# Patient Record
Sex: MALE | Race: WHITE | NOT HISPANIC OR LATINO | Employment: UNEMPLOYED | URBAN - METROPOLITAN AREA
[De-identification: names, ages, dates, MRNs, and addresses within clinical notes are randomized per-mention and may not be internally consistent; named-entity substitution may affect disease eponyms.]

---

## 2017-01-10 ENCOUNTER — GENERIC CONVERSION - ENCOUNTER (OUTPATIENT)
Dept: OTHER | Facility: OTHER | Age: 52
End: 2017-01-10

## 2017-07-07 ENCOUNTER — LAB CONVERSION - ENCOUNTER (OUTPATIENT)
Dept: OTHER | Facility: OTHER | Age: 52
End: 2017-07-07

## 2017-07-07 LAB
25(OH)D3 SERPL-MCNC: 36 NG/ML (ref 30–100)
A/G RATIO (HISTORICAL): 1.7 (CALC) (ref 1–2.5)
ALBUMIN SERPL BCP-MCNC: 4.6 G/DL (ref 3.6–5.1)
ALP SERPL-CCNC: 71 U/L (ref 40–115)
ALT SERPL W P-5'-P-CCNC: 14 U/L (ref 9–46)
AST SERPL W P-5'-P-CCNC: 23 U/L (ref 10–35)
BASOPHILS # BLD AUTO: 0.3 %
BASOPHILS # BLD AUTO: 16 CELLS/UL (ref 0–200)
BILIRUB SERPL-MCNC: 1.2 MG/DL (ref 0.2–1.2)
BUN SERPL-MCNC: 12 MG/DL (ref 7–25)
BUN/CREA RATIO (HISTORICAL): ABNORMAL (CALC) (ref 6–22)
CALCIUM (ADJUSTED FOR ALBUMIN) (HISTORICAL): 10 MG/DL (CALC) (ref 8.6–10.2)
CALCIUM SERPL-MCNC: 10.1 MG/DL (ref 8.6–10.3)
CHLORIDE SERPL-SCNC: 101 MMOL/L (ref 98–110)
CHOLEST SERPL-MCNC: 263 MG/DL (ref 125–200)
CHOLEST/HDLC SERPL: 2.9 (CALC)
CO2 SERPL-SCNC: 30 MMOL/L (ref 20–31)
CREAT SERPL-MCNC: 0.86 MG/DL (ref 0.7–1.33)
DEPRECATED RDW RBC AUTO: 13.2 % (ref 11–15)
EGFR AFRICAN AMERICAN (HISTORICAL): 114 ML/MIN/1.73M2
EGFR-AMERICAN CALC (HISTORICAL): 98 ML/MIN/1.73M2
EOSINOPHIL # BLD AUTO: 1.3 %
EOSINOPHIL # BLD AUTO: 68 CELLS/UL (ref 15–500)
GAMMA GLOBULIN (HISTORICAL): 2.7 G/DL (CALC) (ref 1.9–3.7)
GLUCOSE (HISTORICAL): 115 MG/DL (ref 65–99)
HCT VFR BLD AUTO: 43.5 % (ref 38.5–50)
HDLC SERPL-MCNC: 91 MG/DL
HGB BLD-MCNC: 14.8 G/DL (ref 13.2–17.1)
HIV AG/AB, 4TH GEN (HISTORICAL): NORMAL
LDL CHOLESTEROL (HISTORICAL): 144 MG/DL (CALC)
LYMPHOCYTES # BLD AUTO: 1539 CELLS/UL (ref 850–3900)
LYMPHOCYTES # BLD AUTO: 29.6 %
MCH RBC QN AUTO: 33.2 PG (ref 27–33)
MCHC RBC AUTO-ENTMCNC: 34 G/DL (ref 32–36)
MCV RBC AUTO: 97.5 FL (ref 80–100)
MONOCYTES # BLD AUTO: 432 CELLS/UL (ref 200–950)
MONOCYTES (HISTORICAL): 8.3 %
NEUTROPHILS # BLD AUTO: 3146 CELLS/UL (ref 1500–7800)
NEUTROPHILS # BLD AUTO: 60.5 %
NON-HDL-CHOL (CHOL-HDL) (HISTORICAL): 172 MG/DL (CALC)
PLATELET # BLD AUTO: 225 THOUSAND/UL (ref 140–400)
PMV BLD AUTO: 8.9 FL (ref 7.5–12.5)
POTASSIUM SERPL-SCNC: 4.8 MMOL/L (ref 3.5–5.3)
RBC # BLD AUTO: 4.46 MILLION/UL (ref 4.2–5.8)
SODIUM SERPL-SCNC: 141 MMOL/L (ref 135–146)
T4 FREE SERPL-MCNC: 1.9 NG/DL (ref 0.8–1.8)
TOTAL PROTEIN (HISTORICAL): 7.3 G/DL (ref 6.1–8.1)
TRIGL SERPL-MCNC: 138 MG/DL
TSH SERPL DL<=0.05 MIU/L-ACNC: 0.15 MIU/L (ref 0.4–4.5)
WBC # BLD AUTO: 5.2 THOUSAND/UL (ref 3.8–10.8)

## 2017-07-10 ENCOUNTER — ALLSCRIPTS OFFICE VISIT (OUTPATIENT)
Dept: OTHER | Facility: OTHER | Age: 52
End: 2017-07-10

## 2018-01-11 NOTE — MISCELLANEOUS
Message  Pt accompanied His partner today for his medical visit  After evaluating his Partner, patient wanted to voice complaints as well without appointment  He stated that he does travel frequently to [facility] plane  He was having increasing anxiety/panic attacks while on his flights  This patient's complaints were evaluated  At this time, he was given a prescription for alprazolam to take PRN before his flight  Address this complain much further in depth at his next check up  Plan  Cervical radiculopathy    · OxyCODONE HCl - 10 MG Oral Tablet; Take 1 tablet every 6 hours as needed for  pain  Fear of flying    · ALPRAZolam 0 5 MG Oral Tablet; Take 1 tablet 30 min prior to flight as needed    Signatures   Electronically signed by :  Shasta Wells DO; Sep 26 2016 10:17PM EST                       (Author)

## 2018-01-12 VITALS
HEIGHT: 70 IN | SYSTOLIC BLOOD PRESSURE: 140 MMHG | TEMPERATURE: 97.7 F | WEIGHT: 200.31 LBS | HEART RATE: 68 BPM | OXYGEN SATURATION: 98 % | BODY MASS INDEX: 28.68 KG/M2 | DIASTOLIC BLOOD PRESSURE: 84 MMHG

## 2018-03-13 DIAGNOSIS — M54.12 CERVICAL RADICULOPATHY: ICD-10-CM

## 2018-03-13 DIAGNOSIS — F40.243 FEAR OF FLYING: Primary | ICD-10-CM

## 2018-03-13 RX ORDER — OXYCODONE HYDROCHLORIDE 10 MG/1
1 TABLET ORAL EVERY 8 HOURS PRN
COMMUNITY
Start: 2015-09-28 | End: 2018-03-13 | Stop reason: SDUPTHER

## 2018-03-13 RX ORDER — ALPRAZOLAM 0.5 MG/1
0.5 TABLET ORAL
COMMUNITY
Start: 2016-09-26 | End: 2018-03-13 | Stop reason: SDUPTHER

## 2018-03-14 PROBLEM — R94.6 ABNORMAL THYROID FUNCTION TEST: Status: ACTIVE | Noted: 2017-07-10

## 2018-03-14 RX ORDER — ALPRAZOLAM 0.5 MG/1
0.5 TABLET ORAL DAILY PRN
Qty: 30 TABLET | Refills: 0 | Status: SHIPPED | OUTPATIENT
Start: 2018-03-14 | End: 2018-03-20 | Stop reason: SDUPTHER

## 2018-03-14 RX ORDER — OXYCODONE HYDROCHLORIDE 10 MG/1
10 TABLET ORAL EVERY 8 HOURS PRN
Qty: 60 TABLET | Refills: 0 | Status: SHIPPED | OUTPATIENT
Start: 2018-03-14 | End: 2018-03-20 | Stop reason: SDUPTHER

## 2018-03-20 DIAGNOSIS — E03.9 HYPOTHYROIDISM, UNSPECIFIED TYPE: Primary | ICD-10-CM

## 2018-03-20 DIAGNOSIS — F40.243 FEAR OF FLYING: ICD-10-CM

## 2018-03-20 DIAGNOSIS — M54.12 CERVICAL RADICULOPATHY: ICD-10-CM

## 2018-03-20 RX ORDER — LEVOTHYROXINE SODIUM 0.15 MG/1
1 TABLET ORAL DAILY
COMMUNITY
Start: 2011-07-19 | End: 2018-03-20 | Stop reason: SDUPTHER

## 2018-03-21 RX ORDER — ALPRAZOLAM 0.5 MG/1
0.5 TABLET ORAL DAILY PRN
Qty: 30 TABLET | Refills: 0 | Status: SHIPPED | OUTPATIENT
Start: 2018-03-21 | End: 2018-03-22 | Stop reason: SDUPTHER

## 2018-03-21 RX ORDER — OXYCODONE HYDROCHLORIDE 10 MG/1
10 TABLET ORAL EVERY 8 HOURS PRN
Qty: 60 TABLET | Refills: 0 | Status: SHIPPED | OUTPATIENT
Start: 2018-03-21 | End: 2018-03-22 | Stop reason: SDUPTHER

## 2018-03-21 RX ORDER — LEVOTHYROXINE SODIUM 0.15 MG/1
150 TABLET ORAL DAILY
Qty: 90 TABLET | Refills: 0 | Status: SHIPPED | OUTPATIENT
Start: 2018-03-21 | End: 2018-03-22 | Stop reason: SDUPTHER

## 2018-03-22 DIAGNOSIS — M54.12 CERVICAL RADICULOPATHY: ICD-10-CM

## 2018-03-22 DIAGNOSIS — F40.243 FEAR OF FLYING: ICD-10-CM

## 2018-03-22 DIAGNOSIS — E03.9 HYPOTHYROIDISM, UNSPECIFIED TYPE: ICD-10-CM

## 2018-03-22 RX ORDER — LEVOTHYROXINE SODIUM 0.15 MG/1
150 TABLET ORAL DAILY
Qty: 90 TABLET | Refills: 1 | Status: SHIPPED | OUTPATIENT
Start: 2018-03-22 | End: 2018-06-26 | Stop reason: SDUPTHER

## 2018-03-22 RX ORDER — OXYCODONE HYDROCHLORIDE 10 MG/1
10 TABLET ORAL EVERY 8 HOURS PRN
Qty: 60 TABLET | Refills: 0 | Status: SHIPPED | OUTPATIENT
Start: 2018-03-22 | End: 2018-05-07 | Stop reason: SDUPTHER

## 2018-03-22 RX ORDER — ALPRAZOLAM 0.5 MG/1
0.5 TABLET ORAL DAILY PRN
Qty: 30 TABLET | Refills: 0 | Status: SHIPPED | OUTPATIENT
Start: 2018-03-22 | End: 2018-05-07 | Stop reason: SDUPTHER

## 2018-03-29 DIAGNOSIS — Z13.0 SCREENING FOR IRON DEFICIENCY ANEMIA: ICD-10-CM

## 2018-03-29 DIAGNOSIS — Z72.51 HIGH RISK SEXUAL BEHAVIOR: ICD-10-CM

## 2018-03-29 DIAGNOSIS — E55.9 VITAMIN D DEFICIENCY: ICD-10-CM

## 2018-03-29 DIAGNOSIS — E78.2 MIXED HYPERLIPIDEMIA: ICD-10-CM

## 2018-03-29 DIAGNOSIS — Z13.1 SCREENING FOR DIABETES MELLITUS: ICD-10-CM

## 2018-03-29 DIAGNOSIS — E03.9 HYPOTHYROIDISM, UNSPECIFIED TYPE: Primary | ICD-10-CM

## 2018-04-01 ENCOUNTER — APPOINTMENT (EMERGENCY)
Dept: CT IMAGING | Facility: HOSPITAL | Age: 53
End: 2018-04-01
Payer: COMMERCIAL

## 2018-04-01 ENCOUNTER — HOSPITAL ENCOUNTER (EMERGENCY)
Facility: HOSPITAL | Age: 53
Discharge: HOME | End: 2018-04-01
Attending: EMERGENCY MEDICINE | Admitting: EMERGENCY MEDICINE
Payer: COMMERCIAL

## 2018-04-01 VITALS
RESPIRATION RATE: 18 BRPM | SYSTOLIC BLOOD PRESSURE: 154 MMHG | TEMPERATURE: 98.3 F | BODY MASS INDEX: 28.63 KG/M2 | HEART RATE: 66 BPM | WEIGHT: 200 LBS | HEIGHT: 70 IN | DIASTOLIC BLOOD PRESSURE: 81 MMHG | OXYGEN SATURATION: 100 %

## 2018-04-01 DIAGNOSIS — K29.70 GASTRITIS: Primary | ICD-10-CM

## 2018-04-01 DIAGNOSIS — R11.10 VOMITING: ICD-10-CM

## 2018-04-01 DIAGNOSIS — R10.13 EPIGASTRIC PAIN: ICD-10-CM

## 2018-04-01 LAB
ALBUMIN SERPL BCP-MCNC: 4.8 G/DL (ref 3.5–5)
ALP SERPL-CCNC: 75 U/L (ref 46–116)
ALT SERPL W P-5'-P-CCNC: 31 U/L (ref 12–78)
ANION GAP SERPL CALCULATED.3IONS-SCNC: 17 MMOL/L (ref 4–13)
AST SERPL W P-5'-P-CCNC: 28 U/L (ref 5–45)
BASOPHILS # BLD MANUAL: 0 THOUSAND/UL (ref 0–0.1)
BASOPHILS NFR MAR MANUAL: 0 % (ref 0–1)
BILIRUB SERPL-MCNC: 1.4 MG/DL (ref 0.2–1)
BUN SERPL-MCNC: 13 MG/DL (ref 5–25)
CALCIUM SERPL-MCNC: 10.6 MG/DL (ref 8.3–10.1)
CHLORIDE SERPL-SCNC: 100 MMOL/L (ref 100–108)
CO2 SERPL-SCNC: 23 MMOL/L (ref 21–32)
CREAT SERPL-MCNC: 0.89 MG/DL (ref 0.6–1.3)
EOSINOPHIL # BLD MANUAL: 0 THOUSAND/UL (ref 0–0.4)
EOSINOPHIL NFR BLD MANUAL: 0 % (ref 0–6)
ERYTHROCYTE [DISTWIDTH] IN BLOOD BY AUTOMATED COUNT: 12.2 % (ref 11.6–15.1)
GFR SERPL CREATININE-BSD FRML MDRD: 97 ML/MIN/1.73SQ M
GLUCOSE SERPL-MCNC: 174 MG/DL (ref 65–140)
HCT VFR BLD AUTO: 42.8 % (ref 36.5–49.3)
HGB BLD-MCNC: 15.6 G/DL (ref 12–17)
LIPASE SERPL-CCNC: 90 U/L (ref 73–393)
LYMPHOCYTES # BLD AUTO: 0.43 THOUSAND/UL (ref 0.6–4.47)
LYMPHOCYTES # BLD AUTO: 7 % (ref 14–44)
MCH RBC QN AUTO: 34 PG (ref 26.8–34.3)
MCHC RBC AUTO-ENTMCNC: 36.4 G/DL (ref 31.4–37.4)
MCV RBC AUTO: 93 FL (ref 82–98)
MONOCYTES # BLD AUTO: 0.19 THOUSAND/UL (ref 0–1.22)
MONOCYTES NFR BLD: 3 % (ref 4–12)
NEUTROPHILS # BLD MANUAL: 5.56 THOUSAND/UL (ref 1.85–7.62)
NEUTS BAND NFR BLD MANUAL: 18 % (ref 0–8)
NEUTS SEG NFR BLD AUTO: 72 % (ref 43–75)
NRBC BLD AUTO-RTO: 0 /100 WBCS
PLATELET # BLD AUTO: 213 THOUSANDS/UL (ref 149–390)
PLATELET BLD QL SMEAR: ADEQUATE
PMV BLD AUTO: 11.3 FL (ref 8.9–12.7)
POTASSIUM SERPL-SCNC: 3.4 MMOL/L (ref 3.5–5.3)
PROT SERPL-MCNC: 8.6 G/DL (ref 6.4–8.2)
RBC # BLD AUTO: 4.59 MILLION/UL (ref 3.88–5.62)
RBC MORPH BLD: NORMAL
SODIUM SERPL-SCNC: 140 MMOL/L (ref 136–145)
TOTAL CELLS COUNTED SPEC: 100
WBC # BLD AUTO: 6.18 THOUSAND/UL (ref 4.31–10.16)

## 2018-04-01 PROCEDURE — 36415 COLL VENOUS BLD VENIPUNCTURE: CPT | Performed by: EMERGENCY MEDICINE

## 2018-04-01 PROCEDURE — 93005 ELECTROCARDIOGRAM TRACING: CPT

## 2018-04-01 PROCEDURE — C9113 INJ PANTOPRAZOLE SODIUM, VIA: HCPCS | Performed by: EMERGENCY MEDICINE

## 2018-04-01 PROCEDURE — 85027 COMPLETE CBC AUTOMATED: CPT | Performed by: EMERGENCY MEDICINE

## 2018-04-01 PROCEDURE — 80053 COMPREHEN METABOLIC PANEL: CPT | Performed by: EMERGENCY MEDICINE

## 2018-04-01 PROCEDURE — 74177 CT ABD & PELVIS W/CONTRAST: CPT

## 2018-04-01 PROCEDURE — 96374 THER/PROPH/DIAG INJ IV PUSH: CPT

## 2018-04-01 PROCEDURE — 96375 TX/PRO/DX INJ NEW DRUG ADDON: CPT

## 2018-04-01 PROCEDURE — 96361 HYDRATE IV INFUSION ADD-ON: CPT

## 2018-04-01 PROCEDURE — 85007 BL SMEAR W/DIFF WBC COUNT: CPT | Performed by: EMERGENCY MEDICINE

## 2018-04-01 PROCEDURE — 83690 ASSAY OF LIPASE: CPT | Performed by: EMERGENCY MEDICINE

## 2018-04-01 PROCEDURE — 99284 EMERGENCY DEPT VISIT MOD MDM: CPT

## 2018-04-01 RX ORDER — DICYCLOMINE HCL 20 MG
20 TABLET ORAL 2 TIMES DAILY
Qty: 10 TABLET | Refills: 0 | Status: SHIPPED | OUTPATIENT
Start: 2018-04-01 | End: 2018-06-03

## 2018-04-01 RX ORDER — PANTOPRAZOLE SODIUM 40 MG/1
40 INJECTION, POWDER, FOR SOLUTION INTRAVENOUS ONCE
Status: COMPLETED | OUTPATIENT
Start: 2018-04-01 | End: 2018-04-01

## 2018-04-01 RX ORDER — METOCLOPRAMIDE HYDROCHLORIDE 5 MG/ML
10 INJECTION INTRAMUSCULAR; INTRAVENOUS ONCE
Status: COMPLETED | OUTPATIENT
Start: 2018-04-01 | End: 2018-04-01

## 2018-04-01 RX ORDER — ONDANSETRON 2 MG/ML
4 INJECTION INTRAMUSCULAR; INTRAVENOUS ONCE
Status: COMPLETED | OUTPATIENT
Start: 2018-04-01 | End: 2018-04-01

## 2018-04-01 RX ORDER — PANTOPRAZOLE SODIUM 40 MG/1
40 TABLET, DELAYED RELEASE ORAL DAILY
Qty: 15 TABLET | Refills: 0 | Status: SHIPPED | OUTPATIENT
Start: 2018-04-01 | End: 2018-06-03

## 2018-04-01 RX ORDER — ONDANSETRON 4 MG/1
4 TABLET, ORALLY DISINTEGRATING ORAL EVERY 8 HOURS PRN
Qty: 15 TABLET | Refills: 0 | Status: SHIPPED | OUTPATIENT
Start: 2018-04-01 | End: 2018-06-03

## 2018-04-01 RX ORDER — MORPHINE SULFATE 4 MG/ML
4 INJECTION, SOLUTION INTRAMUSCULAR; INTRAVENOUS ONCE
Status: COMPLETED | OUTPATIENT
Start: 2018-04-01 | End: 2018-04-01

## 2018-04-01 RX ORDER — MAGNESIUM HYDROXIDE/ALUMINUM HYDROXICE/SIMETHICONE 120; 1200; 1200 MG/30ML; MG/30ML; MG/30ML
30 SUSPENSION ORAL ONCE
Status: COMPLETED | OUTPATIENT
Start: 2018-04-01 | End: 2018-04-01

## 2018-04-01 RX ORDER — FENTANYL CITRATE 50 UG/ML
50 INJECTION, SOLUTION INTRAMUSCULAR; INTRAVENOUS ONCE
Status: DISCONTINUED | OUTPATIENT
Start: 2018-04-01 | End: 2018-04-02 | Stop reason: HOSPADM

## 2018-04-01 RX ADMIN — ALUMINUM HYDROXIDE, MAGNESIUM HYDROXIDE, AND SIMETHICONE 30 ML: 200; 200; 20 SUSPENSION ORAL at 19:11

## 2018-04-01 RX ADMIN — SODIUM CHLORIDE 1000 ML: 0.9 INJECTION, SOLUTION INTRAVENOUS at 18:45

## 2018-04-01 RX ADMIN — LIDOCAINE HYDROCHLORIDE 15 ML: 20 SOLUTION ORAL; TOPICAL at 19:12

## 2018-04-01 RX ADMIN — MORPHINE SULFATE 4 MG: 4 INJECTION INTRAVENOUS at 18:41

## 2018-04-01 RX ADMIN — IOHEXOL 100 ML: 350 INJECTION, SOLUTION INTRAVENOUS at 20:58

## 2018-04-01 RX ADMIN — ONDANSETRON 4 MG: 2 INJECTION INTRAMUSCULAR; INTRAVENOUS at 18:41

## 2018-04-01 RX ADMIN — PANTOPRAZOLE SODIUM 40 MG: 40 INJECTION, POWDER, FOR SOLUTION INTRAVENOUS at 18:44

## 2018-04-01 RX ADMIN — METOCLOPRAMIDE 10 MG: 5 INJECTION, SOLUTION INTRAMUSCULAR; INTRAVENOUS at 20:29

## 2018-04-01 NOTE — ED PROVIDER NOTES
History  Chief Complaint   Patient presents with    Vomiting     vomiting since 10am, "like 25 times" abdominal pain  denies diarrhea and fever  History provided by:  Patient   used: No    Vomiting   Severity:  Moderate  Duration:  12 hours  Timing:  Intermittent  Number of daily episodes:  Unable to specify  Quality:  Stomach contents  Able to tolerate:  Liquids  Progression:  Unchanged  Chronicity:  New  Recent urination:  Normal  Relieved by:  Nothing  Worsened by:  Nothing  Ineffective treatments:  None tried  Associated symptoms: abdominal pain    Associated symptoms: no chills, no cough, no diarrhea, no fever, no headaches and no sore throat    Abdominal pain:     Location:  Epigastric    Quality: cramping      Severity:  Moderate    Onset quality:  Gradual    Duration:  12 hours    Timing:  Intermittent    Progression:  Waxing and waning    Chronicity:  New  Risk factors: no prior abdominal surgery        Prior to Admission Medications   Prescriptions Last Dose Informant Patient Reported? Taking? ALPRAZolam (XANAX) 0 5 mg tablet   No No   Sig: Take 1 tablet (0 5 mg total) by mouth daily as needed for anxiety   levothyroxine 150 mcg tablet   No No   Sig: Take 1 tablet (150 mcg total) by mouth daily   oxyCODONE (ROXICODONE) 10 MG TABS   No No   Sig: Take 1 tablet (10 mg total) by mouth every 8 (eight) hours as needed for severe pain Earliest Fill Date: 3/22/18 Max Daily Amount: 30 mg      Facility-Administered Medications: None       Past Medical History:   Diagnosis Date    Abnormal glucose     last assessed-12/5/2013    Acute infective pharyngitis     last assessed-5/19/2013    Elevated blood pressure reading without diagnosis of hypertension     last assessed-12/5/2013    Enlarged prostate     last assessed-12/2/2013    Exposure to STD     last assessed-3/30/2016    Injury of blood vessel at forearm level     last assessed-3/30/2013       History reviewed   No pertinent surgical history  Family History   Problem Relation Age of Onset    No Known Problems Mother     No Known Problems Father      I have reviewed and agree with the history as documented  Social History   Substance Use Topics    Smoking status: Never Smoker    Smokeless tobacco: Never Used    Alcohol use No        Review of Systems   Constitutional: Negative for activity change, chills and fever  HENT: Negative for facial swelling, sore throat and trouble swallowing  Eyes: Negative for pain and visual disturbance  Respiratory: Negative for cough, chest tightness and shortness of breath  Cardiovascular: Negative for chest pain and leg swelling  Gastrointestinal: Positive for abdominal pain and vomiting  Negative for blood in stool, diarrhea and nausea  Genitourinary: Negative for dysuria and flank pain  Musculoskeletal: Negative for back pain, neck pain and neck stiffness  Skin: Negative for pallor and rash  Allergic/Immunologic: Negative for environmental allergies and immunocompromised state  Neurological: Negative for dizziness and headaches  Hematological: Negative for adenopathy  Does not bruise/bleed easily  Psychiatric/Behavioral: Negative for agitation and behavioral problems  All other systems reviewed and are negative        Physical Exam  ED Triage Vitals   Temperature Pulse Respirations Blood Pressure SpO2   04/01/18 1804 04/01/18 1804 04/01/18 1804 04/01/18 1805 04/01/18 1804   98 3 °F (36 8 °C) 66 18 (!) 202/95 99 %      Temp Source Heart Rate Source Patient Position - Orthostatic VS BP Location FiO2 (%)   04/01/18 1804 04/01/18 1804 04/01/18 1805 04/01/18 1805 --   Oral Monitor Sitting Right arm       Pain Score       04/01/18 1804       6           Orthostatic Vital Signs  Vitals:    04/01/18 1804 04/01/18 1805 04/01/18 2000 04/01/18 2145   BP:  (!) 202/95 (!) 195/100 154/81   Pulse: 66  62 66   Patient Position - Orthostatic VS:  Sitting Sitting Sitting Physical Exam   Constitutional: He is oriented to person, place, and time  He appears well-developed and well-nourished  No distress  HENT:   Head: Normocephalic and atraumatic  Eyes: EOM are normal    Neck: Normal range of motion  Neck supple  Cardiovascular: Normal rate, regular rhythm, normal heart sounds and intact distal pulses  Pulmonary/Chest: Effort normal and breath sounds normal    Abdominal: Soft  Bowel sounds are normal  There is tenderness (epigastric)  There is no rebound and no guarding  Musculoskeletal: Normal range of motion  Neurological: He is alert and oriented to person, place, and time  Skin: Skin is warm and dry  Psychiatric: He has a normal mood and affect  Nursing note and vitals reviewed        ED Medications  Medications   fentanyl citrate (PF) 100 MCG/2ML 50 mcg (0 mcg Intravenous Hold 4/1/18 2045)   sodium chloride 0 9 % bolus 1,000 mL (0 mL Intravenous Stopped 4/1/18 2001)   ondansetron (ZOFRAN) injection 4 mg (4 mg Intravenous Given 4/1/18 1841)   pantoprazole (PROTONIX) injection 40 mg (40 mg Intravenous Given 4/1/18 1844)   morphine (PF) 4 mg/mL injection 4 mg (4 mg Intravenous Given 4/1/18 1841)   aluminum-magnesium hydroxide-simethicone (MYLANTA) 200-200-20 mg/5 mL oral suspension 30 mL (30 mL Oral Given 4/1/18 1911)   lidocaine viscous (XYLOCAINE) 2 % mucosal solution 15 mL (15 mL Swish & Swallow Given 4/1/18 1912)   metoclopramide (REGLAN) injection 10 mg (10 mg Intravenous Given 4/1/18 2029)   iohexol (OMNIPAQUE) 350 MG/ML injection (MULTI-DOSE) 100 mL (100 mL Intravenous Given 4/1/18 2058)       Diagnostic Studies  Results Reviewed     Procedure Component Value Units Date/Time    CBC and differential [[id number]]  (Normal) Collected:  04/01/18 1845    Lab Status:  Final result Specimen:  Blood from Arm, Right Updated:  04/01/18 1922     WBC 6 18 Thousand/uL      RBC 4 59 Million/uL      Hemoglobin 15 6 g/dL      Hematocrit 42 8 %      MCV 93 fL      MCH 34 0 pg      MCHC 36 4 g/dL      RDW 12 2 %      MPV 11 3 fL      Platelets 975 Thousands/uL      nRBC 0 /100 WBCs     Narrative: This is an appended report  These results have been appended to a previously verified report  Comprehensive metabolic panel [[id number]]  (Abnormal) Collected:  04/01/18 1845    Lab Status:  Final result Specimen:  Blood from Arm, Right Updated:  04/01/18 1913     Sodium 140 mmol/L      Potassium 3 4 (L) mmol/L      Chloride 100 mmol/L      CO2 23 mmol/L      Anion Gap 17 (H) mmol/L      BUN 13 mg/dL      Creatinine 0 89 mg/dL      Glucose 174 (H) mg/dL      Calcium 10 6 (H) mg/dL      AST 28 U/L      ALT 31 U/L      Alkaline Phosphatase 75 U/L      Total Protein 8 6 (H) g/dL      Albumin 4 8 g/dL      Total Bilirubin 1 40 (H) mg/dL      eGFR 97 ml/min/1 73sq m     Narrative:         National Kidney Disease Education Program recommendations are as follows:  GFR calculation is accurate only with a steady state creatinine  Chronic Kidney disease less than 60 ml/min/1 73 sq  meters  Kidney failure less than 15 ml/min/1 73 sq  meters  Lipase [[id number]]  (Normal) Collected:  04/01/18 1845    Lab Status:  Final result Specimen:  Blood from Arm, Right Updated:  04/01/18 1913     Lipase 90 u/L                  CT abdomen pelvis with contrast   Final Result by Geni Lyon MD (04/01 2226)   No acute inflammatory process in the abdomen or pelvis              Workstation performed: [id number]                    Procedures  ECG 12 Lead Documentation  Date/Time: 4/1/2018 8:26 PM  Performed by: Yvonne Packer  Authorized by: Yvonne Packer     Indications / Diagnosis:  Epigastric pain  ECG reviewed by me, the ED Provider: yes    Patient location:  ED  Interpretation:     Interpretation: normal    Rate:     ECG rate:  58    ECG rate assessment: bradycardic    Rhythm:     Rhythm: sinus bradycardia    Ectopy:     Ectopy: none    QRS:     QRS axis:  Normal  Conduction:     Conduction: normal ST segments:     ST segments:  Normal  T waves:     T waves: normal             Phone Contacts  ED Phone Contact    ED Course  ED Course as of Apr 01 2302   Sun Apr 01, 2018 2022 Labs reviewed, no acute abnormality  Patient re-evaluated at bedside, feels better but still nauseous, we will give Reglan, will do p o  Trial after  2150 Vitals improved, feels better, pain is less  CT results pending  Blood Pressure: 154/81   2230 CT A/P no acute abnormality  We will discharge as Gastritis, prescribe Protonix, Zofran, Bentyl; follow up with PCP  NOTE: CT was done as patient continued to be nauseas with hypertension and persisting epigastric pain (no chest pain), after zofran, ivf, protonix; required reglan  MDM  Number of Diagnoses or Management Options  Epigastric pain: new and requires workup  Gastritis: new and requires workup  Vomiting: new and requires workup  Diagnosis management comments: Patient is a 70-year-old male, comes in with complaints of epigastric pain, nausea, vomiting, started around 9-10 a m  in the morning, did not have dinner last night, had little breakfast but felt nauseous, denies fever, diarrhea, chest pain or dyspnea; denies sick contacts or recent travel  On exam vital signs noted for hypertension, bilateral equal pulses, abdomen soft, tenderness noted in epigastrium; lungs clear, cardiovascular normal heart sounds  D/D:  Gastritis, viral GI illness, cholecystitis, biliary colic  Will check labs including CBC, CMP, lipase, give IV fluids, Zofran, Protonix on pain meds         Amount and/or Complexity of Data Reviewed  Clinical lab tests: reviewed and ordered  Tests in the radiology section of CPT®: ordered and reviewed  Tests in the medicine section of CPT®: ordered and reviewed  Independent visualization of images, tracings, or specimens: yes      CritCare Time    Disposition  Final diagnoses:   Gastritis   Epigastric pain   Vomiting     Time reflects when diagnosis was documented in both MDM as applicable and the Disposition within this note     Time User Action Codes Description Comment    4/1/2018  9:54 PM Lowella Leader Add [K29 70] Gastritis     4/1/2018  9:54 PM Lowella Leader Add [R10 13] Epigastric pain     4/1/2018  9:55 PM Lowella Leader Add [R11 10] Vomiting       ED Disposition     ED Disposition Condition Comment    Discharge  [address] discharge to home/self care  Condition at discharge: Stable        Follow-up Information     Follow up With Specialties Details Why [address],  Family Medicine, Obstetrics, Obstetrics and Gynecology   [address] [facility] [address] [address] [facility] [address]  [phone]          Discharge Medication List as of 4/1/2018 10:46 PM      START taking these medications    Details   dicyclomine (BENTYL) 20 mg tablet Take 1 tablet (20 mg total) by mouth 2 (two) times a day for 5 days, Starting Sun 4/1/2018, Until Fri 4/6/2018, Print      ondansetron (ZOFRAN-ODT) 4 mg disintegrating tablet Take 1 tablet (4 mg total) by mouth every 8 (eight) hours as needed for vomiting for up to 5 days, Starting Sun 4/1/2018, Until Fri 4/6/2018, Print      pantoprazole (PROTONIX) 40 mg tablet Take 1 tablet (40 mg total) by mouth daily, Starting Sun 4/1/2018, Print         CONTINUE these medications which have NOT CHANGED    Details   ALPRAZolam (XANAX) 0 5 mg tablet Take 1 tablet (0 5 mg total) by mouth daily as needed for anxiety, Starting Thu 3/22/2018, Print      levothyroxine 150 mcg tablet Take 1 tablet (150 mcg total) by mouth daily, Starting u 3/22/2018, Print      oxyCODONE (ROXICODONE) 10 MG TABS Take 1 tablet (10 mg total) by mouth every 8 (eight) hours as needed for severe pain Earliest Fill Date: 3/22/18 Max Daily Amount: 30 mg, Starting u 3/22/2018, Print           No discharge procedures on file      ED Provider  Electronically Signed by           Peyton Geiger MD  04/01/18 0803

## 2018-04-02 LAB
ATRIAL RATE: 58 BPM
P AXIS: 95 DEGREES
PR INTERVAL: 122 MS
QRS AXIS: 67 DEGREES
QRSD INTERVAL: 76 MS
QT INTERVAL: 438 MS
QTC INTERVAL: 429 MS
T WAVE AXIS: 40 DEGREES
VENTRICULAR RATE: 58 BPM

## 2018-04-02 PROCEDURE — 93010 ELECTROCARDIOGRAM REPORT: CPT | Performed by: INTERNAL MEDICINE

## 2018-04-02 NOTE — DISCHARGE INSTRUCTIONS
Acute Nausea and Vomiting, Ambulatory Care   GENERAL INFORMATION:   Acute nausea and vomiting  starts suddenly, gets worse quickly, and lasts a short time  Nausea and vomiting may be caused by pregnancy, alcohol, infection, or medicines  Common related symptoms include the following:   · Fever    · Abdominal swelling    · Pain, tenderness, or a lump in the abdomen    · Splashing sounds heard in your stomach when you move  Seek immediate care for the following symptoms:   · Blood in your vomit or bowel movements    · Sudden, severe pain in your chest and upper abdomen after hard vomiting    · Dizziness, dry mouth, and thirst    · Urinating very little or not at all    · Muscle weakness, leg cramps, and trouble breathing    · A heart beat that is faster than normal    · Vomiting for more than 48 hours  Treatment for acute nausea and vomiting  may include medicines to calm your stomach and stop the vomiting  You may need IV fluids if you are dehydrated  Manage your nausea and vomiting:   · Drink liquids as directed to prevent dehydration  Ask how much liquid to drink each day and which liquids are best for you  You may need to drink an oral rehydration solution (ORS)  ORS contains water, salts, and sugar that are needed to replace the lost body fluids  Ask what kind of ORS to use, how much to drink, and where to get it  · Eat smaller meals, more often  Eat small amounts of food every 2 to 3 hours, even if you are not hungry  Food in your stomach may help decrease your nausea  · Avoid stress  Find ways to relax and manage your stress  Headaches due to stress may cause nausea and vomiting  Get more rest and sleep  Follow up with your healthcare provider as directed:  Write down your questions so you remember to ask them during your visits  CARE AGREEMENT:   You have the right to help plan your care  Learn about your health condition and how it may be treated   Discuss treatment options with your caregivers to decide what care you want to receive  You always have the right to refuse treatment  The above information is an  only  It is not intended as medical advice for individual conditions or treatments  Talk to your doctor, nurse or pharmacist before following any medical regimen to see if it is safe and effective for you  © 2014 [address] is for End User's use only and may not be sold, redistributed or otherwise used for commercial purposes  All illustrations and images included in CareNotes® are the copyrighted property of [employer]  or [address]  Gastritis   WHAT YOU NEED TO KNOW:   What is gastritis? Gastritis is inflammation or irritation of the lining of your stomach  What increases my risk for gastritis? · Infection with bacteria, a virus, or a parasite    · NSAIDs, aspirin, or steroid medicine    · Use of tobacco products or alcohol    · Trauma such as an injury to your stomach or intestine    · Autoimmune disorders such as diabetes, thyroid disease, or Crohn disease    · Stress    · Age older than 60 years    · Illegal drugs, such as cocaine  What are the signs and symptoms of gastritis? · Stomach pain, burning, or tenderness when you press on it    · Stomach fullness or tightness    · Nausea or vomiting    · Loss of appetite, or feeling full quickly when you eat    · Bad breath    · Fatigue or feeling more tired than usual    · Heartburn  How is gastritis diagnosed? Your healthcare provider will ask about your signs and symptoms and examine you  You may need any of the following:  · Blood tests  may be used to show an infection, dehydration, or anemia (low red blood cell levels)  · A bowel movement sample  may be tested for blood or the germ that may be causing your gastritis  · A breath test  may show if H pylori is causing your gastritis  You will be given a liquid to drink  Then you will breathe into a bag   Your healthcare provider will measure the amount of carbon dioxide in your breath  Extra amounts of carbon dioxide may mean you have an H pylori infection  · An endoscopy  may be used to look for irritation or bleeding in your stomach  Your healthcare provider will use an endoscope (tube with a light and camera on the end) during the procedure  He or she may take a sample from your stomach to be tested  How is gastritis treated? Your symptoms may go away without treatment  Treatment will depend on what is causing your gastritis  Your healthcare provider may recommend changes to the medicines you take  Medicines may be given to help treat a bacterial infection or decrease stomach acid  How can I manage or prevent gastritis? · Do not smoke  Nicotine and other chemicals in cigarettes and cigars can make your symptoms worse and cause lung damage  Ask your healthcare provider for information if you currently smoke and need help to quit  E-cigarettes or smokeless tobacco still contain nicotine  Talk to your healthcare provider before you use these products  · Do not drink alcohol  Alcohol can prevent healing and make your gastritis worse  Talk to your healthcare provider if you need help to stop drinking  · Do not take NSAIDs or aspirin unless directed  These and similar medicines can cause irritation of your stomach lining  If your healthcare provider says it is okay to take NSAIDs, take them with food  · Do not eat foods that cause irritation  Foods such as oranges and salsa can cause burning or pain  Eat a variety of healthy foods  Examples include fruits (not citrus), vegetables, low-fat dairy products, beans, whole-grain breads, and lean meats and fish  Try to eat small meals, and drink water with your meals  Do not eat for at least 3 hours before you go to bed  · Find ways to relax and decrease stress  Stress can increase stomach acid and make gastritis worse   Activities such as yoga, meditation, or listening to music can help you relax  Spend time with friends, or do things you enjoy  Call 911 for any of the following:   · You develop chest pain or shortness of breath  When should I seek immediate care? · You vomit blood  · You have black or bloody bowel movements  · You have severe stomach or back pain  When should I contact my healthcare provider? · You have a fever  · You have new or worsening symptoms, even after treatment  · You have questions or concerns about your condition or care  CARE AGREEMENT:   You have the right to help plan your care  Learn about your health condition and how it may be treated  Discuss treatment options with your caregivers to decide what care you want to receive  You always have the right to refuse treatment  The above information is an  only  It is not intended as medical advice for individual conditions or treatments  Talk to your doctor, nurse or pharmacist before following any medical regimen to see if it is safe and effective for you  © 2017 [address] Information is for End User's use only and may not be sold, redistributed or otherwise used for commercial purposes  All illustrations and images included in CareNotes® are the copyrighted property of [employer]  or [employer] Candelaria

## 2018-04-02 NOTE — ED NOTES
Pt reported nausea has been "a little relieved" and is able to tolerate Mertha Adrianne, RN  04/01/18 2052

## 2018-04-16 ENCOUNTER — OFFICE VISIT (OUTPATIENT)
Dept: FAMILY MEDICINE CLINIC | Facility: CLINIC | Age: 53
End: 2018-04-16
Payer: COMMERCIAL

## 2018-04-16 VITALS
HEIGHT: 70 IN | SYSTOLIC BLOOD PRESSURE: 144 MMHG | TEMPERATURE: 97.9 F | WEIGHT: 201.7 LBS | OXYGEN SATURATION: 97 % | RESPIRATION RATE: 19 BRPM | DIASTOLIC BLOOD PRESSURE: 94 MMHG | HEART RATE: 102 BPM | BODY MASS INDEX: 28.88 KG/M2

## 2018-04-16 DIAGNOSIS — E78.2 MIXED HYPERLIPIDEMIA: ICD-10-CM

## 2018-04-16 DIAGNOSIS — E03.9 HYPOTHYROIDISM, UNSPECIFIED TYPE: ICD-10-CM

## 2018-04-16 DIAGNOSIS — M54.12 CERVICAL RADICULOPATHY: ICD-10-CM

## 2018-04-16 DIAGNOSIS — E55.9 VITAMIN D DEFICIENCY: ICD-10-CM

## 2018-04-16 DIAGNOSIS — Z12.11 SCREENING FOR COLON CANCER: Primary | ICD-10-CM

## 2018-04-16 PROCEDURE — 99214 OFFICE O/P EST MOD 30 MIN: CPT | Performed by: FAMILY MEDICINE

## 2018-04-16 NOTE — PROGRESS NOTES
Assessment/Plan:   1  Cervical radiculopathy  Symptoms appear stable today  Continue with current treatment of oxycodone  He denies at any sedating side effects  He has been through multiple treatments in the past all of which did not provide him with relief  2  Hypothyroidism, unspecified type  Unclear of control  Patient recently was in able to complete his blood work as he had acute gastroenteritis  At this time, recheck blood work including TSH, FLP as well as his HIV  Continue with current dose of levothyroxine  3  Mixed hyperlipidemia  Recheck fasting lipid panel  Continue with a strict low-fat/low-cholesterol diet  4  Vitamin D deficiency  Stable  Continue with daily supplementation  5  Screening for colon cancer  Patient was advised on the importance of having this screening test   He was educated on different methods for screening  Referral was given today for Gastroenterology  Follow-up in 6 months  - Ambulatory referral to Gastroenterology; Future     Diagnoses and all orders for this visit:    Screening for colon cancer  -     Ambulatory referral to Gastroenterology; Future    Hypothyroidism, unspecified type    Mixed hyperlipidemia    Vitamin D deficiency    Cervical radiculopathy          Subjective:    Chief Complaint   Patient presents with    Follow-up        Patient ID: Gabe Mckeon is a 45 y o  male  Patient is a 26-year-old male presents today for follow-up on his chronic conditions  He has cervical radiculopathy, hypothyroidism, hyperlipidemia, vitamin-D deficiency as well as anxiety disorder  He has been taking his medications regularly and tolerating them very well  He denies adverse reactions with his medications  He recently was in the ED secondary to acute gastroenteritis  He did have blood work completed including CBC and CMP which were stable  His symptoms quickly resolved shortly after his discharge          Review of Systems   Constitutional: Negative for activity change, chills, fatigue and fever  HENT: Negative for congestion, ear pain, sinus pressure and sore throat  Eyes: Negative for redness, itching and visual disturbance  Respiratory: Negative for cough and shortness of breath  Cardiovascular: Negative for chest pain and palpitations  Gastrointestinal: Negative for abdominal pain, diarrhea and nausea  Endocrine: Negative for cold intolerance and heat intolerance  Genitourinary: Negative for dysuria, flank pain and frequency  Musculoskeletal: Negative for arthralgias, back pain, gait problem and myalgias  Skin: Negative for color change  Allergic/Immunologic: Negative for environmental allergies  Neurological: Negative for dizziness, numbness and headaches  Psychiatric/Behavioral: Negative for behavioral problems and sleep disturbance  The following portions of the patient's history were reviewed and updated as appropriate : past family history, past medical history, past social history and past surgical history  Objective:    Vitals:    04/16/18 1124 04/16/18 1146   BP: 160/94 144/94   BP Location: Left arm    Patient Position: Sitting    Cuff Size: Adult    Pulse: 102    Resp: 19    Temp: 97 9 °F (36 6 °C)    TempSrc: Tympanic    SpO2: 97%    Weight: 91 5 kg (201 lb 11 2 oz)    Height: 5' 9 5" (1 765 m)         Physical Exam   Constitutional: He is oriented to person, place, and time  He appears well-developed and well-nourished  HENT:   Head: Normocephalic and atraumatic  Nose: Nose normal    Mouth/Throat: No oropharyngeal exudate  Eyes: Pupils are equal, round, and reactive to light  Right eye exhibits no discharge  Left eye exhibits no discharge  Neck: Normal range of motion  Neck supple  No tracheal deviation present  Cardiovascular: Normal rate, regular rhythm and intact distal pulses  Exam reveals no gallop and no friction rub  No murmur heard    Pulses:       Dorsalis pedis pulses are 2+ on the right side, and 2+ on the left side  Posterior tibial pulses are 2+ on the right side, and 2+ on the left side  Pulmonary/Chest: Effort normal and breath sounds normal  No respiratory distress  He has no wheezes  He has no rales  Abdominal: Soft  Bowel sounds are normal  He exhibits no distension  There is no tenderness  There is no rebound and no guarding  Musculoskeletal: Normal range of motion  He exhibits no edema  Lymphadenopathy:        Head (right side): No submental and no submandibular adenopathy present  Head (left side): No submental and no submandibular adenopathy present  He has no cervical adenopathy  Right cervical: No superficial cervical, no deep cervical and no posterior cervical adenopathy present  Left cervical: No superficial cervical, no deep cervical and no posterior cervical adenopathy present  Neurological: He is alert and oriented to person, place, and time  No cranial nerve deficit or sensory deficit  Skin: Skin is warm, dry and intact  Psychiatric: His speech is normal and behavior is normal  Judgment normal  His mood appears not anxious  Cognition and memory are normal  He does not exhibit a depressed mood  Vitals reviewed

## 2018-05-07 DIAGNOSIS — M54.12 CERVICAL RADICULOPATHY: ICD-10-CM

## 2018-05-07 DIAGNOSIS — F40.243 FEAR OF FLYING: ICD-10-CM

## 2018-05-07 RX ORDER — ALPRAZOLAM 0.5 MG/1
0.5 TABLET ORAL DAILY PRN
Qty: 30 TABLET | Refills: 0 | Status: SHIPPED | OUTPATIENT
Start: 2018-05-07 | End: 2018-05-08 | Stop reason: SDUPTHER

## 2018-05-07 RX ORDER — OXYCODONE HYDROCHLORIDE 10 MG/1
10 TABLET ORAL EVERY 8 HOURS PRN
Qty: 60 TABLET | Refills: 0 | Status: SHIPPED | OUTPATIENT
Start: 2018-05-07 | End: 2018-06-26 | Stop reason: SDUPTHER

## 2018-05-08 DIAGNOSIS — F40.243 FEAR OF FLYING: ICD-10-CM

## 2018-05-08 RX ORDER — ALPRAZOLAM 0.5 MG/1
0.5 TABLET ORAL DAILY PRN
Qty: 30 TABLET | Refills: 0 | Status: SHIPPED | OUTPATIENT
Start: 2018-05-08 | End: 2018-06-26 | Stop reason: SDUPTHER

## 2018-05-09 ENCOUNTER — TELEPHONE (OUTPATIENT)
Dept: FAMILY MEDICINE CLINIC | Facility: CLINIC | Age: 53
End: 2018-05-09

## 2018-05-09 NOTE — TELEPHONE ENCOUNTER
Pt requests that we call in his rx to the [facility] in [place] on [address]  Pt's medication alprazolam 0 5 MG was called into [facility] verbal was given to the pharmacist  Printed script form 5/7/18 was shredded

## 2018-06-03 ENCOUNTER — HOSPITAL ENCOUNTER (EMERGENCY)
Facility: HOSPITAL | Age: 53
Discharge: HOME | End: 2018-06-03
Attending: EMERGENCY MEDICINE
Payer: COMMERCIAL

## 2018-06-03 VITALS
WEIGHT: 200 LBS | SYSTOLIC BLOOD PRESSURE: 147 MMHG | HEART RATE: 90 BPM | DIASTOLIC BLOOD PRESSURE: 70 MMHG | OXYGEN SATURATION: 95 % | BODY MASS INDEX: 28.63 KG/M2 | TEMPERATURE: 97.6 F | RESPIRATION RATE: 18 BRPM | HEIGHT: 70 IN

## 2018-06-03 DIAGNOSIS — R79.89 ABNORMAL LIVER FUNCTION TESTS: ICD-10-CM

## 2018-06-03 DIAGNOSIS — R11.10 VOMITING: Primary | ICD-10-CM

## 2018-06-03 LAB
ALBUMIN SERPL BCP-MCNC: 4.7 G/DL (ref 3.5–5)
ALP SERPL-CCNC: 86 U/L (ref 46–116)
ALT SERPL W P-5'-P-CCNC: 26 U/L (ref 12–78)
ANION GAP SERPL CALCULATED.3IONS-SCNC: 17 MMOL/L (ref 4–13)
AST SERPL W P-5'-P-CCNC: 50 U/L (ref 5–45)
BASOPHILS # BLD AUTO: 0.01 THOUSANDS/ΜL (ref 0–0.1)
BASOPHILS NFR BLD AUTO: 0 % (ref 0–1)
BILIRUB SERPL-MCNC: 1.6 MG/DL (ref 0.2–1)
BUN SERPL-MCNC: 10 MG/DL (ref 5–25)
CALCIUM SERPL-MCNC: 10.3 MG/DL (ref 8.3–10.1)
CHLORIDE SERPL-SCNC: 100 MMOL/L (ref 100–108)
CO2 SERPL-SCNC: 22 MMOL/L (ref 21–32)
CREAT SERPL-MCNC: 0.86 MG/DL (ref 0.6–1.3)
EOSINOPHIL # BLD AUTO: 0 THOUSAND/ΜL (ref 0–0.61)
EOSINOPHIL NFR BLD AUTO: 0 % (ref 0–6)
ERYTHROCYTE [DISTWIDTH] IN BLOOD BY AUTOMATED COUNT: 13.2 % (ref 11.6–15.1)
GFR SERPL CREATININE-BSD FRML MDRD: 98 ML/MIN/1.73SQ M
GLUCOSE SERPL-MCNC: 157 MG/DL (ref 65–140)
HCT VFR BLD AUTO: 43.8 % (ref 36.5–49.3)
HGB BLD-MCNC: 15.6 G/DL (ref 12–17)
LIPASE SERPL-CCNC: 101 U/L (ref 73–393)
LYMPHOCYTES # BLD AUTO: 0.52 THOUSANDS/ΜL (ref 0.6–4.47)
LYMPHOCYTES NFR BLD AUTO: 7 % (ref 14–44)
MCH RBC QN AUTO: 34.3 PG (ref 26.8–34.3)
MCHC RBC AUTO-ENTMCNC: 35.6 G/DL (ref 31.4–37.4)
MCV RBC AUTO: 96 FL (ref 82–98)
MONOCYTES # BLD AUTO: 0.28 THOUSAND/ΜL (ref 0.17–1.22)
MONOCYTES NFR BLD AUTO: 4 % (ref 4–12)
NEUTROPHILS # BLD AUTO: 6.69 THOUSANDS/ΜL (ref 1.85–7.62)
NEUTS SEG NFR BLD AUTO: 89 % (ref 43–75)
NRBC BLD AUTO-RTO: 0 /100 WBCS
PLATELET # BLD AUTO: 198 THOUSANDS/UL (ref 149–390)
PMV BLD AUTO: 10.7 FL (ref 8.9–12.7)
POTASSIUM SERPL-SCNC: 5.5 MMOL/L (ref 3.5–5.3)
PROT SERPL-MCNC: 9.4 G/DL (ref 6.4–8.2)
RBC # BLD AUTO: 4.55 MILLION/UL (ref 3.88–5.62)
SODIUM SERPL-SCNC: 139 MMOL/L (ref 136–145)
WBC # BLD AUTO: 7.5 THOUSAND/UL (ref 4.31–10.16)

## 2018-06-03 PROCEDURE — 99283 EMERGENCY DEPT VISIT LOW MDM: CPT

## 2018-06-03 PROCEDURE — 36415 COLL VENOUS BLD VENIPUNCTURE: CPT | Performed by: EMERGENCY MEDICINE

## 2018-06-03 PROCEDURE — 83690 ASSAY OF LIPASE: CPT | Performed by: EMERGENCY MEDICINE

## 2018-06-03 PROCEDURE — 96374 THER/PROPH/DIAG INJ IV PUSH: CPT

## 2018-06-03 PROCEDURE — 85025 COMPLETE CBC W/AUTO DIFF WBC: CPT | Performed by: EMERGENCY MEDICINE

## 2018-06-03 PROCEDURE — 96361 HYDRATE IV INFUSION ADD-ON: CPT

## 2018-06-03 PROCEDURE — 80053 COMPREHEN METABOLIC PANEL: CPT | Performed by: EMERGENCY MEDICINE

## 2018-06-03 RX ORDER — DICYCLOMINE HCL 20 MG
20 TABLET ORAL ONCE
Status: COMPLETED | OUTPATIENT
Start: 2018-06-03 | End: 2018-06-03

## 2018-06-03 RX ORDER — METOCLOPRAMIDE 10 MG/1
10 TABLET ORAL EVERY 6 HOURS PRN
Qty: 12 TABLET | Refills: 0 | Status: SHIPPED | OUTPATIENT
Start: 2018-06-03 | End: 2018-11-27

## 2018-06-03 RX ORDER — DICYCLOMINE HCL 20 MG
20 TABLET ORAL EVERY 6 HOURS PRN
Qty: 12 TABLET | Refills: 0 | Status: SHIPPED | OUTPATIENT
Start: 2018-06-03 | End: 2018-11-27

## 2018-06-03 RX ORDER — METOCLOPRAMIDE HYDROCHLORIDE 5 MG/ML
10 INJECTION INTRAMUSCULAR; INTRAVENOUS ONCE
Status: COMPLETED | OUTPATIENT
Start: 2018-06-03 | End: 2018-06-03

## 2018-06-03 RX ADMIN — DICYCLOMINE HYDROCHLORIDE 20 MG: 20 TABLET ORAL at 16:21

## 2018-06-03 RX ADMIN — SODIUM CHLORIDE 1000 ML: 0.9 INJECTION, SOLUTION INTRAVENOUS at 15:41

## 2018-06-03 RX ADMIN — METOCLOPRAMIDE 10 MG: 5 INJECTION, SOLUTION INTRAMUSCULAR; INTRAVENOUS at 15:41

## 2018-06-03 NOTE — DISCHARGE INSTRUCTIONS
See your doctor for your abnormal liver test  You may need it repeated and you may need an ultrasound  Acute Nausea and Vomiting   WHAT YOU NEED TO KNOW:   Acute nausea and vomiting start suddenly, worsen quickly, and last a short time  DISCHARGE INSTRUCTIONS:   Return to the emergency department if:   · You see blood in your vomit or your bowel movements  · You have sudden, severe pain in your chest and upper abdomen after hard vomiting or retching  · You have swelling in your neck and chest      · You are dizzy, cold, and thirsty and your eyes and mouth are dry  · You are urinating very little or not at all  · You have muscle weakness, leg cramps, and trouble breathing  · Your heart is beating much faster than normal      · You continue to vomit for more than 48 hours  Contact your healthcare provider if:   · You have frequent dry heaves (vomiting but nothing comes out)  · Your nausea and vomiting does not get better or go away after you use medicine  · You have questions or concerns about your condition or treatment  Medicines: You may need any of the following:  · Medicines  may be given to calm your stomach and stop your vomiting  You may also need medicines to help you feel more relaxed or to stop nausea and vomiting caused by motion sickness  · Gastrointestinal stimulants  are used to help empty your stomach and bowels  This may help decrease nausea and vomiting  · Take your medicine as directed  Contact your healthcare provider if you think your medicine is not helping or if you have side effects  Tell him or her if you are allergic to any medicine  Keep a list of the medicines, vitamins, and herbs you take  Include the amounts, and when and why you take them  Bring the list or the pill bottles to follow-up visits  Carry your medicine list with you in case of an emergency  Prevent or manage acute nausea and vomiting:   · Do not drink alcohol    Alcohol may upset or irritate your stomach  Too much alcohol can also cause acute nausea and vomiting  · Control stress  Headaches due to stress may cause nausea and vomiting  Find ways to relax and manage your stress  Get more rest and sleep  · Drink more liquids as directed  Vomiting can lead to dehydration  It is important to drink more liquids to help replace lost body fluids  Ask your healthcare provider how much liquid to drink each day and which liquids are best for you  Your provider may recommend that you drink an oral rehydration solution (ORS)  ORS contains water, salts, and sugar that are needed to replace the lost body fluids  Ask what kind of ORS to use, how much to drink, and where to get it  · Eat smaller meals, more often  Eat small amounts of food every 2 to 3 hours, even if you are not hungry  Food in your stomach may decrease your nausea  · Talk to your healthcare provider before you take over-the-counter (OTC) medicines  These medicines can cause serious problems if you use certain other medicines, or you have a medical condition  You may have problems if you use too much or use them for longer than the label says  Follow directions on the label carefully  Follow up with your healthcare provider as directed:  Write down your questions so you remember to ask them during your follow-up visits  © 2017 [employer]  Information is for End User's use only and may not be sold, redistributed or otherwise used for commercial purposes  All illustrations and images included in CareNotes® are the copyrighted property of [employer]  or Gabriel Candel[employer]  The above information is an  only  It is not intended as medical advice for individual conditions or treatments  Talk to your doctor, nurse or pharmacist before following any medical regimen to see if it is safe and effective for you  Liver Profile   WHAT YOU NEED TO KNOW:   What is a liver profile?   A liver profile is a group of blood tests that show how well your liver is working  The liver makes enzymes and bile that help digest food and gives your body energy  It also removes harmful material from your body, such as alcohol and other chemicals  What blood tests are part of a liver profile? · Liver enzyme tests  measure alanine aminotransferase (ALT), alkaline phosphatase (ALP), and aspartate aminotransferase (AST) enzymes  These tests may also include gamma-glutamyl transpeptidase (GGT)  · Liver protein tests  measure albumin and other proteins in your blood  This includes antibodies that help to fight infections  · Bilirubin tests  measure the amount of bilirubin in your blood  Bilirubin is a yellow fluid made in your body when red blood cells break down  How do I get ready for the test?  Healthcare providers may tell you not to eat or drink anything, except water, after midnight  Several medicines can affect the results of your liver function tests  Ask your healthcare provider if you should wait to take your medicines until after your blood is taken  Wear a short-sleeved or loose shirt on the day of the test  This will make it easier to draw your blood  What do abnormal test results mean? Abnormal levels of liver enzymes, proteins, or bilirubin may be a sign of liver damage or disease  You may need another liver profile or other tests to find the cause of your abnormal test results  CARE AGREEMENT:   You have the right to help plan your care  To help with this plan, you must learn about your lab tests  You can then discuss the results with your caregivers  Work with them to decide what care may be used to treat you  You always have the right to refuse treatment  The above information is an  only  It is not intended as medical advice for individual conditions or treatments   Talk to your doctor, nurse or pharmacist before following any medical regimen to see if it is safe and effective for you   © 2017 [facility] [facility] Information is for End User's use only and may not be sold, redistributed or otherwise used for commercial purposes  All illustrations and images included in CareNotes® are the copyrighted property of [employer]  or Gabriel Candelaria

## 2018-06-03 NOTE — ED PROVIDER NOTES
History  Chief Complaint   Patient presents with    Vomiting     Nausea, vomiting and dryheaving "nonstop" since 0700  Generalized abd pain  Denies ill contacts, fevers  History provided by:  Patient   used: No    Vomiting   Severity:  Severe  Duration: several hours  Timing:  Constant  Quality:  Stomach contents  Progression:  Unchanged  Chronicity:  New  Recent urination:  Decreased  Relieved by:  Nothing  Exacerbated by: Eating or drinking  Ineffective treatments: Zofran  Associated symptoms: abdominal pain, chills and diarrhea    Associated symptoms: no cough, no fever, no headaches and no sore throat    Risk factors: alcohol use    Risk factors: no sick contacts, no suspect food intake and no travel to endemic areas        Prior to Admission Medications   Prescriptions Last Dose Informant Patient Reported? Taking?    ALPRAZolam (XANAX) 0 5 mg tablet   No Yes   Sig: Take 1 tablet (0 5 mg total) by mouth daily as needed for anxiety   levothyroxine 150 mcg tablet  Self No Yes   Sig: Take 1 tablet (150 mcg total) by mouth daily   oxyCODONE (ROXICODONE) 10 MG TABS   No Yes   Sig: Take 1 tablet (10 mg total) by mouth every 8 (eight) hours as needed for severe pain Max Daily Amount: 30 mg      Facility-Administered Medications: None       Past Medical History:   Diagnosis Date    Abnormal glucose     last assessed-12/5/2013    Acute infective pharyngitis     last assessed-5/19/2013    Disease of thyroid gland     Elevated blood pressure reading without diagnosis of hypertension     last assessed-12/5/2013    Enlarged prostate     last assessed-12/2/2013    Exposure to STD     last assessed-3/30/2016    Hypertension     Injury of blood vessel at forearm level     last assessed-3/30/2013       Past Surgical History:   Procedure Laterality Date    NO PAST SURGERIES         Family History   Problem Relation Age of Onset    No Known Problems Mother     Kidney failure Father      I have reviewed and agree with the history as documented  Social History   Substance Use Topics    Smoking status: Never Smoker    Smokeless tobacco: Never Used    Alcohol use Yes      Comment: socially        Review of Systems   Constitutional: Positive for appetite change and chills  Negative for fever  HENT: Negative for congestion and sore throat  Respiratory: Negative for cough, chest tightness and shortness of breath  Cardiovascular: Negative for chest pain  Gastrointestinal: Positive for abdominal pain, diarrhea, nausea and vomiting  Genitourinary: Negative for dysuria and flank pain  Musculoskeletal: Negative for back pain and neck pain  Skin: Negative for rash  Neurological: Negative for weakness, numbness and headaches  All other systems reviewed and are negative  Physical Exam  Physical Exam   Constitutional: He appears well-developed and well-nourished  He is cooperative  Non-toxic appearance  He does not have a sickly appearance  He does not appear ill  He appears distressed  HENT:   Head: Normocephalic and atraumatic  Right Ear: Hearing normal  No drainage or swelling  Left Ear: Hearing normal  No drainage or swelling  Mouth/Throat: Mucous membranes are normal    Eyes: Conjunctivae and lids are normal  Right eye exhibits no discharge  Left eye exhibits no discharge  Neck: Trachea normal and normal range of motion  No JVD present  Cardiovascular: Normal rate, regular rhythm, normal heart sounds, intact distal pulses and normal pulses  Exam reveals no gallop and no friction rub  No murmur heard  Pulmonary/Chest: Effort normal and breath sounds normal  No stridor  No respiratory distress  He has no wheezes  He has no rales  Abdominal: Soft  Normal appearance and bowel sounds are normal  He exhibits no distension, no ascites and no mass  There is no hepatosplenomegaly  There is tenderness in the epigastric area   There is no rebound, no guarding and no CVA tenderness  Musculoskeletal: Normal range of motion  He exhibits no edema  Lymphadenopathy:        Right: No inguinal adenopathy present  Left: No inguinal adenopathy present  Neurological: He is alert  He has normal strength  He exhibits normal muscle tone  Gait normal  GCS eye subscore is 4  GCS verbal subscore is 5  GCS motor subscore is 6  Skin: Skin is warm, dry and intact  No rash noted  He is not diaphoretic  No pallor  Psychiatric: He has a normal mood and affect  His speech is normal  Cognition and memory are normal    Nursing note and vitals reviewed        Vital Signs  ED Triage Vitals   Temperature Pulse Respirations Blood Pressure SpO2   06/03/18 1350 06/03/18 1350 06/03/18 1350 06/03/18 1350 06/03/18 1350   97 6 °F (36 4 °C) 65 18 (!) 190/91 99 %      Temp Source Heart Rate Source Patient Position - Orthostatic VS BP Location FiO2 (%)   06/03/18 1350 06/03/18 1350 06/03/18 1740 06/03/18 1350 --   Temporal Monitor Lying Right arm       Pain Score       06/03/18 1350       5           Vitals:    06/03/18 1350 06/03/18 1740   BP: (!) 190/91 147/70   Pulse: 65 90   Patient Position - Orthostatic VS:  Lying       Visual Acuity      ED Medications  Medications   sodium chloride 0 9 % bolus 1,000 mL (0 mL Intravenous Stopped 6/3/18 1738)   metoclopramide (REGLAN) injection 10 mg (10 mg Intravenous Given 6/3/18 1541)   dicyclomine (BENTYL) tablet 20 mg (20 mg Oral Given 6/3/18 1621)       Diagnostic Studies  Results Reviewed     Procedure Component Value Units Date/Time    Lipase [[id number]]  (Normal) Collected:  06/03/18 1541    Lab Status:  Final result Specimen:  Blood from Arm, Right Updated:  06/03/18 1610     Lipase 101 u/L     Comprehensive metabolic panel [[id number]]  (Abnormal) Collected:  06/03/18 1541    Lab Status:  Final result Specimen:  Blood from Arm, Right Updated:  06/03/18 1610     Sodium 139 mmol/L      Potassium 5 5 (H) mmol/L      Chloride 100 mmol/L      CO2 22 mmol/L Anion Gap 17 (H) mmol/L      BUN 10 mg/dL      Creatinine 0 86 mg/dL      Glucose 157 (H) mg/dL      Calcium 10 3 (H) mg/dL      AST 50 (H) U/L      ALT 26 U/L      Alkaline Phosphatase 86 U/L      Total Protein 9 4 (H) g/dL      Albumin 4 7 g/dL      Total Bilirubin 1 60 (H) mg/dL      eGFR 98 ml/min/1 73sq m     Narrative:         National Kidney Disease Education Program recommendations are as follows:  GFR calculation is accurate only with a steady state creatinine  Chronic Kidney disease less than 60 ml/min/1 73 sq  meters  Kidney failure less than 15 ml/min/1 73 sq  meters  CBC and differential [[id number]]  (Abnormal) Collected:  06/03/18 1541    Lab Status:  Final result Specimen:  Blood from Arm, Right Updated:  06/03/18 1550     WBC 7 50 Thousand/uL      RBC 4 55 Million/uL      Hemoglobin 15 6 g/dL      Hematocrit 43 8 %      MCV 96 fL      MCH 34 3 pg      MCHC 35 6 g/dL      RDW 13 2 %      MPV 10 7 fL      Platelets 562 Thousands/uL      nRBC 0 /100 WBCs      Neutrophils Relative 89 (H) %      Lymphocytes Relative 7 (L) %      Monocytes Relative 4 %      Eosinophils Relative 0 %      Basophils Relative 0 %      Neutrophils Absolute 6 69 Thousands/µL      Lymphocytes Absolute 0 52 (L) Thousands/µL      Monocytes Absolute 0 28 Thousand/µL      Eosinophils Absolute 0 00 Thousand/µL      Basophils Absolute 0 01 Thousands/µL                  No orders to display              Procedures  Procedures       Phone Contacts  ED Phone Contact    ED Course                               MDM  Number of Diagnoses or Management Options  Abnormal liver function tests:   Vomiting:   Diagnosis management comments: The patient is feeling much better after treatment with IV fluids and medications  No evidence of pancreatitis         Amount and/or Complexity of Data Reviewed  Clinical lab tests: reviewed and ordered    Patient Progress  Patient progress: stable    CritCare Time    Disposition  Final diagnoses:   Vomiting Abnormal liver function tests     Time reflects when diagnosis was documented in both MDM as applicable and the Disposition within this note     Time User Action Codes Description Comment    6/3/2018  4:43 PM Dana Amador Add [R11 10] Vomiting     6/3/2018  4:43 PM Dana Amador Add [R94 5] Abnormal liver function tests       ED Disposition     ED Disposition Condition Comment    Discharge  [address] discharge to home/self care  Condition at discharge: Good        Follow-up Information     Follow up With Specialties Details Why Contact Info    Kady Jha,  Family Medicine Schedule an appointment as soon as possible for a visit in 1 week If symptoms worsen [address] [facility]  [address] [facility]  [phone]            Discharge Medication List as of 6/3/2018  4:47 PM      START taking these medications    Details   dicyclomine (BENTYL) 20 mg tablet Take 1 tablet (20 mg total) by mouth every 6 (six) hours as needed (abd pain) for up to 12 doses, Starting Sun 6/3/2018, Print      metoclopramide (REGLAN) 10 mg tablet Take 1 tablet (10 mg total) by mouth every 6 (six) hours as needed (vomiting) for up to 12 doses, Starting Sun 6/3/2018, Print         CONTINUE these medications which have NOT CHANGED    Details   ALPRAZolam (XANAX) 0 5 mg tablet Take 1 tablet (0 5 mg total) by mouth daily as needed for anxiety, Starting Tue 5/8/2018, Print      levothyroxine 150 mcg tablet Take 1 tablet (150 mcg total) by mouth daily, Starting Thu 3/22/2018, Print      oxyCODONE (ROXICODONE) 10 MG TABS Take 1 tablet (10 mg total) by mouth every 8 (eight) hours as needed for severe pain Max Daily Amount: 30 mg, Starting Mon 5/7/2018, Normal           No discharge procedures on file      ED Provider  Electronically Signed by           Robi Núñez MD  06/04/18 7675

## 2018-06-03 NOTE — ED NOTES
Reports nausea improved after medication complains of "slight" nausea      Iman Mccoy, RN  06/03/18 2625

## 2018-06-26 DIAGNOSIS — F40.243 FEAR OF FLYING: ICD-10-CM

## 2018-06-26 DIAGNOSIS — E03.9 HYPOTHYROIDISM, UNSPECIFIED TYPE: ICD-10-CM

## 2018-06-26 DIAGNOSIS — M54.12 CERVICAL RADICULOPATHY: ICD-10-CM

## 2018-06-26 RX ORDER — OXYCODONE HYDROCHLORIDE 10 MG/1
10 TABLET ORAL EVERY 8 HOURS PRN
Qty: 60 TABLET | Refills: 0 | Status: SHIPPED | OUTPATIENT
Start: 2018-06-26 | End: 2018-08-09 | Stop reason: SDUPTHER

## 2018-06-26 RX ORDER — LEVOTHYROXINE SODIUM 0.15 MG/1
150 TABLET ORAL DAILY
Qty: 90 TABLET | Refills: 1 | Status: SHIPPED | OUTPATIENT
Start: 2018-06-26 | End: 2018-06-28 | Stop reason: SDUPTHER

## 2018-06-26 RX ORDER — ALPRAZOLAM 0.5 MG/1
0.5 TABLET ORAL DAILY PRN
Qty: 30 TABLET | Refills: 0 | Status: SHIPPED | OUTPATIENT
Start: 2018-06-26 | End: 2018-08-09 | Stop reason: SDUPTHER

## 2018-06-27 LAB
ALBUMIN SERPL-MCNC: 4.7 G/DL (ref 3.6–5.1)
ALBUMIN/GLOB SERPL: 1.7 (CALC) (ref 1–2.5)
ALP SERPL-CCNC: 79 U/L (ref 40–115)
ALT SERPL-CCNC: 14 U/L (ref 9–46)
AST SERPL-CCNC: 20 U/L (ref 10–35)
BILIRUB SERPL-MCNC: 1.2 MG/DL (ref 0.2–1.2)
BUN SERPL-MCNC: 10 MG/DL (ref 7–25)
BUN/CREAT SERPL: ABNORMAL (CALC) (ref 6–22)
CALCIUM SERPL-MCNC: 10 MG/DL (ref 8.6–10.3)
CHLORIDE SERPL-SCNC: 103 MMOL/L (ref 98–110)
CHOLEST SERPL-MCNC: 296 MG/DL
CHOLEST/HDLC SERPL: 3.8 (CALC)
CO2 SERPL-SCNC: 28 MMOL/L (ref 20–31)
CREAT SERPL-MCNC: 0.74 MG/DL (ref 0.7–1.33)
ERYTHROCYTE [DISTWIDTH] IN BLOOD BY AUTOMATED COUNT: 12.3 % (ref 11–15)
GLOBULIN SER CALC-MCNC: 2.8 G/DL (CALC) (ref 1.9–3.7)
GLUCOSE SERPL-MCNC: 110 MG/DL (ref 65–99)
HCT VFR BLD AUTO: 44.9 % (ref 38.5–50)
HDLC SERPL-MCNC: 77 MG/DL
HGB BLD-MCNC: 15.4 G/DL (ref 13.2–17.1)
HIV 1+2 AB+HIV1 P24 AG SERPL QL IA: NORMAL
LDLC SERPL CALC-MCNC: 183 MG/DL (CALC)
MCH RBC QN AUTO: 33.5 PG (ref 27–33)
MCHC RBC AUTO-ENTMCNC: 34.3 G/DL (ref 32–36)
MCV RBC AUTO: 97.6 FL (ref 80–100)
NONHDLC SERPL-MCNC: 219 MG/DL (CALC)
PLATELET # BLD AUTO: 239 THOUSAND/UL (ref 140–400)
PMV BLD REES-ECKER: 10.3 FL (ref 7.5–12.5)
POTASSIUM SERPL-SCNC: 5.4 MMOL/L (ref 3.5–5.3)
PROT SERPL-MCNC: 7.5 G/DL (ref 6.1–8.1)
RBC # BLD AUTO: 4.6 MILLION/UL (ref 4.2–5.8)
SL AMB EGFR AFRICAN AMERICAN: 120 ML/MIN/1.73M2
SL AMB EGFR NON AFRICAN AMERICAN: 104 ML/MIN/1.73M2
SODIUM SERPL-SCNC: 141 MMOL/L (ref 135–146)
T4 FREE SERPL-MCNC: 1.8 NG/DL (ref 0.8–1.8)
TRIGL SERPL-MCNC: 196 MG/DL
TSH SERPL-ACNC: 0.29 MIU/L (ref 0.4–4.5)
WBC # BLD AUTO: 5.1 THOUSAND/UL (ref 3.8–10.8)

## 2018-06-28 DIAGNOSIS — E03.9 HYPOTHYROIDISM, UNSPECIFIED TYPE: ICD-10-CM

## 2018-06-28 RX ORDER — LEVOTHYROXINE SODIUM 137 UG/1
137 TABLET ORAL DAILY
Qty: 30 TABLET | Refills: 5 | Status: SHIPPED | OUTPATIENT
Start: 2018-06-28 | End: 2018-07-03 | Stop reason: SDUPTHER

## 2018-07-02 DIAGNOSIS — E03.9 HYPOTHYROIDISM, UNSPECIFIED TYPE: ICD-10-CM

## 2018-07-02 RX ORDER — LEVOTHYROXINE SODIUM 0.15 MG/1
150 TABLET ORAL DAILY
Qty: 90 TABLET | Refills: 1 | Status: SHIPPED | OUTPATIENT
Start: 2018-07-02 | End: 2018-07-03

## 2018-07-02 NOTE — TELEPHONE ENCOUNTER
Pt called and his levothyroxine went to [facility] and it should have gone to [facility] [place]  Please send rx to [facility]

## 2018-07-03 DIAGNOSIS — E03.9 HYPOTHYROIDISM, UNSPECIFIED TYPE: ICD-10-CM

## 2018-07-03 RX ORDER — LEVOTHYROXINE SODIUM 137 UG/1
137 TABLET ORAL DAILY
Qty: 30 TABLET | Refills: 5 | Status: SHIPPED | OUTPATIENT
Start: 2018-07-03 | End: 2018-07-09 | Stop reason: SDUPTHER

## 2018-07-09 DIAGNOSIS — E03.9 HYPOTHYROIDISM, UNSPECIFIED TYPE: ICD-10-CM

## 2018-07-09 RX ORDER — LEVOTHYROXINE SODIUM 137 UG/1
137 TABLET ORAL DAILY
Qty: 90 TABLET | Refills: 1 | Status: SHIPPED | OUTPATIENT
Start: 2018-07-09 | End: 2019-01-16 | Stop reason: SDUPTHER

## 2018-08-09 DIAGNOSIS — F40.243 FEAR OF FLYING: ICD-10-CM

## 2018-08-09 DIAGNOSIS — M54.12 CERVICAL RADICULOPATHY: ICD-10-CM

## 2018-08-09 RX ORDER — OXYCODONE HYDROCHLORIDE 10 MG/1
10 TABLET ORAL EVERY 8 HOURS PRN
Qty: 60 TABLET | Refills: 0 | Status: SHIPPED | OUTPATIENT
Start: 2018-08-09 | End: 2018-09-19 | Stop reason: SDUPTHER

## 2018-08-09 RX ORDER — ALPRAZOLAM 0.5 MG/1
0.5 TABLET ORAL DAILY PRN
Qty: 30 TABLET | Refills: 0 | Status: SHIPPED | OUTPATIENT
Start: 2018-08-09 | End: 2018-09-19 | Stop reason: SDUPTHER

## 2018-09-19 DIAGNOSIS — F40.243 FEAR OF FLYING: ICD-10-CM

## 2018-09-19 DIAGNOSIS — M54.12 CERVICAL RADICULOPATHY: ICD-10-CM

## 2018-09-20 RX ORDER — OXYCODONE HYDROCHLORIDE 10 MG/1
10 TABLET ORAL EVERY 8 HOURS PRN
Qty: 60 TABLET | Refills: 0 | Status: SHIPPED | OUTPATIENT
Start: 2018-09-20 | End: 2018-11-07 | Stop reason: SDUPTHER

## 2018-09-20 RX ORDER — ALPRAZOLAM 0.5 MG/1
0.5 TABLET ORAL DAILY PRN
Qty: 30 TABLET | Refills: 0 | Status: SHIPPED | OUTPATIENT
Start: 2018-09-20 | End: 2018-11-07 | Stop reason: SDUPTHER

## 2018-10-29 DIAGNOSIS — E78.2 MIXED HYPERLIPIDEMIA: Primary | ICD-10-CM

## 2018-10-29 DIAGNOSIS — E03.9 HYPOTHYROIDISM, UNSPECIFIED TYPE: ICD-10-CM

## 2018-10-29 DIAGNOSIS — Z11.4 SCREENING FOR HIV (HUMAN IMMUNODEFICIENCY VIRUS): ICD-10-CM

## 2018-10-29 DIAGNOSIS — Z72.53 HIGH RISK BISEXUAL BEHAVIOR: ICD-10-CM

## 2018-11-07 DIAGNOSIS — M54.12 CERVICAL RADICULOPATHY: ICD-10-CM

## 2018-11-07 DIAGNOSIS — F40.243 FEAR OF FLYING: ICD-10-CM

## 2018-11-07 RX ORDER — ALPRAZOLAM 0.5 MG/1
0.5 TABLET ORAL DAILY PRN
Qty: 30 TABLET | Refills: 0 | Status: SHIPPED | OUTPATIENT
Start: 2018-11-07 | End: 2018-12-06 | Stop reason: SDUPTHER

## 2018-11-07 RX ORDER — OXYCODONE HYDROCHLORIDE 10 MG/1
10 TABLET ORAL EVERY 8 HOURS PRN
Qty: 60 TABLET | Refills: 0 | Status: SHIPPED | OUTPATIENT
Start: 2018-11-07 | End: 2018-12-06 | Stop reason: SDUPTHER

## 2018-11-27 ENCOUNTER — OFFICE VISIT (OUTPATIENT)
Dept: FAMILY MEDICINE CLINIC | Facility: CLINIC | Age: 53
End: 2018-11-27
Payer: COMMERCIAL

## 2018-11-27 VITALS
DIASTOLIC BLOOD PRESSURE: 88 MMHG | TEMPERATURE: 97.9 F | WEIGHT: 208.5 LBS | HEIGHT: 69 IN | OXYGEN SATURATION: 98 % | RESPIRATION RATE: 16 BRPM | BODY MASS INDEX: 30.88 KG/M2 | HEART RATE: 83 BPM | SYSTOLIC BLOOD PRESSURE: 132 MMHG

## 2018-11-27 DIAGNOSIS — E78.2 MIXED HYPERLIPIDEMIA: ICD-10-CM

## 2018-11-27 DIAGNOSIS — Z72.52 HIGH RISK HOMOSEXUAL BEHAVIOR: ICD-10-CM

## 2018-11-27 DIAGNOSIS — E03.9 HYPOTHYROIDISM, UNSPECIFIED TYPE: Primary | ICD-10-CM

## 2018-11-27 DIAGNOSIS — M54.12 CERVICAL RADICULOPATHY: ICD-10-CM

## 2018-11-27 LAB
ALBUMIN SERPL-MCNC: 4.9 G/DL (ref 3.6–5.1)
ALBUMIN/GLOB SERPL: 1.8 (CALC) (ref 1–2.5)
ALP SERPL-CCNC: 85 U/L (ref 40–115)
ALT SERPL-CCNC: 23 U/L (ref 9–46)
AST SERPL-CCNC: 32 U/L (ref 10–35)
BILIRUB SERPL-MCNC: 1.4 MG/DL (ref 0.2–1.2)
BUN SERPL-MCNC: 12 MG/DL (ref 7–25)
BUN/CREAT SERPL: ABNORMAL (CALC) (ref 6–22)
CALCIUM SERPL-MCNC: 10.1 MG/DL (ref 8.6–10.3)
CHLORIDE SERPL-SCNC: 101 MMOL/L (ref 98–110)
CHOLEST SERPL-MCNC: 289 MG/DL
CHOLEST/HDLC SERPL: 3.9 (CALC)
CO2 SERPL-SCNC: 30 MMOL/L (ref 20–32)
CREAT SERPL-MCNC: 0.84 MG/DL (ref 0.7–1.33)
GLOBULIN SER CALC-MCNC: 2.7 G/DL (CALC) (ref 1.9–3.7)
GLUCOSE SERPL-MCNC: 115 MG/DL (ref 65–99)
HDLC SERPL-MCNC: 74 MG/DL
HIV 1+2 AB+HIV1 P24 AG SERPL QL IA: NORMAL
LDLC SERPL CALC-MCNC: 177 MG/DL (CALC)
NONHDLC SERPL-MCNC: 215 MG/DL (CALC)
POTASSIUM SERPL-SCNC: 4.6 MMOL/L (ref 3.5–5.3)
PROT SERPL-MCNC: 7.6 G/DL (ref 6.1–8.1)
SL AMB EGFR AFRICAN AMERICAN: 114 ML/MIN/1.73M2
SL AMB EGFR NON AFRICAN AMERICAN: 99 ML/MIN/1.73M2
SODIUM SERPL-SCNC: 141 MMOL/L (ref 135–146)
TRIGL SERPL-MCNC: 227 MG/DL
TSH SERPL-ACNC: 2.48 MIU/L (ref 0.4–4.5)

## 2018-11-27 PROCEDURE — 99214 OFFICE O/P EST MOD 30 MIN: CPT | Performed by: FAMILY MEDICINE

## 2018-11-27 NOTE — PROGRESS NOTES
Assessment/Plan:   1  Mixed hyperlipidemia  Reviewed fasting lipid panel patient today  His cholesterol levels appear mildly increasing  At this time, he was advised on the great importance of maintaining a very strict low-fat/low-cholesterol diet  He states that he is currently overweight  He will continue to work on his diet and exercise  Will recheck his blood work in 6 months  If his cholesterol levels are still elevated, will consider further treatment  2  Cervical radiculopathy  Patient's cervical radiculopathy has been stable  He still develops mild pain periodically  He has been taking his oxycodone for symptom relief  Will continue with his current treatment today  He denies any sedated of/adverse reactions  3  Hypothyroidism, unspecified type  Patient's symptoms today appear stable  Dot will continue routine monitoring  4  High risk homosexual behavior  Reviewed patient's blood work  His recent HIV testing was negative  Follow-up in 6 months or travis Garland There are no diagnoses linked to this encounter  Subjective:    Chief Complaint   Patient presents with    Follow-up     Patient presents for a 6 month follow up and blood work review  Patient ID: Mahi Obrien is a 52 y o  male  Patient is a 57-year-old presents today for follow-up on his chronic conditions  He has dyslipidemia, chronic cervical radiculopathy hypothyroidism as well as high risk sexual behavior  He has been taking his medications regularly  He denies adverse reactions with medications  He recently had blood work completed in light reviewed this today  Review of Systems   Constitutional: Negative for activity change, chills, fatigue and fever  HENT: Negative for congestion, ear pain, sinus pressure and sore throat  Eyes: Negative for redness, itching and visual disturbance  Respiratory: Negative for cough and shortness of breath      Cardiovascular: Negative for chest pain and palpitations  Gastrointestinal: Negative for abdominal pain, diarrhea and nausea  Endocrine: Negative for cold intolerance and heat intolerance  Genitourinary: Negative for dysuria, flank pain and frequency  Musculoskeletal: Negative for arthralgias, back pain, gait problem and myalgias  Skin: Negative for color change  Allergic/Immunologic: Negative for environmental allergies  Neurological: Negative for dizziness, numbness and headaches  Psychiatric/Behavioral: Negative for behavioral problems and sleep disturbance  The following portions of the patient's history were reviewed and updated as appropriate : past family history, past medical history, past social history and past surgical history  Current Outpatient Prescriptions:     ALPRAZolam (XANAX) 0 5 mg tablet, Take 1 tablet (0 5 mg total) by mouth daily as needed for anxiety, Disp: 30 tablet, Rfl: 0    levothyroxine 137 mcg tablet, Take 1 tablet (137 mcg total) by mouth daily, Disp: 90 tablet, Rfl: 1    oxyCODONE (ROXICODONE) 10 MG TABS, Take 1 tablet (10 mg total) by mouth every 8 (eight) hours as needed for severe pain Max Daily Amount: 30 mg, Disp: 60 tablet, Rfl: 0    Objective:    Vitals:    11/27/18 1140   BP: 132/88   BP Location: Left arm   Patient Position: Sitting   Cuff Size: Standard   Pulse: 83   Resp: 16   Temp: 97 9 °F (36 6 °C)   TempSrc: Tympanic   SpO2: 98%   Weight: 94 6 kg (208 lb 8 oz)   Height: 5' 9 29" (1 76 m)        Physical Exam   Constitutional: He is oriented to person, place, and time  Vital signs are normal  He appears well-developed and well-nourished  HENT:   Head: Normocephalic and atraumatic  Right Ear: Hearing normal    Left Ear: Hearing normal    Nose: Nose normal  No septal deviation  Mouth/Throat: Oropharynx is clear and moist and mucous membranes are normal    Eyes: Pupils are equal, round, and reactive to light   EOM and lids are normal    Neck: Trachea normal and normal range of motion  No thyroid mass and no thyromegaly present  Cardiovascular: Normal rate  Pulmonary/Chest: Effort normal  No respiratory distress  He has no decreased breath sounds  Abdominal: Normal appearance  There is no guarding  Musculoskeletal: Normal range of motion  Neurological: He is alert and oriented to person, place, and time  He has normal strength  No cranial nerve deficit or sensory deficit  Skin: Skin is warm and dry  Psychiatric: He has a normal mood and affect   His speech is normal and behavior is normal  Judgment and thought content normal  Cognition and memory are normal

## 2018-12-06 DIAGNOSIS — M54.12 CERVICAL RADICULOPATHY: ICD-10-CM

## 2018-12-06 DIAGNOSIS — F40.243 FEAR OF FLYING: ICD-10-CM

## 2018-12-06 RX ORDER — OXYCODONE HYDROCHLORIDE 10 MG/1
10 TABLET ORAL EVERY 8 HOURS PRN
Qty: 60 TABLET | Refills: 0 | Status: SHIPPED | OUTPATIENT
Start: 2018-12-06 | End: 2019-01-16 | Stop reason: SDUPTHER

## 2018-12-06 RX ORDER — ALPRAZOLAM 0.5 MG/1
0.5 TABLET ORAL DAILY PRN
Qty: 30 TABLET | Refills: 0 | Status: SHIPPED | OUTPATIENT
Start: 2018-12-06 | End: 2019-01-16 | Stop reason: SDUPTHER

## 2019-01-16 DIAGNOSIS — E03.9 HYPOTHYROIDISM, UNSPECIFIED TYPE: ICD-10-CM

## 2019-01-16 DIAGNOSIS — M54.12 CERVICAL RADICULOPATHY: ICD-10-CM

## 2019-01-16 DIAGNOSIS — F40.243 FEAR OF FLYING: ICD-10-CM

## 2019-01-17 RX ORDER — OXYCODONE HYDROCHLORIDE 10 MG/1
10 TABLET ORAL EVERY 8 HOURS PRN
Qty: 60 TABLET | Refills: 0 | Status: SHIPPED | OUTPATIENT
Start: 2019-01-17 | End: 2019-03-01 | Stop reason: SDUPTHER

## 2019-01-17 RX ORDER — ALPRAZOLAM 0.5 MG/1
0.5 TABLET ORAL DAILY PRN
Qty: 30 TABLET | Refills: 0 | Status: SHIPPED | OUTPATIENT
Start: 2019-01-17 | End: 2019-03-01 | Stop reason: SDUPTHER

## 2019-01-17 RX ORDER — LEVOTHYROXINE SODIUM 137 UG/1
137 TABLET ORAL DAILY
Qty: 90 TABLET | Refills: 1 | Status: SHIPPED | OUTPATIENT
Start: 2019-01-17 | End: 2019-07-18 | Stop reason: SDUPTHER

## 2019-02-20 ENCOUNTER — OFFICE VISIT (OUTPATIENT)
Dept: FAMILY MEDICINE CLINIC | Facility: CLINIC | Age: 54
End: 2019-02-20
Payer: COMMERCIAL

## 2019-02-20 VITALS
BODY MASS INDEX: 30.96 KG/M2 | WEIGHT: 209 LBS | HEIGHT: 69 IN | HEART RATE: 96 BPM | TEMPERATURE: 100.3 F | OXYGEN SATURATION: 98 % | SYSTOLIC BLOOD PRESSURE: 148 MMHG | RESPIRATION RATE: 16 BRPM | DIASTOLIC BLOOD PRESSURE: 82 MMHG

## 2019-02-20 DIAGNOSIS — J40 BRONCHITIS: ICD-10-CM

## 2019-02-20 DIAGNOSIS — J01.00 ACUTE MAXILLARY SINUSITIS, RECURRENCE NOT SPECIFIED: Primary | ICD-10-CM

## 2019-02-20 DIAGNOSIS — I10 HYPERTENSION, UNSPECIFIED TYPE: ICD-10-CM

## 2019-02-20 PROCEDURE — 99213 OFFICE O/P EST LOW 20 MIN: CPT | Performed by: NURSE PRACTITIONER

## 2019-02-20 RX ORDER — AMOXICILLIN 875 MG/1
875 TABLET, COATED ORAL 2 TIMES DAILY
Qty: 20 TABLET | Refills: 0 | Status: SHIPPED | OUTPATIENT
Start: 2019-02-20 | End: 2019-03-02

## 2019-02-20 NOTE — PROGRESS NOTES
[facility]    ASSESSMENT AND PLAN     1  Acute maxillary sinusitis, recurrence not specified  52-year-old female presents today with signs and symptoms suggestive of an acute sinusitis/bronchitis  Physical assessment is as documented below  Rx given for 10 day course of amoxicillin  Symptom management reviewed: Increased rest, increase fluids, may take OTC antitussive/expectorant, Tylenol and/or ibuprofen, warm salt water gargles  Advised patient be re-evaluated should her symptoms change, persist and/or worsen  - amoxicillin (AMOXIL) 875 mg tablet; Take 1 tablet (875 mg total) by mouth 2 (two) times a day for 10 days  Dispense: 20 tablet; Refill: 0    2  Bronchitis  As above  - amoxicillin (AMOXIL) 875 mg tablet; Take 1 tablet (875 mg total) by mouth 2 (two) times a day for 10 days  Dispense: 20 tablet; Refill: 0       3  Hypertension, unspecified type  Patient's BP noted to be elevated today  May be elevated secondary to illness/fever  Trends reviewed and systolic does seem to range in the 140s  Patient is not currently on any medication at this time  Will need to be closely monitored  Discussed with patient  SUBJECTIVE       Patient ID: Anusha Jarrell is a 52 y o  male  Chief Complaint   Patient presents with    Cold Like Symptoms     cough, congested x 2 days       HISTORY OF PRESENT ILLNESS    Patient presents today with sinus congestion, sore throat and a productive cough  He states his symptoms started a few days ago  The cough is productive for thick secretions and is worse at HS  Denies fever  Denies any GI distress        The following portions of the patient's history were reviewed and updated as appropriate: allergies, current medications, past medical history and problem list     REVIEW OF SYSTEMS  Review of Systems   Constitutional: Negative  HENT: Positive for congestion, postnasal drip, sinus pressure, sinus pain and sore throat  Respiratory: Negative  Cardiovascular: Negative  Neurological: Negative  Psychiatric/Behavioral: Negative  OBJECTIVE      VITAL SIGNS  /82 (BP Location: Right arm)   Pulse 96   Temp 100 3 °F (37 9 °C) (Tympanic)   Resp 16   Ht 5' 9 29" (1 76 m)   Wt 94 8 kg (209 lb)   SpO2 98%   BMI 30 61 kg/m²     CURRENT MEDICATIONS    Current Outpatient Medications:     ALPRAZolam (XANAX) 0 5 mg tablet, Take 1 tablet (0 5 mg total) by mouth daily as needed for anxiety, Disp: 30 tablet, Rfl: 0    levothyroxine 137 mcg tablet, Take 1 tablet (137 mcg total) by mouth daily, Disp: 90 tablet, Rfl: 1    oxyCODONE (ROXICODONE) 10 MG TABS, Take 1 tablet (10 mg total) by mouth every 8 (eight) hours as needed for severe pain Max Daily Amount: 30 mg, Disp: 60 tablet, Rfl: 0    amoxicillin (AMOXIL) 875 mg tablet, Take 1 tablet (875 mg total) by mouth 2 (two) times a day for 10 days, Disp: 20 tablet, Rfl: 0      PHYSICAL EXAMINATION   Physical Exam   Constitutional: He is oriented to person, place, and time  He appears well-developed and well-nourished  HENT:   Head: Normocephalic  Right Ear: Hearing, tympanic membrane, external ear and ear canal normal  No middle ear effusion  Left Ear: Hearing, tympanic membrane, external ear and ear canal normal   No middle ear effusion  Nose: Mucosal edema present  Right sinus exhibits maxillary sinus tenderness and frontal sinus tenderness  Left sinus exhibits maxillary sinus tenderness and frontal sinus tenderness  Mouth/Throat: Uvula is midline, oropharynx is clear and moist and mucous membranes are normal    Eyes: Conjunctivae are normal  Right eye exhibits no discharge  Left eye exhibits no discharge  Cardiovascular: Normal rate and regular rhythm  Pulmonary/Chest: Effort normal and breath sounds normal  No respiratory distress  He has no decreased breath sounds  He has no wheezes  Musculoskeletal: Normal range of motion  Lymphadenopathy:        Head (right side):  No submental, no submandibular, no tonsillar, no preauricular, no posterior auricular and no occipital adenopathy present  Head (left side): No submental, no submandibular, no tonsillar, no preauricular, no posterior auricular and no occipital adenopathy present  He has no cervical adenopathy  Neurological: He is alert and oriented to person, place, and time  Skin: Skin is warm, dry and intact  Psychiatric: He has a normal mood and affect  His speech is normal and behavior is normal  Judgment and thought content normal  Cognition and memory are normal    Nursing note and vitals reviewed

## 2019-03-01 DIAGNOSIS — F40.243 FEAR OF FLYING: ICD-10-CM

## 2019-03-01 DIAGNOSIS — M54.12 CERVICAL RADICULOPATHY: ICD-10-CM

## 2019-03-01 RX ORDER — OXYCODONE HYDROCHLORIDE 10 MG/1
10 TABLET ORAL EVERY 8 HOURS PRN
Qty: 60 TABLET | Refills: 0 | Status: SHIPPED | OUTPATIENT
Start: 2019-03-01 | End: 2019-04-10 | Stop reason: SDUPTHER

## 2019-03-01 RX ORDER — ALPRAZOLAM 0.5 MG/1
0.5 TABLET ORAL DAILY PRN
Qty: 30 TABLET | Refills: 0 | Status: SHIPPED | OUTPATIENT
Start: 2019-03-01 | End: 2019-04-10 | Stop reason: SDUPTHER

## 2019-04-10 DIAGNOSIS — F40.243 FEAR OF FLYING: ICD-10-CM

## 2019-04-10 DIAGNOSIS — M54.12 CERVICAL RADICULOPATHY: ICD-10-CM

## 2019-04-10 RX ORDER — OXYCODONE HYDROCHLORIDE 10 MG/1
10 TABLET ORAL EVERY 8 HOURS PRN
Qty: 60 TABLET | Refills: 0 | Status: SHIPPED | OUTPATIENT
Start: 2019-04-10 | End: 2019-05-06 | Stop reason: SDUPTHER

## 2019-04-10 RX ORDER — ALPRAZOLAM 0.5 MG/1
0.5 TABLET ORAL DAILY PRN
Qty: 30 TABLET | Refills: 0 | Status: SHIPPED | OUTPATIENT
Start: 2019-04-10 | End: 2019-05-06 | Stop reason: SDUPTHER

## 2019-05-06 DIAGNOSIS — F40.243 FEAR OF FLYING: ICD-10-CM

## 2019-05-06 DIAGNOSIS — M54.12 CERVICAL RADICULOPATHY: ICD-10-CM

## 2019-05-07 RX ORDER — ALPRAZOLAM 0.5 MG/1
0.5 TABLET ORAL DAILY PRN
Qty: 30 TABLET | Refills: 0 | Status: SHIPPED | OUTPATIENT
Start: 2019-05-07 | End: 2019-06-25 | Stop reason: SDUPTHER

## 2019-05-07 RX ORDER — OXYCODONE HYDROCHLORIDE 10 MG/1
10 TABLET ORAL EVERY 8 HOURS PRN
Qty: 60 TABLET | Refills: 0 | Status: SHIPPED | OUTPATIENT
Start: 2019-05-07 | End: 2019-06-25 | Stop reason: SDUPTHER

## 2019-06-18 ENCOUNTER — TELEPHONE (OUTPATIENT)
Dept: FAMILY MEDICINE CLINIC | Facility: CLINIC | Age: 54
End: 2019-06-18

## 2019-06-18 ENCOUNTER — OFFICE VISIT (OUTPATIENT)
Dept: FAMILY MEDICINE CLINIC | Facility: CLINIC | Age: 54
End: 2019-06-18
Payer: COMMERCIAL

## 2019-06-18 ENCOUNTER — HOSPITAL ENCOUNTER (OUTPATIENT)
Dept: NON INVASIVE DIAGNOSTICS | Facility: HOSPITAL | Age: 54
Discharge: HOME | End: 2019-06-18
Payer: COMMERCIAL

## 2019-06-18 VITALS
HEIGHT: 69 IN | HEART RATE: 81 BPM | TEMPERATURE: 97.7 F | OXYGEN SATURATION: 97 % | WEIGHT: 213.4 LBS | BODY MASS INDEX: 31.61 KG/M2 | DIASTOLIC BLOOD PRESSURE: 96 MMHG | RESPIRATION RATE: 17 BRPM | SYSTOLIC BLOOD PRESSURE: 152 MMHG

## 2019-06-18 DIAGNOSIS — M79.89 PAIN AND SWELLING OF LEFT LOWER LEG: ICD-10-CM

## 2019-06-18 DIAGNOSIS — M79.89 PAIN AND SWELLING OF LEFT LOWER LEG: Primary | ICD-10-CM

## 2019-06-18 DIAGNOSIS — M79.662 PAIN AND SWELLING OF LEFT LOWER LEG: Primary | ICD-10-CM

## 2019-06-18 DIAGNOSIS — M79.662 PAIN AND SWELLING OF LEFT LOWER LEG: ICD-10-CM

## 2019-06-18 PROCEDURE — 93971 EXTREMITY STUDY: CPT

## 2019-06-18 PROCEDURE — 99214 OFFICE O/P EST MOD 30 MIN: CPT | Performed by: FAMILY MEDICINE

## 2019-06-19 DIAGNOSIS — M79.89 PAIN AND SWELLING OF LEFT LOWER LEG: Primary | ICD-10-CM

## 2019-06-19 DIAGNOSIS — M79.662 PAIN AND SWELLING OF LEFT LOWER LEG: Primary | ICD-10-CM

## 2019-06-19 PROCEDURE — 93971 EXTREMITY STUDY: CPT | Performed by: SURGERY

## 2019-06-23 ENCOUNTER — HOSPITAL ENCOUNTER (EMERGENCY)
Facility: HOSPITAL | Age: 54
Discharge: HOME | End: 2019-06-23
Attending: EMERGENCY MEDICINE | Admitting: EMERGENCY MEDICINE
Payer: COMMERCIAL

## 2019-06-23 VITALS
DIASTOLIC BLOOD PRESSURE: 91 MMHG | HEART RATE: 70 BPM | TEMPERATURE: 98.5 F | OXYGEN SATURATION: 97 % | RESPIRATION RATE: 18 BRPM | SYSTOLIC BLOOD PRESSURE: 203 MMHG

## 2019-06-23 DIAGNOSIS — M54.12 CERVICAL RADICULOPATHY: ICD-10-CM

## 2019-06-23 DIAGNOSIS — R10.9 ABDOMINAL PAIN: ICD-10-CM

## 2019-06-23 DIAGNOSIS — R11.2 NAUSEA AND VOMITING: Primary | ICD-10-CM

## 2019-06-23 DIAGNOSIS — F40.243 FEAR OF FLYING: ICD-10-CM

## 2019-06-23 LAB
ALBUMIN SERPL BCP-MCNC: 4.9 G/DL (ref 3.5–5)
ALP SERPL-CCNC: 99 U/L (ref 46–116)
ALT SERPL W P-5'-P-CCNC: 31 U/L (ref 12–78)
ANION GAP SERPL CALCULATED.3IONS-SCNC: 15 MMOL/L (ref 4–13)
AST SERPL W P-5'-P-CCNC: 24 U/L (ref 5–45)
BASOPHILS # BLD AUTO: 0.01 THOUSANDS/ΜL (ref 0–0.1)
BASOPHILS NFR BLD AUTO: 0 % (ref 0–1)
BILIRUB SERPL-MCNC: 1.38 MG/DL (ref 0.2–1)
BUN SERPL-MCNC: 12 MG/DL (ref 5–25)
CALCIUM SERPL-MCNC: 10.3 MG/DL (ref 8.3–10.1)
CHLORIDE SERPL-SCNC: 98 MMOL/L (ref 100–108)
CO2 SERPL-SCNC: 24 MMOL/L (ref 21–32)
CREAT SERPL-MCNC: 0.96 MG/DL (ref 0.6–1.3)
EOSINOPHIL # BLD AUTO: 0 THOUSAND/ΜL (ref 0–0.61)
EOSINOPHIL NFR BLD AUTO: 0 % (ref 0–6)
ERYTHROCYTE [DISTWIDTH] IN BLOOD BY AUTOMATED COUNT: 11.9 % (ref 11.6–15.1)
GFR SERPL CREATININE-BSD FRML MDRD: 88 ML/MIN/1.73SQ M
GLUCOSE SERPL-MCNC: 139 MG/DL (ref 65–140)
HCT VFR BLD AUTO: 44.8 % (ref 36.5–49.3)
HGB BLD-MCNC: 15.8 G/DL (ref 12–17)
IMM GRANULOCYTES # BLD AUTO: 0.03 THOUSAND/UL (ref 0–0.2)
IMM GRANULOCYTES NFR BLD AUTO: 0 % (ref 0–2)
LIPASE SERPL-CCNC: 122 U/L (ref 73–393)
LYMPHOCYTES # BLD AUTO: 1.31 THOUSANDS/ΜL (ref 0.6–4.47)
LYMPHOCYTES NFR BLD AUTO: 15 % (ref 14–44)
MCH RBC QN AUTO: 33.3 PG (ref 26.8–34.3)
MCHC RBC AUTO-ENTMCNC: 35.3 G/DL (ref 31.4–37.4)
MCV RBC AUTO: 94 FL (ref 82–98)
MONOCYTES # BLD AUTO: 0.48 THOUSAND/ΜL (ref 0.17–1.22)
MONOCYTES NFR BLD AUTO: 5 % (ref 4–12)
NEUTROPHILS # BLD AUTO: 7 THOUSANDS/ΜL (ref 1.85–7.62)
NEUTS SEG NFR BLD AUTO: 80 % (ref 43–75)
NRBC BLD AUTO-RTO: 0 /100 WBCS
PLATELET # BLD AUTO: 231 THOUSANDS/UL (ref 149–390)
PMV BLD AUTO: 10.3 FL (ref 8.9–12.7)
POTASSIUM SERPL-SCNC: 3.1 MMOL/L (ref 3.5–5.3)
PROT SERPL-MCNC: 9.1 G/DL (ref 6.4–8.2)
RBC # BLD AUTO: 4.75 MILLION/UL (ref 3.88–5.62)
SODIUM SERPL-SCNC: 137 MMOL/L (ref 136–145)
WBC # BLD AUTO: 8.83 THOUSAND/UL (ref 4.31–10.16)

## 2019-06-23 PROCEDURE — 96375 TX/PRO/DX INJ NEW DRUG ADDON: CPT

## 2019-06-23 PROCEDURE — 85025 COMPLETE CBC W/AUTO DIFF WBC: CPT | Performed by: EMERGENCY MEDICINE

## 2019-06-23 PROCEDURE — 96376 TX/PRO/DX INJ SAME DRUG ADON: CPT

## 2019-06-23 PROCEDURE — 99284 EMERGENCY DEPT VISIT MOD MDM: CPT

## 2019-06-23 PROCEDURE — 99285 EMERGENCY DEPT VISIT HI MDM: CPT | Performed by: EMERGENCY MEDICINE

## 2019-06-23 PROCEDURE — 96361 HYDRATE IV INFUSION ADD-ON: CPT

## 2019-06-23 PROCEDURE — 96374 THER/PROPH/DIAG INJ IV PUSH: CPT

## 2019-06-23 PROCEDURE — 36415 COLL VENOUS BLD VENIPUNCTURE: CPT | Performed by: EMERGENCY MEDICINE

## 2019-06-23 PROCEDURE — 80053 COMPREHEN METABOLIC PANEL: CPT | Performed by: EMERGENCY MEDICINE

## 2019-06-23 PROCEDURE — C9113 INJ PANTOPRAZOLE SODIUM, VIA: HCPCS | Performed by: EMERGENCY MEDICINE

## 2019-06-23 PROCEDURE — 83690 ASSAY OF LIPASE: CPT | Performed by: EMERGENCY MEDICINE

## 2019-06-23 RX ORDER — MORPHINE SULFATE 4 MG/ML
4 INJECTION, SOLUTION INTRAMUSCULAR; INTRAVENOUS ONCE
Status: COMPLETED | OUTPATIENT
Start: 2019-06-23 | End: 2019-06-23

## 2019-06-23 RX ORDER — METOCLOPRAMIDE HYDROCHLORIDE 5 MG/ML
10 INJECTION INTRAMUSCULAR; INTRAVENOUS ONCE
Status: COMPLETED | OUTPATIENT
Start: 2019-06-23 | End: 2019-06-23

## 2019-06-23 RX ORDER — PANTOPRAZOLE SODIUM 40 MG/1
40 INJECTION, POWDER, FOR SOLUTION INTRAVENOUS ONCE
Status: COMPLETED | OUTPATIENT
Start: 2019-06-23 | End: 2019-06-23

## 2019-06-23 RX ORDER — ONDANSETRON 2 MG/ML
4 INJECTION INTRAMUSCULAR; INTRAVENOUS ONCE
Status: COMPLETED | OUTPATIENT
Start: 2019-06-23 | End: 2019-06-23

## 2019-06-23 RX ORDER — SUCRALFATE 1 G/1
1 TABLET ORAL 4 TIMES DAILY
Qty: 56 TABLET | Refills: 0 | Status: SHIPPED | OUTPATIENT
Start: 2019-06-23 | End: 2019-08-07

## 2019-06-23 RX ORDER — FAMOTIDINE 20 MG/1
20 TABLET, FILM COATED ORAL 2 TIMES DAILY
Qty: 30 TABLET | Refills: 0 | Status: SHIPPED | OUTPATIENT
Start: 2019-06-23 | End: 2022-03-09

## 2019-06-23 RX ORDER — ONDANSETRON 4 MG/1
4 TABLET, ORALLY DISINTEGRATING ORAL EVERY 8 HOURS PRN
Qty: 12 TABLET | Refills: 0 | Status: SHIPPED | OUTPATIENT
Start: 2019-06-23 | End: 2022-03-09

## 2019-06-23 RX ORDER — METOCLOPRAMIDE 10 MG/1
10 TABLET ORAL EVERY 6 HOURS
Qty: 30 TABLET | Refills: 0 | Status: SHIPPED | OUTPATIENT
Start: 2019-06-23 | End: 2022-03-29

## 2019-06-23 RX ADMIN — PANTOPRAZOLE SODIUM 40 MG: 40 INJECTION, POWDER, FOR SOLUTION INTRAVENOUS at 13:49

## 2019-06-23 RX ADMIN — ONDANSETRON 4 MG: 2 INJECTION INTRAMUSCULAR; INTRAVENOUS at 14:55

## 2019-06-23 RX ADMIN — MORPHINE SULFATE 4 MG: 4 INJECTION INTRAVENOUS at 15:37

## 2019-06-23 RX ADMIN — ONDANSETRON 4 MG: 2 INJECTION INTRAMUSCULAR; INTRAVENOUS at 13:46

## 2019-06-23 RX ADMIN — SODIUM CHLORIDE 1000 ML: 0.9 INJECTION, SOLUTION INTRAVENOUS at 13:46

## 2019-06-23 RX ADMIN — METOCLOPRAMIDE 10 MG: 5 INJECTION, SOLUTION INTRAMUSCULAR; INTRAVENOUS at 15:39

## 2019-06-23 RX ADMIN — MORPHINE SULFATE 4 MG: 4 INJECTION INTRAVENOUS at 15:02

## 2019-06-25 DIAGNOSIS — F40.243 FEAR OF FLYING: ICD-10-CM

## 2019-06-25 DIAGNOSIS — M54.12 CERVICAL RADICULOPATHY: ICD-10-CM

## 2019-06-25 RX ORDER — OXYCODONE HYDROCHLORIDE 10 MG/1
10 TABLET ORAL EVERY 8 HOURS PRN
Qty: 60 TABLET | Refills: 0 | Status: CANCELLED | OUTPATIENT
Start: 2019-06-25

## 2019-06-25 RX ORDER — ALPRAZOLAM 0.5 MG/1
0.5 TABLET ORAL DAILY PRN
Qty: 30 TABLET | Refills: 0 | Status: CANCELLED | OUTPATIENT
Start: 2019-06-25

## 2019-06-25 RX ORDER — OXYCODONE HYDROCHLORIDE 10 MG/1
10 TABLET ORAL EVERY 8 HOURS PRN
Qty: 60 TABLET | Refills: 0 | Status: SHIPPED | OUTPATIENT
Start: 2019-06-25 | End: 2019-08-07

## 2019-06-25 RX ORDER — ALPRAZOLAM 0.5 MG/1
0.5 TABLET ORAL DAILY PRN
Qty: 30 TABLET | Refills: 0 | Status: SHIPPED | OUTPATIENT
Start: 2019-06-25 | End: 2019-08-09 | Stop reason: SDUPTHER

## 2019-07-10 ENCOUNTER — TELEPHONE (OUTPATIENT)
Dept: GASTROENTEROLOGY | Facility: AMBULARY SURGERY CENTER | Age: 54
End: 2019-07-10

## 2019-07-10 NOTE — TELEPHONE ENCOUNTER
NEW patient seen in ED for abd pain and nausea---please assist for sooner appt      Call back #   [phone]

## 2019-07-18 DIAGNOSIS — E03.9 HYPOTHYROIDISM, UNSPECIFIED TYPE: ICD-10-CM

## 2019-07-18 DIAGNOSIS — E78.2 MIXED HYPERLIPIDEMIA: Primary | ICD-10-CM

## 2019-07-18 RX ORDER — LEVOTHYROXINE SODIUM 137 UG/1
137 TABLET ORAL DAILY
Qty: 90 TABLET | Refills: 1 | Status: SHIPPED | OUTPATIENT
Start: 2019-07-18 | End: 2020-01-13 | Stop reason: SDUPTHER

## 2019-08-07 ENCOUNTER — HOSPITAL ENCOUNTER (INPATIENT)
Facility: HOSPITAL | Age: 54
LOS: 2 days | Discharge: HOME | DRG: 305 | End: 2019-08-10
Attending: EMERGENCY MEDICINE | Admitting: INTERNAL MEDICINE
Payer: COMMERCIAL

## 2019-08-07 ENCOUNTER — APPOINTMENT (EMERGENCY)
Dept: RADIOLOGY | Facility: HOSPITAL | Age: 54
DRG: 305 | End: 2019-08-07
Payer: COMMERCIAL

## 2019-08-07 DIAGNOSIS — R11.10 VOMITING: Primary | ICD-10-CM

## 2019-08-07 DIAGNOSIS — R10.13 EPIGASTRIC PAIN: ICD-10-CM

## 2019-08-07 DIAGNOSIS — R11.2 INTRACTABLE NAUSEA AND VOMITING: ICD-10-CM

## 2019-08-07 DIAGNOSIS — K29.70 GASTRITIS: ICD-10-CM

## 2019-08-07 DIAGNOSIS — I16.1 HYPERTENSIVE EMERGENCY: ICD-10-CM

## 2019-08-07 DIAGNOSIS — I16.0 HYPERTENSIVE URGENCY: ICD-10-CM

## 2019-08-07 PROBLEM — G89.29 CHRONIC NECK PAIN: Status: ACTIVE | Noted: 2019-08-07

## 2019-08-07 PROBLEM — D72.829 LEUKOCYTOSIS: Status: ACTIVE | Noted: 2019-08-07

## 2019-08-07 PROBLEM — E87.6 HYPOKALEMIA: Status: ACTIVE | Noted: 2019-08-07

## 2019-08-07 PROBLEM — F41.9 ANXIETY: Status: ACTIVE | Noted: 2019-08-07

## 2019-08-07 PROBLEM — M54.2 CHRONIC NECK PAIN: Status: ACTIVE | Noted: 2019-08-07

## 2019-08-07 LAB
ALBUMIN SERPL BCP-MCNC: 4.3 G/DL (ref 3.5–5)
ALP SERPL-CCNC: 90 U/L (ref 46–116)
ALT SERPL W P-5'-P-CCNC: 22 U/L (ref 12–78)
ANION GAP SERPL CALCULATED.3IONS-SCNC: 17 MMOL/L (ref 4–13)
AST SERPL W P-5'-P-CCNC: 22 U/L (ref 5–45)
BASOPHILS # BLD AUTO: 0.02 THOUSANDS/ΜL (ref 0–0.1)
BASOPHILS NFR BLD AUTO: 0 % (ref 0–1)
BILIRUB SERPL-MCNC: 1.22 MG/DL (ref 0.2–1)
BUN SERPL-MCNC: 12 MG/DL (ref 5–25)
CALCIUM SERPL-MCNC: 9.9 MG/DL (ref 8.3–10.1)
CHLORIDE SERPL-SCNC: 101 MMOL/L (ref 100–108)
CO2 SERPL-SCNC: 22 MMOL/L (ref 21–32)
CREAT SERPL-MCNC: 0.94 MG/DL (ref 0.6–1.3)
EOSINOPHIL # BLD AUTO: 0 THOUSAND/ΜL (ref 0–0.61)
EOSINOPHIL NFR BLD AUTO: 0 % (ref 0–6)
ERYTHROCYTE [DISTWIDTH] IN BLOOD BY AUTOMATED COUNT: 12.4 % (ref 11.6–15.1)
GFR SERPL CREATININE-BSD FRML MDRD: 90 ML/MIN/1.73SQ M
GLUCOSE SERPL-MCNC: 164 MG/DL (ref 65–140)
HCT VFR BLD AUTO: 45.1 % (ref 36.5–49.3)
HGB BLD-MCNC: 15.9 G/DL (ref 12–17)
IMM GRANULOCYTES # BLD AUTO: 0.06 THOUSAND/UL (ref 0–0.2)
IMM GRANULOCYTES NFR BLD AUTO: 0 % (ref 0–2)
LIPASE SERPL-CCNC: 89 U/L (ref 73–393)
LYMPHOCYTES # BLD AUTO: 0.77 THOUSANDS/ΜL (ref 0.6–4.47)
LYMPHOCYTES NFR BLD AUTO: 6 % (ref 14–44)
MCH RBC QN AUTO: 33.4 PG (ref 26.8–34.3)
MCHC RBC AUTO-ENTMCNC: 35.3 G/DL (ref 31.4–37.4)
MCV RBC AUTO: 95 FL (ref 82–98)
MONOCYTES # BLD AUTO: 0.56 THOUSAND/ΜL (ref 0.17–1.22)
MONOCYTES NFR BLD AUTO: 4 % (ref 4–12)
NEUTROPHILS # BLD AUTO: 12.7 THOUSANDS/ΜL (ref 1.85–7.62)
NEUTS SEG NFR BLD AUTO: 90 % (ref 43–75)
NRBC BLD AUTO-RTO: 0 /100 WBCS
PLATELET # BLD AUTO: 250 THOUSANDS/UL (ref 149–390)
PMV BLD AUTO: 10.5 FL (ref 8.9–12.7)
POTASSIUM SERPL-SCNC: 3.3 MMOL/L (ref 3.5–5.3)
PROT SERPL-MCNC: 8.3 G/DL (ref 6.4–8.2)
RBC # BLD AUTO: 4.76 MILLION/UL (ref 3.88–5.62)
SODIUM SERPL-SCNC: 140 MMOL/L (ref 136–145)
WBC # BLD AUTO: 14.11 THOUSAND/UL (ref 4.31–10.16)

## 2019-08-07 PROCEDURE — 83690 ASSAY OF LIPASE: CPT | Performed by: EMERGENCY MEDICINE

## 2019-08-07 PROCEDURE — 99285 EMERGENCY DEPT VISIT HI MDM: CPT | Performed by: EMERGENCY MEDICINE

## 2019-08-07 PROCEDURE — 80053 COMPREHEN METABOLIC PANEL: CPT | Performed by: EMERGENCY MEDICINE

## 2019-08-07 PROCEDURE — 96374 THER/PROPH/DIAG INJ IV PUSH: CPT

## 2019-08-07 PROCEDURE — 85025 COMPLETE CBC W/AUTO DIFF WBC: CPT | Performed by: EMERGENCY MEDICINE

## 2019-08-07 PROCEDURE — 36415 COLL VENOUS BLD VENIPUNCTURE: CPT | Performed by: EMERGENCY MEDICINE

## 2019-08-07 PROCEDURE — 96376 TX/PRO/DX INJ SAME DRUG ADON: CPT

## 2019-08-07 PROCEDURE — 99220 PR INITIAL OBSERVATION CARE/DAY 70 MINUTES: CPT | Performed by: PHYSICIAN ASSISTANT

## 2019-08-07 PROCEDURE — 96372 THER/PROPH/DIAG INJ SC/IM: CPT

## 2019-08-07 PROCEDURE — 96361 HYDRATE IV INFUSION ADD-ON: CPT

## 2019-08-07 PROCEDURE — C9113 INJ PANTOPRAZOLE SODIUM, VIA: HCPCS | Performed by: PHYSICIAN ASSISTANT

## 2019-08-07 PROCEDURE — 71045 X-RAY EXAM CHEST 1 VIEW: CPT

## 2019-08-07 PROCEDURE — 96375 TX/PRO/DX INJ NEW DRUG ADDON: CPT

## 2019-08-07 PROCEDURE — C9113 INJ PANTOPRAZOLE SODIUM, VIA: HCPCS | Performed by: EMERGENCY MEDICINE

## 2019-08-07 PROCEDURE — 99285 EMERGENCY DEPT VISIT HI MDM: CPT

## 2019-08-07 RX ORDER — MORPHINE SULFATE 4 MG/ML
4 INJECTION, SOLUTION INTRAMUSCULAR; INTRAVENOUS ONCE
Status: COMPLETED | OUTPATIENT
Start: 2019-08-07 | End: 2019-08-07

## 2019-08-07 RX ORDER — METOCLOPRAMIDE HYDROCHLORIDE 5 MG/ML
10 INJECTION INTRAMUSCULAR; INTRAVENOUS ONCE
Status: COMPLETED | OUTPATIENT
Start: 2019-08-07 | End: 2019-08-07

## 2019-08-07 RX ORDER — ALPRAZOLAM 0.5 MG/1
0.5 TABLET ORAL DAILY PRN
Status: DISCONTINUED | OUTPATIENT
Start: 2019-08-07 | End: 2019-08-10 | Stop reason: HOSPADM

## 2019-08-07 RX ORDER — PANTOPRAZOLE SODIUM 40 MG/1
40 INJECTION, POWDER, FOR SOLUTION INTRAVENOUS EVERY 12 HOURS SCHEDULED
Status: DISCONTINUED | OUTPATIENT
Start: 2019-08-07 | End: 2019-08-10

## 2019-08-07 RX ORDER — HALOPERIDOL 5 MG/ML
5 INJECTION INTRAMUSCULAR ONCE
Status: COMPLETED | OUTPATIENT
Start: 2019-08-07 | End: 2019-08-07

## 2019-08-07 RX ORDER — MAGNESIUM HYDROXIDE/ALUMINUM HYDROXICE/SIMETHICONE 120; 1200; 1200 MG/30ML; MG/30ML; MG/30ML
30 SUSPENSION ORAL EVERY 6 HOURS PRN
Status: DISCONTINUED | OUTPATIENT
Start: 2019-08-07 | End: 2019-08-10 | Stop reason: HOSPADM

## 2019-08-07 RX ORDER — MAGNESIUM HYDROXIDE/ALUMINUM HYDROXICE/SIMETHICONE 120; 1200; 1200 MG/30ML; MG/30ML; MG/30ML
30 SUSPENSION ORAL ONCE
Status: COMPLETED | OUTPATIENT
Start: 2019-08-07 | End: 2019-08-07

## 2019-08-07 RX ORDER — HYDRALAZINE HYDROCHLORIDE 20 MG/ML
5 INJECTION INTRAMUSCULAR; INTRAVENOUS EVERY 6 HOURS PRN
Status: DISCONTINUED | OUTPATIENT
Start: 2019-08-07 | End: 2019-08-08

## 2019-08-07 RX ORDER — LIDOCAINE HYDROCHLORIDE 20 MG/ML
15 SOLUTION OROPHARYNGEAL ONCE
Status: COMPLETED | OUTPATIENT
Start: 2019-08-07 | End: 2019-08-07

## 2019-08-07 RX ORDER — ONDANSETRON 2 MG/ML
4 INJECTION INTRAMUSCULAR; INTRAVENOUS ONCE
Status: COMPLETED | OUTPATIENT
Start: 2019-08-07 | End: 2019-08-07

## 2019-08-07 RX ORDER — SUCRALFATE ORAL 1 G/10ML
1000 SUSPENSION ORAL EVERY 6 HOURS SCHEDULED
Status: DISCONTINUED | OUTPATIENT
Start: 2019-08-08 | End: 2019-08-10 | Stop reason: HOSPADM

## 2019-08-07 RX ORDER — ACETAMINOPHEN 325 MG/1
650 TABLET ORAL EVERY 6 HOURS PRN
Status: DISCONTINUED | OUTPATIENT
Start: 2019-08-07 | End: 2019-08-10 | Stop reason: HOSPADM

## 2019-08-07 RX ORDER — ONDANSETRON 2 MG/ML
4 INJECTION INTRAMUSCULAR; INTRAVENOUS EVERY 6 HOURS PRN
Status: DISCONTINUED | OUTPATIENT
Start: 2019-08-07 | End: 2019-08-08

## 2019-08-07 RX ORDER — POTASSIUM CHLORIDE 14.9 MG/ML
20 INJECTION INTRAVENOUS ONCE
Status: COMPLETED | OUTPATIENT
Start: 2019-08-07 | End: 2019-08-07

## 2019-08-07 RX ORDER — SUCRALFATE 1 G/1
1 TABLET ORAL ONCE
Status: COMPLETED | OUTPATIENT
Start: 2019-08-07 | End: 2019-08-07

## 2019-08-07 RX ORDER — PANTOPRAZOLE SODIUM 40 MG/1
40 INJECTION, POWDER, FOR SOLUTION INTRAVENOUS ONCE
Status: COMPLETED | OUTPATIENT
Start: 2019-08-07 | End: 2019-08-07

## 2019-08-07 RX ORDER — SODIUM CHLORIDE 9 MG/ML
125 INJECTION, SOLUTION INTRAVENOUS CONTINUOUS
Status: DISCONTINUED | OUTPATIENT
Start: 2019-08-07 | End: 2019-08-08

## 2019-08-07 RX ADMIN — METOCLOPRAMIDE 10 MG: 5 INJECTION, SOLUTION INTRAMUSCULAR; INTRAVENOUS at 12:29

## 2019-08-07 RX ADMIN — ONDANSETRON 4 MG: 2 INJECTION INTRAMUSCULAR; INTRAVENOUS at 14:06

## 2019-08-07 RX ADMIN — ONDANSETRON 4 MG: 2 INJECTION INTRAMUSCULAR; INTRAVENOUS at 11:18

## 2019-08-07 RX ADMIN — SODIUM CHLORIDE 125 ML/HR: 0.9 INJECTION, SOLUTION INTRAVENOUS at 16:30

## 2019-08-07 RX ADMIN — SODIUM CHLORIDE 1000 ML: 0.9 INJECTION, SOLUTION INTRAVENOUS at 11:23

## 2019-08-07 RX ADMIN — POTASSIUM CHLORIDE 20 MEQ: 200 INJECTION, SOLUTION INTRAVENOUS at 16:30

## 2019-08-07 RX ADMIN — MORPHINE SULFATE 4 MG: 4 INJECTION INTRAVENOUS at 12:42

## 2019-08-07 RX ADMIN — SUCRALFATE 1 G: 1 TABLET ORAL at 14:06

## 2019-08-07 RX ADMIN — LIDOCAINE HYDROCHLORIDE 15 ML: 20 SOLUTION ORAL; TOPICAL at 10:58

## 2019-08-07 RX ADMIN — HALOPERIDOL LACTATE 5 MG: 5 INJECTION INTRAMUSCULAR at 14:49

## 2019-08-07 RX ADMIN — PANTOPRAZOLE SODIUM 40 MG: 40 INJECTION, POWDER, FOR SOLUTION INTRAVENOUS at 20:44

## 2019-08-07 RX ADMIN — ALUMINUM HYDROXIDE, MAGNESIUM HYDROXIDE, AND SIMETHICONE 30 ML: 200; 200; 20 SUSPENSION ORAL at 10:58

## 2019-08-07 RX ADMIN — SODIUM CHLORIDE 1000 ML: 0.9 INJECTION, SOLUTION INTRAVENOUS at 12:40

## 2019-08-07 RX ADMIN — PANTOPRAZOLE SODIUM 40 MG: 40 INJECTION, POWDER, FOR SOLUTION INTRAVENOUS at 11:19

## 2019-08-07 NOTE — ASSESSMENT & PLAN NOTE
· Prior hx of HTN previously on medication years ago but had >70 lb weight loss on [employer] and was able to get off antihypertensives, states he has put weight back on and BP normally runs 150/90's   · POA with /93   · In setting of intractable N/V likely contributing with suspected underlying poorly controlled HTN   · Due to intractable vomiting will not start oral antihypertensive at this time as suspect would not tolerate   · Will provide with IV hydralazine prn for SBP >160   · Monitor VS closely

## 2019-08-07 NOTE — ASSESSMENT & PLAN NOTE
· Hx of intractable N/V in the past seen in ED on 6/23/19 but discharged at that time with GI follow up outpatient however earliest appointment was August 13th so has not seen GI yet   · Presented again with acute episode of intractable N/V that started last evening around 6 pm   · No diarrhea   · Epigastric pain started after vomiting/dry heaves   · LFTS WNL other than elevated total bilirubin 1 22, lipase 89   · Had BM this morning that was soft but not watery, no blood present   · Denies NSAID use, quit smoking at age 22   · No previous scopes or known PUD, did not get colonoscopy at age 46, no family hx of colon cancer known   · N/V subsided and feeling very well know after mylanta, lidocaine viscous, reglan, zofran, protonix, carafate and haldol given in ED   · Will consult GI for further eval and recommendations   · Will continue IV protonix 40mg bid, carafate, mylanta prn   · Clear liquid diet as tolerated

## 2019-08-07 NOTE — ED PROVIDER NOTES
History  Chief Complaint   Patient presents with    Vomiting     pt c/o n/v for the past x12 hrs; pt states he has generalized abd  pain from the vomiting; pt denies cp/sob/ or fevers       History provided by:  Patient   used: No    Vomiting   Severity:  Moderate  Duration:  12 hours  Timing:  Intermittent  Quality:  Stomach contents  Feeding tolerance: nothing  Progression:  Unchanged  Chronicity:  Recurrent  Recent urination:  Normal  Context: not post-tussive and not self-induced    Relieved by:  Nothing  Worsened by:  Nothing  Ineffective treatments:  None tried  Associated symptoms: abdominal pain    Associated symptoms: no chills, no cough, no diarrhea, no fever, no headaches and no sore throat    Abdominal pain:     Location:  Epigastric    Quality: cramping      Severity:  Moderate    Onset quality:  Gradual    Duration:  12 hours    Timing:  Constant    Progression:  Waxing and waning    Chronicity:  New  Risk factors comment:  HX if similar symptoms, presumed Gastritis      Prior to Admission Medications   Prescriptions Last Dose Informant Patient Reported? Taking?    ALPRAZolam (XANAX) 0 5 mg tablet   No No   Sig: Take 1 tablet (0 5 mg total) by mouth daily as needed for anxiety   famotidine (PEPCID) 20 mg tablet   No No   Sig: Take 1 tablet (20 mg total) by mouth 2 (two) times a day   levothyroxine 137 mcg tablet   No No   Sig: Take 1 tablet (137 mcg total) by mouth daily   metoclopramide (REGLAN) 10 mg tablet   No No   Sig: Take 1 tablet (10 mg total) by mouth every 6 (six) hours   ondansetron (ZOFRAN-ODT) 4 mg disintegrating tablet   No No   Sig: Take 1 tablet (4 mg total) by mouth every 8 (eight) hours as needed for nausea or vomiting      Facility-Administered Medications: None       Past Medical History:   Diagnosis Date    Abnormal glucose     last assessed-12/5/2013    Acute infective pharyngitis     last assessed-5/19/2013    Disease of thyroid gland     Elevated blood pressure reading without diagnosis of hypertension     last assessed-12/5/2013    Enlarged prostate     last assessed-12/2/2013    Exposure to STD     last assessed-3/30/2016    Hypertension     Injury of blood vessel at forearm level     last assessed-3/30/2013       Past Surgical History:   Procedure Laterality Date    NO PAST SURGERIES         Family History   Problem Relation Age of Onset    No Known Problems Mother     Kidney failure Father      I have reviewed and agree with the history as documented  Social History     Tobacco Use    Smoking status: Never Smoker    Smokeless tobacco: Never Used   Substance Use Topics    Alcohol use: Yes     Comment: socially    Drug use: No        Review of Systems   Constitutional: Negative for chills and fever  HENT: Negative for facial swelling, sore throat and trouble swallowing  Eyes: Negative for pain and visual disturbance  Respiratory: Negative for cough and shortness of breath  Cardiovascular: Negative for chest pain and leg swelling  Gastrointestinal: Positive for abdominal pain and vomiting  Negative for blood in stool, diarrhea and nausea  Genitourinary: Negative for dysuria and flank pain  Musculoskeletal: Negative for back pain, neck pain and neck stiffness  Skin: Negative for pallor and rash  Allergic/Immunologic: Negative for environmental allergies and immunocompromised state  Neurological: Negative for dizziness and headaches  Hematological: Negative for adenopathy  Does not bruise/bleed easily  Psychiatric/Behavioral: Negative for agitation and behavioral problems  All other systems reviewed and are negative  Physical Exam  Physical Exam   Constitutional: He is oriented to person, place, and time  He appears well-developed and well-nourished  No distress  HENT:   Head: Normocephalic and atraumatic  Eyes: EOM are normal    Neck: Normal range of motion  Neck supple     Cardiovascular: Normal rate, regular rhythm, normal heart sounds and intact distal pulses  Pulmonary/Chest: Effort normal and breath sounds normal    Abdominal: Soft  Bowel sounds are normal  There is tenderness (epigastric)  There is no rebound and no guarding  Musculoskeletal: Normal range of motion  Neurological: He is alert and oriented to person, place, and time  Skin: Skin is warm and dry  Psychiatric: He has a normal mood and affect  Nursing note and vitals reviewed        Vital Signs  ED Triage Vitals [08/07/19 1029]   Temperature Pulse Respirations Blood Pressure SpO2   (!) 97 3 °F (36 3 °C) 72 18 (!) 199/91 99 %      Temp Source Heart Rate Source Patient Position - Orthostatic VS BP Location FiO2 (%)   Oral Monitor Sitting Right arm --      Pain Score       8           Vitals:    08/07/19 1243 08/07/19 1447 08/07/19 1533 08/07/19 1540   BP: (!) 190/91 (!) 200/93 135/95 138/81   Pulse: 64 59 88 80   Patient Position - Orthostatic VS: Lying Lying Lying Lying         Visual Acuity      ED Medications  Medications   sodium chloride 0 9 % bolus 1,000 mL (0 mL Intravenous Stopped 8/7/19 1239)   ondansetron (ZOFRAN) injection 4 mg (4 mg Intravenous Given 8/7/19 1118)   pantoprazole (PROTONIX) injection 40 mg (40 mg Intravenous Given 8/7/19 1119)   aluminum-magnesium hydroxide-simethicone (MYLANTA) 200-200-20 mg/5 mL oral suspension 30 mL (30 mL Oral Given 8/7/19 1058)   Lidocaine Viscous HCl (XYLOCAINE) 2 % mucosal solution 15 mL (15 mL Swish & Swallow Given 8/7/19 1058)   metoclopramide (REGLAN) injection 10 mg (10 mg Intravenous Given 8/7/19 1229)   sodium chloride 0 9 % bolus 1,000 mL (0 mL Intravenous Stopped 8/7/19 1411)   morphine (PF) 4 mg/mL injection 4 mg (4 mg Intravenous Given 8/7/19 1242)   ondansetron (ZOFRAN) injection 4 mg (4 mg Intravenous Given 8/7/19 1406)   sucralfate (CARAFATE) tablet 1 g (1 g Oral Given 8/7/19 1406)   haloperidol lactate (HALDOL) injection 5 mg (5 mg Intramuscular Given 8/7/19 7563)       Diagnostic Studies  Results Reviewed     Procedure Component Value Units Date/Time    CBC and differential [[id number]]  (Abnormal) Collected:  08/07/19 1128    Lab Status:  Final result Specimen:  Blood from Arm, Right Updated:  08/07/19 1158     WBC 14 11 Thousand/uL      RBC 4 76 Million/uL      Hemoglobin 15 9 g/dL      Hematocrit 45 1 %      MCV 95 fL      MCH 33 4 pg      MCHC 35 3 g/dL      RDW 12 4 %      MPV 10 5 fL      Platelets 393 Thousands/uL      nRBC 0 /100 WBCs      Neutrophils Relative 90 %      Immat GRANS % 0 %      Lymphocytes Relative 6 %      Monocytes Relative 4 %      Eosinophils Relative 0 %      Basophils Relative 0 %      Neutrophils Absolute 12 70 Thousands/µL      Immature Grans Absolute 0 06 Thousand/uL      Lymphocytes Absolute 0 77 Thousands/µL      Monocytes Absolute 0 56 Thousand/µL      Eosinophils Absolute 0 00 Thousand/µL      Basophils Absolute 0 02 Thousands/µL     Narrative: This is an appended report  These results have been appended to a previously verified report      Comprehensive metabolic panel [[id number]]  (Abnormal) Collected:  08/07/19 1128    Lab Status:  Final result Specimen:  Blood from Arm, Right Updated:  08/07/19 1150     Sodium 140 mmol/L      Potassium 3 3 mmol/L      Chloride 101 mmol/L      CO2 22 mmol/L      ANION GAP 17 mmol/L      BUN 12 mg/dL      Creatinine 0 94 mg/dL      Glucose 164 mg/dL      Calcium 9 9 mg/dL      AST 22 U/L      ALT 22 U/L      Alkaline Phosphatase 90 U/L      Total Protein 8 3 g/dL      Albumin 4 3 g/dL      Total Bilirubin 1 22 mg/dL      eGFR 90 ml/min/1 73sq m     Narrative:       [facility] guidelines for Chronic Kidney Disease (CKD):     Stage 1 with normal or high GFR (GFR > 90 mL/min/1 73 square meters)    Stage 2 Mild CKD (GFR = 60-89 mL/min/1 73 square meters)    Stage 3A Moderate CKD (GFR = 45-59 mL/min/1 73 square meters)    Stage 3B Moderate CKD (GFR = 30-44 mL/min/1 73 square meters)    Stage 4 Severe CKD (GFR = 15-29 mL/min/1 73 square meters)    Stage 5 End Stage CKD (GFR <15 mL/min/1 73 square meters)  Note: GFR calculation is accurate only with a steady state creatinine    Lipase [[id number]]  (Normal) Collected:  08/07/19 1128    Lab Status:  Final result Specimen:  Blood from Arm, Right Updated:  08/07/19 1150     Lipase 89 u/L                  XR chest 1 view portable   Final Result by Kathy Montano MD (08/07 1540)      No acute cardiopulmonary disease  Workstation performed: [id number]                    Procedures  Procedures       ED Course  ED Course as of Aug 07 1554   Wed Aug 07, 2019   1202 WBC(!): 14 11   1203 Sodium: 140   1203 Potassium(!): 3 3   1203 Anion Gap(!): 17   1203 AST: 22   1203 ALT: 22   1203 Labs reviewed, mild leucocytosis and low K noted, from N/V  Lipase: 89   1204 Ekg does not show acute abnormality  455 [id number] Patient re-evaluated, still nauseous, vomited again, informed about the lab results, normal lipase, liver enzymes, mild elevation of white blood cells from vomiting  Patient request pain meds for abdominal pain, takes oxycodone for chronic neck pain, will give 1 dose of morphine here  Discussed about possible CT imaging, patient states that he had CT scan last time for similar symptoms and that did not show anything acute, pain is still in epigastrium, no lower quadrant pain or tenderness  1344 Patient re-evaluated at bedside, still has mild nausea, dry heaving, wants to try ice chips, okay with repeating Zofran, will give carafate, will re-evaluate if feeling better, possible discharge home  1419 Patient continues to be nauseous, we will admit for intractable nausea/vomiting                                    MDM  Number of Diagnoses or Management Options  Epigastric pain: new and requires workup  Gastritis:   Intractable nausea and vomiting: new and requires workup  Vomiting: new and requires workup  Diagnosis management comments: Patient is a 57-year-old male, comes in with recurrent symptoms of nausea, vomiting, epigastric pain, patient was recently seen in the ER for similar symptoms, is scheduled to see gastroenterologist according to the patient, has appointment coming Tuesday; denies chest pain, fever, diarrhea, blood in vomiting  On exam patient appears uncomfortable due to pain, hypertension noted, lungs clear, heart sounds normal, abdomen soft, nondistended, bowel sounds present, tenderness noted in epigastrium, negative Romero sign, no lower quadrant tenderness  Impression:  Gastritis, GERD, GI viral illness, pancreatitis, will check labs including CBC, CMP, lipase, give IV fluids, Protonix, Zofran, GI cocktail, re-evaluate  Amount and/or Complexity of Data Reviewed  Clinical lab tests: ordered and reviewed  Tests in the radiology section of CPT®: ordered and reviewed  Tests in the medicine section of CPT®: reviewed and ordered  Review and summarize past medical records: yes  Discuss the patient with other providers: yes  Independent visualization of images, tracings, or specimens: yes        Disposition  Final diagnoses:   Vomiting   Epigastric pain   Gastritis   Intractable nausea and vomiting     Time reflects when diagnosis was documented in both MDM as applicable and the Disposition within this note     Time User Action Codes Description Comment    8/7/2019 10:49 AM Raymond Bolk Add [R11 10] Vomiting     8/7/2019 10:49 AM Raymond Bolk Add [R10 13] Epigastric pain     8/7/2019 10:49 AM Raymond Bolk Add [K29 70] Gastritis     8/7/2019  3:09 PM Raymond Bolk Add [R11 2] Intractable nausea and vomiting       ED Disposition     ED Disposition Condition Date/Time Comment    Admit Stable Wed Aug 7, 2019  3:09 PM Case was discussed with Dr Brielle Larsen and the patient's admission status was agreed to be Admission Status: observation status to the service of Dr Brielle Larsen          Follow-up Information    None         Current Discharge Medication List      CONTINUE these medications which have NOT CHANGED    Details   ALPRAZolam (XANAX) 0 5 mg tablet Take 1 tablet (0 5 mg total) by mouth daily as needed for anxiety  Qty: 30 tablet, Refills: 0    Associated Diagnoses: Fear of flying      famotidine (PEPCID) 20 mg tablet Take 1 tablet (20 mg total) by mouth 2 (two) times a day  Qty: 30 tablet, Refills: 0    Associated Diagnoses: Nausea and vomiting      levothyroxine 137 mcg tablet Take 1 tablet (137 mcg total) by mouth daily  Qty: 90 tablet, Refills: 1    Associated Diagnoses: Hypothyroidism, unspecified type      metoclopramide (REGLAN) 10 mg tablet Take 1 tablet (10 mg total) by mouth every 6 (six) hours  Qty: 30 tablet, Refills: 0    Associated Diagnoses: Nausea and vomiting      ondansetron (ZOFRAN-ODT) 4 mg disintegrating tablet Take 1 tablet (4 mg total) by mouth every 8 (eight) hours as needed for nausea or vomiting  Qty: 12 tablet, Refills: 0    Associated Diagnoses: Nausea and vomiting           No discharge procedures on file      ED Provider  Electronically Signed by           Thong Contreras MD  08/07/19 7272

## 2019-08-07 NOTE — H&P
H&P- Sherrilyn Collet 1965, 62 y o  male MRN: [id number]  Unit/Bed#: MELE Encounter: [id number]  Primary Care Provider: Cari Salazar DO   Date and time admitted to hospital: 8/7/2019 10:31 AM    * Intractable nausea and vomiting  Assessment & Plan  · Hx of intractable N/V in the past seen in ED on 6/23/19 but discharged at that time with GI follow up outpatient however earliest appointment was August 13th so has not seen GI yet   · Presented again with acute episode of intractable N/V that started last evening around 6 pm   · No diarrhea   · Epigastric pain started after vomiting/dry heaves   · LFTS WNL other than elevated total bilirubin 1 22, lipase 89   · Had BM this morning that was soft but not watery, no blood present   · Denies NSAID use, quit smoking at age 22   · No previous scopes or known PUD, did not get colonoscopy at age 46, no family hx of colon cancer known   · N/V subsided and feeling very well know after mylanta, lidocaine viscous, reglan, zofran, protonix, carafate and haldol given in ED   · Will consult GI for further eval and recommendations   · Will continue IV protonix 40mg bid, carafate, mylanta prn   · Clear liquid diet as tolerated     Hypertensive urgency  Assessment & Plan  · Prior hx of HTN previously on medication years ago but had >70 lb weight loss on [employer] and was able to get off antihypertensives, states he has put weight back on and BP normally runs 150/90's   · POA with /93   · In setting of intractable N/V likely contributing with suspected underlying poorly controlled HTN   · Due to intractable vomiting will not start oral antihypertensive at this time as suspect would not tolerate   · Will provide with IV hydralazine prn for SBP >160   · Monitor VS closely     Hypokalemia  Assessment & Plan  · secondary to ongoing IG losses with N/V, Replete, check K+ and Mag in AM     Anxiety  Assessment & Plan  · PDMP reviewed on Xanax 0 5 mg prn last filled 6/25/19 qty 30 Chronic neck pain  Assessment & Plan  · Chronic neck pain, on oxycodone 10mg prn - PDMP reviewed filled 6/25/19 qty 60   · Will hold oxycodone in setting of N/V     Hypothyroidism  Assessment & Plan  · Continue levothyroxine     Leukocytosis  Assessment & Plan  · POA with WBC 14 11, suspect this is in setting of intractable N/V  · Continue to monitor       VTE Prophylaxis: per protocol   / sequential compression device   Code Status: level 1 full code   POLST: There is no POLST form on file for this patient (pre-hospital)  Discussion with family: patient     Anticipated Length of Stay:  Patient will be admitted on an Observation basis with an anticipated length of stay of  < 2 midnights  Justification for Hospital Stay: intractable N/V    Total Time for Visit, including Counseling / Coordination of Care: 45 minutes  Greater than 50% of this total time spent on direct patient counseling and coordination of care  Chief Complaint:   Intractable nausea and vomiting     History of Present Illness:    William Oneill is a 62 y o  male with past medical history of HTN in the past, anxiety, chronic neck pain, hypothyroidism who presents with intractable nausea and vomiting that started last night at 6pm  Patient was previously seen for similar symptoms on 6/23/19 and was discharged home from the ED at that time after symptoms resolved with supportive care and was told to follow up with GI  Patient has not yet seen GI as the earliest appointment available was August 13th  He was in normal state of health until acute episode of nausea and vomiting last evening  He states he ate normal breakfast and then did not eat the rest of the day and the at 6pm had one bite of Sinhala gonsalves and started vomiting  He states when he gets hungry he does have sensation of nausea  He has been unable to get anything down since then with persistent dry heaves  He denies any diarrhea  He denies any rrecent change in foods, undercooked foods  He denies history of PUD or ulcers in past  Did not get colonoscopy at age 46  No family hx of cancers noted  In the ED he was given mylanta, lidocaine viscous, reglan, zofran, protonix, and carafate and eventually haldol with resolution of symptoms  He currently feels much better withotu N/V  He has epigastric tenderness that started after vomiting and dry heaving  Denies NSAID use  Quit smoking tobacco at age 22  Of note, on admission his /93  He was previously treated for HTN, can not recall which med he was on but then went on [employer] and lost >70 lbs and was able to get off BP med  He states he gained weight over past couple of years and BP runs 150/90 but is not on any BP meds  Review of Systems:    Review of Systems   Constitutional: Negative for chills, diaphoresis and fever  Respiratory: Negative for cough and shortness of breath  Cardiovascular: Negative for chest pain and palpitations  Gastrointestinal: Positive for abdominal pain (epigastric), nausea and vomiting  Negative for constipation and diarrhea  Genitourinary: Negative for difficulty urinating  Musculoskeletal: Positive for arthralgias (chronic neck pain )  Neurological: Negative for dizziness and light-headedness  All other systems reviewed and are negative        Past Medical and Surgical History:     Past Medical History:   Diagnosis Date    Abnormal glucose     last assessed-12/5/2013    Acute infective pharyngitis     last assessed-5/19/2013    Disease of thyroid gland     Elevated blood pressure reading without diagnosis of hypertension     last assessed-12/5/2013    Enlarged prostate     last assessed-12/2/2013    Exposure to STD     last assessed-3/30/2016    Hypertension     Injury of blood vessel at forearm level     last assessed-3/30/2013       Past Surgical History:   Procedure Laterality Date    NO PAST SURGERIES         Meds/Allergies:    Prior to Admission medications    Medication Sig Start Date End Date Taking? Authorizing Provider   ALPRAZolam Iris Sherry) 0 5 mg tablet Take 1 tablet (0 5 mg total) by mouth daily as needed for anxiety 6/25/19   Mark Maxwell DO   famotidine (PEPCID) 20 mg tablet Take 1 tablet (20 mg total) by mouth 2 (two) times a day 6/23/19   Shey Major DO   levothyroxine 137 mcg tablet Take 1 tablet (137 mcg total) by mouth daily 7/18/19   Mark Mawxell DO   metoclopramide (REGLAN) 10 mg tablet Take 1 tablet (10 mg total) by mouth every 6 (six) hours 6/23/19   Shey Major DO   ondansetron (ZOFRAN-ODT) 4 mg disintegrating tablet Take 1 tablet (4 mg total) by mouth every 8 (eight) hours as needed for nausea or vomiting 6/23/19   Shey Major DO   oxyCODONE (ROXICODONE) 10 MG TABS Take 1 tablet (10 mg total) by mouth every 8 (eight) hours as needed for severe painMax Daily Amount: 30 mg 6/25/19 8/7/19  Mark Maxwell DO   sucralfate (CARAFATE) 1 g tablet Take 1 tablet (1 g total) by mouth 4 (four) times a day for 14 days 6/23/19 8/7/19  Shey Major DO     I have reviewed home medications with patient personally      Allergies: No Known Allergies    Social History:     Marital Status: Significant Other   Occupation:   Patient Pre-hospital Living Situation:   Patient Pre-hospital Level of Mobility: without assistance   Patient Pre-hospital Diet Restrictions: regular   Substance Use History:   Social History     Substance and Sexual Activity   Alcohol Use Yes    Comment: socially     Social History     Tobacco Use   Smoking Status Never Smoker   Smokeless Tobacco Never Used     Social History     Substance and Sexual Activity   Drug Use No       Family History:    Family History   Problem Relation Age of Onset    No Known Problems Mother     Kidney failure Father        Physical Exam:     Vitals:   Blood Pressure: 138/81 (08/07/19 1540)  Pulse: 80 (08/07/19 1540)  Temperature: (!) 97 3 °F (36 3 °C) (08/07/19 1029)  Temp Source: Oral (08/07/19 1029)  Respirations: 18 (08/07/19 1540)  Weight - Scale: 91 5 kg (201 lb 11 5 oz) (08/07/19 1029)  SpO2: 97 % (08/07/19 1540)    Physical Exam   Constitutional: No distress  Resting in bed INAD, talkative    HENT:   Head: Normocephalic  Eyes: Conjunctivae are normal    Neck: Neck supple  Cardiovascular: Normal rate and regular rhythm  Pulmonary/Chest: Effort normal and breath sounds normal    Abdominal: Soft  Bowel sounds are normal  There is no tenderness (no tenderness note don deep palpation but states he has epigastric pain)  There is no rebound and no guarding  Musculoskeletal: He exhibits no edema  Neurological: He is alert  Skin: Skin is warm  He is not diaphoretic  Psychiatric: He has a normal mood and affect  Nursing note and vitals reviewed  Additional Data:     Lab Results: I have personally reviewed pertinent reports  Results from last 7 days   Lab Units 08/07/19  1128   WBC Thousand/uL 14 11*   HEMOGLOBIN g/dL 15 9   HEMATOCRIT % 45 1   PLATELETS Thousands/uL 250   NEUTROS PCT % 90*   LYMPHS PCT % 6*   MONOS PCT % 4   EOS PCT % 0     Results from last 7 days   Lab Units 08/07/19  1128   SODIUM mmol/L 140   POTASSIUM mmol/L 3 3*   CHLORIDE mmol/L 101   CO2 mmol/L 22   BUN mg/dL 12   CREATININE mg/dL 0 94   ANION GAP mmol/L 17*   CALCIUM mg/dL 9 9   ALBUMIN g/dL 4 3   TOTAL BILIRUBIN mg/dL 1 22*   ALK PHOS U/L 90   ALT U/L 22   AST U/L 22   GLUCOSE RANDOM mg/dL 164*                       Imaging: I have personally reviewed pertinent reports  XR chest 1 view portable   Final Result by Parminder Pelaez MD (08/07 1540)      No acute cardiopulmonary disease  Workstation performed: [id number]             EKG, Pathology, and Other Studies Reviewed on Admission:   · EKG: poor quality due to artifact, rate 75 NSR     Allscripts / Epic Records Reviewed: Yes     ** Please Note: This note has been constructed using a voice recognition system   **

## 2019-08-07 NOTE — ASSESSMENT & PLAN NOTE
· Chronic neck pain, on oxycodone 10mg prn - PDMP reviewed filled 6/25/19 qty 60   · Will hold oxycodone in setting of N/V

## 2019-08-07 NOTE — PLAN OF CARE
Problem: Potential for Falls  Goal: Patient will remain free of falls  Description  INTERVENTIONS:  - Assess patient frequently for physical needs  -  Identify cognitive and physical deficits and behaviors that affect risk of falls  -  [place] fall precautions as indicated by assessment   - Educate patient/family on patient safety including physical limitations  - Instruct patient to call for assistance with activity based on assessment  - Modify environment to reduce risk of injury  - Consider OT/PT consult to assist with strengthening/mobility  Outcome: Progressing     Problem: CARDIOVASCULAR - ADULT  Goal: Maintains optimal cardiac output and hemodynamic stability  Description  INTERVENTIONS:  - Monitor I/O, vital signs and rhythm  - Monitor for S/S and trends of decreased cardiac output i e  bleeding, hypotension  - Administer and titrate ordered vasoactive medications to optimize hemodynamic stability  - Assess quality of pulses, skin color and temperature  - Assess for signs of decreased coronary artery perfusion - ex   Angina  - Instruct patient to report change in severity of symptoms  Outcome: Progressing  Goal: Absence of cardiac dysrhythmias or at baseline rhythm  Description  INTERVENTIONS:  - Continuous cardiac monitoring, monitor vital signs, obtain 12 lead EKG if indicated  - Administer antiarrhythmic and heart rate control medications as ordered  - Monitor electrolytes and administer replacement therapy as ordered  Outcome: Progressing     Problem: RESPIRATORY - ADULT  Goal: Achieves optimal ventilation and oxygenation  Description  INTERVENTIONS:  - Assess for changes in respiratory status  - Assess for changes in mentation and behavior  - Position to facilitate oxygenation and minimize respiratory effort  - Oxygen administration by appropriate delivery method based on oxygen saturation (per order) or ABGs  - Initiate smoking cessation education as indicated  - Encourage broncho-pulmonary hygiene including cough, deep breathe, Incentive Spirometry  - Assess the need for suctioning and aspirate as needed  - Assess and instruct to report SOB or any respiratory difficulty  - Respiratory Therapy support as indicated  Outcome: Progressing     Problem: GASTROINTESTINAL - ADULT  Goal: Minimal or absence of nausea and/or vomiting  Description  INTERVENTIONS:  - Administer IV fluids as ordered to ensure adequate hydration  - Maintain NPO status until nausea and vomiting are resolved  - Nasogastric tube as ordered  - Administer ordered antiemetic medications as needed  - Provide nonpharmacologic comfort measures as appropriate  - Advance diet as tolerated, if ordered  - Nutrition services referral to assist patient with adequate nutrition and appropriate food choices  Outcome: Progressing  Goal: Maintains or returns to baseline bowel function  Description  INTERVENTIONS:  - Assess bowel function  - Encourage oral fluids to ensure adequate hydration  - Administer IV fluids as ordered to ensure adequate hydration  - Administer ordered medications as needed  - Encourage mobilization and activity  - Nutrition services referral to assist patient with appropriate food choices  Outcome: Progressing  Goal: Maintains adequate nutritional intake  Description  INTERVENTIONS:  - Monitor percentage of each meal consumed  - Identify factors contributing to decreased intake, treat as appropriate  - Assist with meals as needed  - Monitor I&O, WT and lab values  - Obtain nutrition services referral as needed  Outcome: Progressing     Problem: METABOLIC, FLUID AND ELECTROLYTES - ADULT  Goal: Electrolytes maintained within normal limits  Description  INTERVENTIONS:  - Monitor labs and assess patient for signs and symptoms of electrolyte imbalances  - Administer electrolyte replacement as ordered  - Monitor response to electrolyte replacements, including repeat lab results as appropriate  - Instruct patient on fluid and nutrition as appropriate  Outcome: Progressing  Goal: Fluid balance maintained  Description  INTERVENTIONS:  - Monitor labs and assess for signs and symptoms of volume excess or deficit  - Monitor I/O and WT  - Instruct patient on fluid and nutrition as appropriate  Outcome: Progressing     Problem: HEMATOLOGIC - ADULT  Goal: Maintains hematologic stability  Description  INTERVENTIONS  - Assess for signs and symptoms of bleeding or hemorrhage  - Monitor labs  - Administer supportive blood products/factors as ordered and appropriate  Outcome: Progressing

## 2019-08-08 ENCOUNTER — APPOINTMENT (OUTPATIENT)
Dept: ULTRASOUND IMAGING | Facility: HOSPITAL | Age: 54
DRG: 305 | End: 2019-08-08
Payer: COMMERCIAL

## 2019-08-08 ENCOUNTER — APPOINTMENT (INPATIENT)
Dept: CT IMAGING | Facility: HOSPITAL | Age: 54
DRG: 305 | End: 2019-08-08
Payer: COMMERCIAL

## 2019-08-08 ENCOUNTER — APPOINTMENT (OUTPATIENT)
Dept: GASTROENTEROLOGY | Facility: HOSPITAL | Age: 54
DRG: 305 | End: 2019-08-08
Payer: COMMERCIAL

## 2019-08-08 ENCOUNTER — ANESTHESIA (OUTPATIENT)
Dept: GASTROENTEROLOGY | Facility: HOSPITAL | Age: 54
DRG: 305 | End: 2019-08-08
Payer: COMMERCIAL

## 2019-08-08 ENCOUNTER — ANESTHESIA EVENT (OUTPATIENT)
Dept: GASTROENTEROLOGY | Facility: HOSPITAL | Age: 54
DRG: 305 | End: 2019-08-08
Payer: COMMERCIAL

## 2019-08-08 PROBLEM — D72.829 LEUKOCYTOSIS: Status: RESOLVED | Noted: 2019-08-07 | Resolved: 2019-08-08

## 2019-08-08 PROBLEM — F10.10 ALCOHOL ABUSE: Status: ACTIVE | Noted: 2019-08-08

## 2019-08-08 LAB
ALBUMIN SERPL BCP-MCNC: 3.3 G/DL (ref 3.5–5)
ALP SERPL-CCNC: 68 U/L (ref 46–116)
ALT SERPL W P-5'-P-CCNC: 20 U/L (ref 12–78)
AMPHETAMINES SERPL QL SCN: NEGATIVE
ANION GAP SERPL CALCULATED.3IONS-SCNC: 10 MMOL/L (ref 4–13)
AST SERPL W P-5'-P-CCNC: 21 U/L (ref 5–45)
BARBITURATES UR QL: NEGATIVE
BASOPHILS # BLD AUTO: 0.02 THOUSANDS/ΜL (ref 0–0.1)
BASOPHILS NFR BLD AUTO: 0 % (ref 0–1)
BENZODIAZ UR QL: NEGATIVE
BILIRUB DIRECT SERPL-MCNC: 0.23 MG/DL (ref 0–0.2)
BILIRUB SERPL-MCNC: 1.03 MG/DL (ref 0.2–1)
BUN SERPL-MCNC: 8 MG/DL (ref 5–25)
CALCIUM SERPL-MCNC: 8.6 MG/DL (ref 8.3–10.1)
CHLORIDE SERPL-SCNC: 107 MMOL/L (ref 100–108)
CO2 SERPL-SCNC: 26 MMOL/L (ref 21–32)
COCAINE UR QL: NEGATIVE
CORTIS SERPL-MCNC: 41.8 UG/DL
CREAT SERPL-MCNC: 0.74 MG/DL (ref 0.6–1.3)
EOSINOPHIL # BLD AUTO: 0.02 THOUSAND/ΜL (ref 0–0.61)
EOSINOPHIL NFR BLD AUTO: 0 % (ref 0–6)
ERYTHROCYTE [DISTWIDTH] IN BLOOD BY AUTOMATED COUNT: 12.7 % (ref 11.6–15.1)
GFR SERPL CREATININE-BSD FRML MDRD: 103 ML/MIN/1.73SQ M
GLUCOSE SERPL-MCNC: 108 MG/DL (ref 65–140)
HCT VFR BLD AUTO: 40.5 % (ref 36.5–49.3)
HGB BLD-MCNC: 13.8 G/DL (ref 12–17)
IMM GRANULOCYTES # BLD AUTO: 0.02 THOUSAND/UL (ref 0–0.2)
IMM GRANULOCYTES NFR BLD AUTO: 0 % (ref 0–2)
LYMPHOCYTES # BLD AUTO: 2.08 THOUSANDS/ΜL (ref 0.6–4.47)
LYMPHOCYTES NFR BLD AUTO: 26 % (ref 14–44)
MAGNESIUM SERPL-MCNC: 2 MG/DL (ref 1.6–2.6)
MCH RBC QN AUTO: 33.6 PG (ref 26.8–34.3)
MCHC RBC AUTO-ENTMCNC: 34.1 G/DL (ref 31.4–37.4)
MCV RBC AUTO: 99 FL (ref 82–98)
METHADONE UR QL: NEGATIVE
MONOCYTES # BLD AUTO: 0.57 THOUSAND/ΜL (ref 0.17–1.22)
MONOCYTES NFR BLD AUTO: 7 % (ref 4–12)
NEUTROPHILS # BLD AUTO: 5.2 THOUSANDS/ΜL (ref 1.85–7.62)
NEUTS SEG NFR BLD AUTO: 67 % (ref 43–75)
NRBC BLD AUTO-RTO: 0 /100 WBCS
OPIATES UR QL SCN: NEGATIVE
OSMOLALITY UR: 430 MMOL/KG
PCP UR QL: NEGATIVE
PLATELET # BLD AUTO: 199 THOUSANDS/UL (ref 149–390)
PMV BLD AUTO: 10.7 FL (ref 8.9–12.7)
POTASSIUM SERPL-SCNC: 3.4 MMOL/L (ref 3.5–5.3)
POTASSIUM UR-SCNC: 10.4 MMOL/L
PROT SERPL-MCNC: 6.7 G/DL (ref 6.4–8.2)
RBC # BLD AUTO: 4.11 MILLION/UL (ref 3.88–5.62)
SODIUM 24H UR-SCNC: 195 MOL/L
SODIUM SERPL-SCNC: 143 MMOL/L (ref 136–145)
THC UR QL: POSITIVE
TSH SERPL DL<=0.05 MIU/L-ACNC: 3.44 UIU/ML (ref 0.36–3.74)
WBC # BLD AUTO: 7.91 THOUSAND/UL (ref 4.31–10.16)

## 2019-08-08 PROCEDURE — 74177 CT ABD & PELVIS W/CONTRAST: CPT

## 2019-08-08 PROCEDURE — 99254 IP/OBS CNSLTJ NEW/EST MOD 60: CPT | Performed by: INTERNAL MEDICINE

## 2019-08-08 PROCEDURE — 80053 COMPREHEN METABOLIC PANEL: CPT | Performed by: PHYSICIAN ASSISTANT

## 2019-08-08 PROCEDURE — 70450 CT HEAD/BRAIN W/O DYE: CPT

## 2019-08-08 PROCEDURE — 82088 ASSAY OF ALDOSTERONE: CPT | Performed by: INTERNAL MEDICINE

## 2019-08-08 PROCEDURE — 84443 ASSAY THYROID STIM HORMONE: CPT | Performed by: NURSE PRACTITIONER

## 2019-08-08 PROCEDURE — 0DB78ZX EXCISION OF STOMACH, PYLORUS, VIA NATURAL OR ARTIFICIAL OPENING ENDOSCOPIC, DIAGNOSTIC: ICD-10-PCS | Performed by: INTERNAL MEDICINE

## 2019-08-08 PROCEDURE — 0DB18ZX EXCISION OF UPPER ESOPHAGUS, VIA NATURAL OR ARTIFICIAL OPENING ENDOSCOPIC, DIAGNOSTIC: ICD-10-PCS | Performed by: INTERNAL MEDICINE

## 2019-08-08 PROCEDURE — 83935 ASSAY OF URINE OSMOLALITY: CPT | Performed by: NURSE PRACTITIONER

## 2019-08-08 PROCEDURE — 82533 TOTAL CORTISOL: CPT | Performed by: INTERNAL MEDICINE

## 2019-08-08 PROCEDURE — 84133 ASSAY OF URINE POTASSIUM: CPT | Performed by: NURSE PRACTITIONER

## 2019-08-08 PROCEDURE — 0DB98ZX EXCISION OF DUODENUM, VIA NATURAL OR ARTIFICIAL OPENING ENDOSCOPIC, DIAGNOSTIC: ICD-10-PCS | Performed by: INTERNAL MEDICINE

## 2019-08-08 PROCEDURE — 43239 EGD BIOPSY SINGLE/MULTIPLE: CPT | Performed by: INTERNAL MEDICINE

## 2019-08-08 PROCEDURE — 80307 DRUG TEST PRSMV CHEM ANLYZR: CPT | Performed by: NURSE PRACTITIONER

## 2019-08-08 PROCEDURE — 82248 BILIRUBIN DIRECT: CPT | Performed by: PHYSICIAN ASSISTANT

## 2019-08-08 PROCEDURE — 83735 ASSAY OF MAGNESIUM: CPT | Performed by: PHYSICIAN ASSISTANT

## 2019-08-08 PROCEDURE — C9113 INJ PANTOPRAZOLE SODIUM, VIA: HCPCS | Performed by: PHYSICIAN ASSISTANT

## 2019-08-08 PROCEDURE — C9113 INJ PANTOPRAZOLE SODIUM, VIA: HCPCS | Performed by: INTERNAL MEDICINE

## 2019-08-08 PROCEDURE — 99291 CRITICAL CARE FIRST HOUR: CPT | Performed by: INTERNAL MEDICINE

## 2019-08-08 PROCEDURE — 88305 TISSUE EXAM BY PATHOLOGIST: CPT | Performed by: PATHOLOGY

## 2019-08-08 PROCEDURE — 84300 ASSAY OF URINE SODIUM: CPT | Performed by: NURSE PRACTITIONER

## 2019-08-08 PROCEDURE — 76705 ECHO EXAM OF ABDOMEN: CPT

## 2019-08-08 PROCEDURE — 85025 COMPLETE CBC W/AUTO DIFF WBC: CPT | Performed by: PHYSICIAN ASSISTANT

## 2019-08-08 PROCEDURE — 99233 SBSQ HOSP IP/OBS HIGH 50: CPT | Performed by: INTERNAL MEDICINE

## 2019-08-08 RX ORDER — HALOPERIDOL 5 MG/ML
5 INJECTION INTRAMUSCULAR ONCE
Status: COMPLETED | OUTPATIENT
Start: 2019-08-08 | End: 2019-08-08

## 2019-08-08 RX ORDER — HYDROMORPHONE HCL/PF 1 MG/ML
1 SYRINGE (ML) INJECTION ONCE
Status: COMPLETED | OUTPATIENT
Start: 2019-08-08 | End: 2019-08-08

## 2019-08-08 RX ORDER — FOLIC ACID 1 MG/1
1 TABLET ORAL DAILY
Status: DISCONTINUED | OUTPATIENT
Start: 2019-08-09 | End: 2019-08-10 | Stop reason: HOSPADM

## 2019-08-08 RX ORDER — PROPOFOL 10 MG/ML
INJECTION, EMULSION INTRAVENOUS AS NEEDED
Status: DISCONTINUED | OUTPATIENT
Start: 2019-08-08 | End: 2019-08-08 | Stop reason: SURG

## 2019-08-08 RX ORDER — POTASSIUM CHLORIDE 20 MEQ/1
40 TABLET, EXTENDED RELEASE ORAL ONCE
Status: COMPLETED | OUTPATIENT
Start: 2019-08-08 | End: 2019-08-08

## 2019-08-08 RX ORDER — THIAMINE MONONITRATE (VIT B1) 100 MG
100 TABLET ORAL DAILY
Status: DISCONTINUED | OUTPATIENT
Start: 2019-08-09 | End: 2019-08-10 | Stop reason: HOSPADM

## 2019-08-08 RX ORDER — OXYCODONE HYDROCHLORIDE AND ACETAMINOPHEN 5; 325 MG/1; MG/1
1 TABLET ORAL EVERY 4 HOURS PRN
Status: DISCONTINUED | OUTPATIENT
Start: 2019-08-08 | End: 2019-08-10 | Stop reason: HOSPADM

## 2019-08-08 RX ORDER — METOCLOPRAMIDE HYDROCHLORIDE 5 MG/ML
10 INJECTION INTRAMUSCULAR; INTRAVENOUS EVERY 6 HOURS PRN
Status: DISCONTINUED | OUTPATIENT
Start: 2019-08-08 | End: 2019-08-10 | Stop reason: HOSPADM

## 2019-08-08 RX ORDER — ONDANSETRON 2 MG/ML
4 INJECTION INTRAMUSCULAR; INTRAVENOUS EVERY 4 HOURS PRN
Status: DISCONTINUED | OUTPATIENT
Start: 2019-08-08 | End: 2019-08-10 | Stop reason: HOSPADM

## 2019-08-08 RX ORDER — LISINOPRIL 20 MG/1
20 TABLET ORAL DAILY
Status: DISCONTINUED | OUTPATIENT
Start: 2019-08-09 | End: 2019-08-10 | Stop reason: HOSPADM

## 2019-08-08 RX ORDER — SODIUM CHLORIDE 9 MG/ML
75 INJECTION, SOLUTION INTRAVENOUS CONTINUOUS
Status: DISCONTINUED | OUTPATIENT
Start: 2019-08-08 | End: 2019-08-09

## 2019-08-08 RX ORDER — HYDRALAZINE HYDROCHLORIDE 20 MG/ML
10 INJECTION INTRAMUSCULAR; INTRAVENOUS EVERY 6 HOURS PRN
Status: DISCONTINUED | OUTPATIENT
Start: 2019-08-08 | End: 2019-08-10 | Stop reason: HOSPADM

## 2019-08-08 RX ORDER — LISINOPRIL 10 MG/1
10 TABLET ORAL ONCE
Status: COMPLETED | OUTPATIENT
Start: 2019-08-08 | End: 2019-08-08

## 2019-08-08 RX ORDER — AMLODIPINE BESYLATE 5 MG/1
5 TABLET ORAL DAILY
Status: DISCONTINUED | OUTPATIENT
Start: 2019-08-08 | End: 2019-08-09

## 2019-08-08 RX ORDER — OXYCODONE HYDROCHLORIDE AND ACETAMINOPHEN 5; 325 MG/1; MG/1
1 TABLET ORAL EVERY 4 HOURS PRN
COMMUNITY
End: 2019-08-12 | Stop reason: SDUPTHER

## 2019-08-08 RX ORDER — LIDOCAINE HYDROCHLORIDE 20 MG/ML
INJECTION, SOLUTION EPIDURAL; INFILTRATION; INTRACAUDAL; PERINEURAL AS NEEDED
Status: DISCONTINUED | OUTPATIENT
Start: 2019-08-08 | End: 2019-08-08 | Stop reason: SURG

## 2019-08-08 RX ADMIN — PROPOFOL 50 MG: 10 INJECTION, EMULSION INTRAVENOUS at 16:03

## 2019-08-08 RX ADMIN — SUCRALFATE 1000 MG: 1 SUSPENSION ORAL at 18:03

## 2019-08-08 RX ADMIN — SODIUM CHLORIDE 125 ML/HR: 0.9 INJECTION, SOLUTION INTRAVENOUS at 01:20

## 2019-08-08 RX ADMIN — SODIUM CHLORIDE 7.5 MG/HR: 0.9 INJECTION, SOLUTION INTRAVENOUS at 22:01

## 2019-08-08 RX ADMIN — SODIUM CHLORIDE 125 ML/HR: 0.9 INJECTION, SOLUTION INTRAVENOUS at 15:59

## 2019-08-08 RX ADMIN — PROPOFOL 50 MG: 10 INJECTION, EMULSION INTRAVENOUS at 16:01

## 2019-08-08 RX ADMIN — PROPOFOL 100 MG: 10 INJECTION, EMULSION INTRAVENOUS at 15:59

## 2019-08-08 RX ADMIN — HYDROMORPHONE HYDROCHLORIDE 1 MG: 1 INJECTION, SOLUTION INTRAMUSCULAR; INTRAVENOUS; SUBCUTANEOUS at 17:27

## 2019-08-08 RX ADMIN — HALOPERIDOL LACTATE 5 MG: 5 INJECTION INTRAMUSCULAR at 19:21

## 2019-08-08 RX ADMIN — LISINOPRIL 10 MG: 10 TABLET ORAL at 19:18

## 2019-08-08 RX ADMIN — PANTOPRAZOLE SODIUM 40 MG: 40 INJECTION, POWDER, FOR SOLUTION INTRAVENOUS at 21:56

## 2019-08-08 RX ADMIN — SUCRALFATE 1000 MG: 1 SUSPENSION ORAL at 01:20

## 2019-08-08 RX ADMIN — METOCLOPRAMIDE 10 MG: 5 INJECTION, SOLUTION INTRAMUSCULAR; INTRAVENOUS at 17:13

## 2019-08-08 RX ADMIN — PANTOPRAZOLE SODIUM 40 MG: 40 INJECTION, POWDER, FOR SOLUTION INTRAVENOUS at 09:23

## 2019-08-08 RX ADMIN — AMLODIPINE BESYLATE 5 MG: 5 TABLET ORAL at 17:40

## 2019-08-08 RX ADMIN — IOHEXOL 100 ML: 350 INJECTION, SOLUTION INTRAVENOUS at 19:49

## 2019-08-08 RX ADMIN — LEVOTHYROXINE SODIUM 137 MCG: 112 TABLET ORAL at 06:27

## 2019-08-08 RX ADMIN — SODIUM CHLORIDE 1 MG/HR: 0.9 INJECTION, SOLUTION INTRAVENOUS at 17:28

## 2019-08-08 RX ADMIN — SODIUM CHLORIDE 75 ML/HR: 0.9 INJECTION, SOLUTION INTRAVENOUS at 17:18

## 2019-08-08 RX ADMIN — LIDOCAINE HYDROCHLORIDE 100 MG: 20 INJECTION, SOLUTION EPIDURAL; INFILTRATION; INTRACAUDAL; PERINEURAL at 15:59

## 2019-08-08 RX ADMIN — SUCRALFATE 1000 MG: 1 SUSPENSION ORAL at 06:27

## 2019-08-08 RX ADMIN — POTASSIUM CHLORIDE 40 MEQ: 1500 TABLET, EXTENDED RELEASE ORAL at 09:22

## 2019-08-08 RX ADMIN — HYDRALAZINE HYDROCHLORIDE 10 MG: 20 INJECTION INTRAMUSCULAR; INTRAVENOUS at 16:24

## 2019-08-08 NOTE — CONSULTS
[address]umar 89 [address] Vadim 62 y o  male MRN: [id number]  Unit/Bed#: ICU 06 Encounter: [id number]    Physician Requesting Consult: Abbie Florence  Reason for Consult: Hypertensive Emergency    Assessment/Plan:  1  Hypertensive Emergency  · Norvasc 5mg QD, Lisinopril 20mg QD -- starting tomorrow am   · Lisinopril 10mg x1 tonight at 2000  · Cardene gtt overnight with goal   · CTH, Renal US ordered  · Continue tele   · Check urine lytes   · Echo ordered   2  Intractable nausea and vomiting with abd pain in the setting of above HTN   · Nausea and vomiting x2 days with accompanying abd pain  · GI following -- negative EGD today on admission   · CT abd/-pel to r/o perforation following scope given severity of abd pain on presentation   · Zofran refuse by patient, as he states he does not work   · Reglan 10mg x1 IV with improvement of nausea and vomiting   3  Hypokalemia   · Replete electrolytes PRN with goal K>4 and Mag>2  4  Alcohol abuse   · Admits to at least 2oz whiskey daily   · Last drink 2 days ago when he became nauseous and began vomiting  · CIWA ordered, thiamine and folic acid QD  · Check UDS   5  Hypothyroidism   · Continue synthroid   · Check TSH      Critical Care Time:  31 minutes  Documented critical care time excludes any procedures documented elsewhere  It also excludes any family updates    _____________________________________________________________________      HPI:    Olimpia Chandler is a 62 y o  male with PMH hypothyroidism, chronic neck pain, ETOH abuse presents to the ICU following EGD for hypertensive crisis  Per records, he presented to the ER overnight for intractable nausea and vomiting and incidentally was found to have a SBP >200  He was given IV hydralazine and admitted to [facility] with GI following for EGD, which was negative  Preprocedure he was noted to have SBP in the 130's   While in the GI lab today, he became acutely hypertensive with SBP >200, which was not responsive to propofol admin  He is brought to the ICU for aggressive BP management and hemodynamic monitoring  On assessment, he appears pale and is forcefully vomiting and complaining of generalized abd pain  He is given 10mg reglan IV with improvement of n/v  Cardene gtt ordered with goal , and he scheduled to begin lisinopril and norvasc in the am  CTH and CT abd/pelvis pending  Review of Systems:  Const: positive for anorexia, negative for weight change, fevers, general myalgias, malaise  HEENT: negative for HA, dizziness, blurry vision, vision change, sore throat, runny nose  Chest: negative for cough, wheezing, SOB  Cardiac: negative for dyspnea, CP, palpitations   Abd: positive for abd pain (generalized), nausea, vomiting, negative for constipation, diarrhea   : negative for dysuria, flank pain  Hematuria   Ext: negative for leg swelling, cramps, myalgias   Full 12 point review of systems was performed  Aside from what was mentioned in the HPI, it is otherwise negative        Historical Information   Past Medical History:   Diagnosis Date    Abnormal glucose     last assessed-12/5/2013    Acute infective pharyngitis     last assessed-5/19/2013    Disease of thyroid gland     Elevated blood pressure reading without diagnosis of hypertension     last assessed-12/5/2013    Enlarged prostate     last assessed-12/2/2013    Exposure to STD     last assessed-3/30/2016    Hypertension     Injury of blood vessel at forearm level     last assessed-3/30/2013     Past Surgical History:   Procedure Laterality Date    NO PAST SURGERIES       Social History   Social History     Substance and Sexual Activity   Alcohol Use Yes    Comment: socially     Social History     Substance and Sexual Activity   Drug Use No     Social History     Tobacco Use   Smoking Status Never Smoker   Smokeless Tobacco Never Used       Family History:   Family History   Problem Relation Age of Onset    No Known Problems Mother    [place] Kidney failure Father        Medications:  Pertinent medications were reviewed    Current Facility-Administered Medications:  acetaminophen 650 mg Oral Q6H PRN Carlos Flaherty MD    ALPRAZolam 0 5 mg Oral Daily PRN Carlos Flaherty MD    aluminum-magnesium hydroxide-simethicone 30 mL Oral Q6H PRN Carlos Flahetry MD    amLODIPine 5 mg Oral Daily Carlos Flaherty MD    [START ON 0/7/1323] folic acid 1 mg Oral Daily Joanette Nightingale, CRNP    hydrALAZINE 10 mg Intravenous Q6H PRN Usama Melvin, CRNP    levothyroxine 137 mcg Oral Early Morning Carlos Flaherty MD    lisinopril 10 mg Oral Once Joanette Nightingale, CRNP    [START ON 8/9/2019] lisinopril 20 mg Oral Daily Joanette Nightingale, CRNP    metoclopramide 10 mg Intravenous Q6H PRN Usama Melvin, CRNP    [START ON 8/9/2019] multivitamin-minerals 1 tablet Oral Daily Joanette Nightingale, CRNP    niCARdipine 1-15 mg/hr Intravenous Titrated Joanette Nightingale, CRNP Last Rate: 1 mg/hr (08/08/19 1728)   ondansetron 4 mg Intravenous Q4H PRN Usama Melvin, CRNP    oxyCODONE-acetaminophen 1 tablet Oral Q4H PRN Carlos Flaherty MD    pantoprazole 40 mg Intravenous Q12H Annamarie Thurman MD    sodium chloride 75 mL/hr Intravenous Continuous Carlos Flaherty MD Last Rate: 75 mL/hr (08/08/19 1718)   sucralfate 1,000 mg Oral Q6H Annamarie Thurman MD    [START ON 8/9/2019] thiamine 100 mg Oral Daily Joanette Nightingale, CRNP          No Known Allergies      Vitals:   /72   Pulse 74   Temp 97 9 °F (36 6 °C)   Resp 20   Ht 5' 10" (1 778 m)   Wt 91 5 kg (201 lb 11 5 oz)   SpO2 99%   BMI 28 94 kg/m²   Body mass index is 28 94 kg/m²    SpO2: 99 %,   SpO2 Activity: At Rest,   O2 Device: None (Room air)      Intake/Output Summary (Last 24 hours) at 8/8/2019 1738  Last data filed at 8/8/2019 1645  Gross per 24 hour   Intake 1000 ml   Output 60 ml   Net 940 ml     Invasive Devices     Peripheral Intravenous Line            Peripheral IV 08/07/19 Right Antecubital 1 day                Physical Exam:  GEN: male appearing older than stated age sitting in ICU bed  HEENT: normocephalic, atraumatic, pupils 2mm PERRLA   Chest: lungs CTA posteriorly bilaterally, equal chest rise, no respiratory distress  Cardio: NSR on tele, heart RRR, no murmur, rub, gallops  GI: voiding independently  : diffuse abdominal pain on light palpation, vigorous nausea and vomiting on assessment, normoactive BS x4 quadrants, flat abdomen   EXT: no edema noted x4 extremities, freely moveable limbs x4  NEURO: AAO x3, in NAD, no overt cranial nerve deficits       Diagnostic Data:  Lab: I have personally reviewed pertinent lab results  ,   CBC:  Results from last 7 days   Lab Units 08/08/19  0619   WBC Thousand/uL 7 91   HEMOGLOBIN g/dL 13 8   HEMATOCRIT % 40 5   PLATELETS Thousands/uL 199      CMP:   Lab Results   Component Value Date    SODIUM 143 08/08/2019    K 3 4 (L) 08/08/2019     08/08/2019    CO2 26 08/08/2019    BUN 8 08/08/2019    CREATININE 0 74 08/08/2019    CALCIUM 8 6 08/08/2019    AST 21 08/08/2019    ALT 20 08/08/2019    ALKPHOS 68 08/08/2019    EGFR 103 08/08/2019   ,   PT/INR: No results found for: PT, INR,   Magnesium: No components found for: MAG,  Phosphorous: No results found for: PHOS    ABG: No results found for: PHART, HVG2MBZ, PO2ART, KPG9YIU, Q7GJBEKA, BEART, SOURCE,     Microbiology:  None    Imaging: I have personally reviewed the pertinent imaging studies on the PACS system  XR chest 1 view portable   Final Result      No acute cardiopulmonary disease              Workstation performed: [id number]         US abdomen limited    (Results Pending)   CT head wo contrast    (Results Pending)   VAS renal artery complete    (Results Pending)   CT abdomen pelvis w contrast    (Results Pending)         Cardiac/EKG/telemetry/Echo:   ST on tele      VTE Prophylaxis: Reason for no pharmacologic prophylaxis CTH pending    Code Status: Level 1 - Full Code    DAKOTA Ruiz    Portions of the record may have been created with voice recognition software  Occasional wrong word or "sound a like" substitutions may have occurred due to the inherent limitations of voice recognition software  Read the chart carefully and recognize, using context, where substitutions have occurred

## 2019-08-08 NOTE — PROGRESS NOTES
Progress Note - Yossi Pat 62 y o  male MRN: [id number]    Unit/Bed#: E5 -01 Encounter: [id number]      Assessment/Plan:  1-intractable nausea/vomiting/abdominal pain:  Patient has a history of episodes of intractable nausea and vomiting over the past year  He presented with an identical episode    -in the ER patient was given a GI cocktail, 10 mg of Reglan, 4 mg of morphine, 4 mg of Zofran x2 doses, 40 mg of Protonix, 1 g of Carafate and 5 mg of Haldol, with eventual improvement in his intractable nausea/vomiting   -due to the severity of symptoms he was admitted to the hospital   Patient was evaluated by the GI team and referred for EGD which was performed today, without any acute abnormalities   -post EGD patient continues to have nausea, vomiting, and retching  He notes he has associated generalized abdominal pain when he experiences these symptoms   -check CT scan abdomen/pelvis   -patient denies any pain radiating through to his back  He denies any chest pain  -differential diagnosis includes:  Possible nausea/vomiting secondary to hypertensive emergency, as the anesthesiologist noted patient had systolic blood pressure in the 200 while sedated on propofol   -check CT scan of brain to evaluate for central etiology   -LFTs and lipase nondiagnostic   -patient denies marijuana use    2-hypertensive urgency/emergency:  Upon presentation to the ER patient was diagnosed with hypertensive urgency  His blood pressure was 200/93 however with concurrent intractable nausea/vomiting    -patient's outpatient records note he had a prior history of hypertension however was off antihypertensives after significant weight loss  His outpatient records and appointments seem to indicate a systolic blood pressure was 130s but increasing to 150s as of late    -since admission patient has had markedly elevated blood pressure with a systolic up to the 077B     -while he was undergoing his EGD, and sedated his systolic blood pressure was in the 200s  His case was discussed with the anesthesiologist and he was transferred to the step-down unit and started on a Cardene infusion, and critical care team was consulted   -will start patient on oral Norvasc   -continue p r n  Hydralazine   -patient will undergo a workup for secondary causes of hypertension  Will check VMA, catecholamines, metanephrine, cortisol and aldosterone level  Will check a CT scan of the brain and renal artery ultrasound   -patient denies using any over-the-counter products, pseudoephedrine or illicit substances    3-hypokalemia:  Secondary to nausea and vomiting  Replete    4-anxiety:  Patient was continued on his home Xanax  The [employer] website was queried on admission, there are no red flags    5-hypothyroidism:  Continue Synthroid    6-leukocytosis:  Patient had a mild leukocytosis of 14 on admission  This normalized without antibiotic intervention  Most likely secondary to stress response    7-chronic pain:  Patient has a history of chronic neck pain for which she is prescribed oxycodone p r n     This was evaluated on the [employer] website on admission, and there were no red flags  Patient's he does not take oxycodone daily  Denies withdrawal symptoms    8-elevated bilirubin:  Patient had a mildly elevated bilirubin on admission of 1 22  This is improved 1 03  His bilirubin was fractionated this morning and the majority of his elevated bilirubin is direct, however the remainder of his LFTs are unremarkable  No evidence of significant obstructive jaundice    9-family; updated sister Senthil Kemp at pt's request via phone [phone]  Reviewed test result and transfer to stepdown and tx plan        VTE Pharmacologic Prophylaxis: Enoxaparin (Lovenox)  VTE Mechanical Prophylaxis: sequential compression device    Certification Statement: The patient will continue to require additional inpatient hospital stay due to need for further acute intervention for hypertensive urgency requiring IV Cardene infusion    Status: inpatient     Total time spent including interview, exam, review of chart, discussion with the critical care team, discussion with the anesthesiologist, and further history gathering from patient:  50 minutes    ===================================================================    Subjective:  Called by anesthesiologist that patient had hypertensive emergency during endoscopy  Patient's systolic blood pressure is noted to be greater than 200 despite propofol  Patient's EGD was unremarkable, and the anesthesiologist question whether his intractable nausea/vomiting were secondary to hypertensive emergency  Patient's care was coordinated with the critical care team   He was transferred from the endoscopy suite to the step-down unit and has been started on a Cardene infusion  Patient currently has nausea and vomiting  Notes he has pain throughout his entire abdomen  Patient notes his nausea and vomiting resolved yesterday after he received Haldol  He feels the Haldol was the main medication that alleviated his nausea and vomiting  He denies any marijuana use  Patient notes he has been having difficulty with nausea and vomiting over the past year  He notes once he develops nausea and vomiting he develops pain throughout his entire anterior abdomen  He denies that it radiates through to his back  He denies any chest pain or back pain  He denies any lower back pain  He denies any shortness of breath  Patient notes he takes oxycodone that improved his abdominal pain, however he relates he takes the oxycodone, only on a p r n  Basis for his neck pain  He notes he does not take it daily  He denies any other complaints  Physical Exam:   Temp:  [97 2 °F (36 2 °C)-98 4 °F (36 9 °C)] 98 4 °F (36 9 °C)  HR:  [18-88] 70  Resp:  [17-18] 17  BP: (135-186)/() 177/107    Gen:  Pleasant, non-tachypnic, non-dyspnic  Conversant    Sitting up in bed, dry heaving  Heart: regular rate and rhythm, S1S2 present, no murmur, rub or gallop  Lungs: clear to ausculatation bilaterally  No wheezing, crackles, or rhonchi  No accessory muscle use or respiratory distress  Abd: soft, non-tender, non-distended  NABS, no guarding, rebound or peritoneal signs  Extremities: no clubbing, cyanosis or edema  2+pedal pulses bilaterally  Full range of motion  Neuro: awake, alert  Fluent and goal directed speech  Skin: warm and dry: no petechiae, purpura and rash  LABS:   Results from last 7 days   Lab Units 08/08/19 0619 08/07/19  1128   WBC Thousand/uL 7 91 14 11*   HEMOGLOBIN g/dL 13 8 15 9   HEMATOCRIT % 40 5 45 1   PLATELETS Thousands/uL 199 250     Results from last 7 days   Lab Units 08/08/19 0619 08/07/19  1128   POTASSIUM mmol/L 3 4* 3 3*   CHLORIDE mmol/L 107 101   CO2 mmol/L 26 22   BUN mg/dL 8 12   CREATININE mg/dL 0 74 0 94   CALCIUM mg/dL 8 6 9 9       Hospital Data:    8/7:  Chest x-ray:  No acute disease    8/8:  Abdominal ultrasound:  Pending      Procedure:  8/8:  EGD:  · Performed biopsies in the upper third of the esophagus, body of the stomach, antrum, duodenal bulb and 2nd part of the duodenum  Random biopsies taken from proximal esophagus for eosinophilic esophagitis  Random biopsies taken from the stomach for H  pylori  Random biopsies taken from duodenum for celiac disease  · All observed locations appeared normal, including the esophagus, stomach and duodenum  · Small hiatal hernia    ---------------------------------------------------------------------------------------------------------------  This note has been constructed using a voice recognition system

## 2019-08-08 NOTE — UTILIZATION REVIEW
Initial Clinical Review    Admission: Date/Time/Statement:    OBS  ORDER      8/7   @  1510       IP ORDER   ENTERED     8/8  @   1628   STEPDOWN UNIT    The patient will continue to require additional inpatient hospital stay due to need for further acute intervention for hypertensive urgency requiring IV Cardene infusion    08/08/19 1628  Inpatient Admission Once     Transfer Service: General Medicine    Expected Discharge Date: 08/08/19       Question Answer Comment   Admitting Physician ERUM PATIÑO    Level of Care Level 1 Stepdown    Estimated length of stay More than 2 Midnights    Certification I certify that inpatient services are medically necessary for this patient for a duration of greater than two midnights  See H&P and MD Progress Notes for additional information about the patient's course of treatment          08/08/19 1628       ED Arrival Information     Expected Arrival Acuity Means of Arrival Escorted By Service Admission Type    - 8/7/2019 10:25 Urgent Walk-In Self General Medicine Urgent    Arrival Complaint    nausea        Chief Complaint   Patient presents with    Vomiting     pt c/o n/v for the past x12 hrs; pt states he has generalized abd  pain from the vomiting; pt denies cp/sob/ or fevers     Assessment/Plan: Intractable nausea and vomiting  Assessment & Plan  · Hx of intractable N/V in the past seen in ED on 6/23/19 but discharged at that time with GI follow up outpatient however earliest appointment was August 13th so has not seen GI yet   · Presented again with acute episode of intractable N/V that started last evening around 6 pm   · No diarrhea   · Epigastric pain started after vomiting/dry heaves   · LFTS WNL other than elevated total bilirubin 1 22, lipase 89   · Had BM this morning that was soft but not watery, no blood present   · Denies NSAID use, quit smoking at age 22   · No previous scopes or known PUD, did not get colonoscopy at age 46, no family hx of colon cancer known · N/V subsided and feeling very well know after mylanta, lidocaine viscous, reglan, zofran, protonix, carafate and haldol given in ED   · Will consult GI for further eval and recommendations   · Will continue IV protonix 40mg bid, carafate, mylanta prn   · Clear liquid diet as tolerated      Hypertensive urgency  Assessment & Plan  · Prior hx of HTN previously on medication years ago but had >70 lb weight loss on [employer] and was able to get off antihypertensives, states he has put weight back on and BP normally runs 150/90's   · POA with /93   · In setting of intractable N/V likely contributing with suspected underlying poorly controlled HTN   · Due to intractable vomiting will not start oral antihypertensive at this time as suspect would not tolerate   · Will provide with IV hydralazine prn for SBP >160   Monitor VS closely   Hypokalemia  Assessment & Plan  · secondary to ongoing IG losses with N/V, Replete, check K+ and Mag in AM      Anxiety  Assessment & Plan  · PDMP reviewed on Xanax 0 5 mg prn last filled 6/25/19 qty 30      Chronic neck pain  Assessment & Plan  · Chronic neck pain, on oxycodone 10mg prn - PDMP reviewed filled 6/25/19 qty 60   · Will hold oxycodone in setting of N/V      Hypothyroidism  Assessment & Plan  · Continue levothyroxine      Leukocytosis  Assessment & Plan  · POA with WBC 14 11, suspect this is in setting of intractable N/V  · Continue to monitor   Bull Thakkar is a 62 y o  male with past medical history of HTN in the past, anxiety, chronic neck pain, hypothyroidism who presents with intractable nausea and vomiting that started last night at 6pm  Patient was previously seen for similar symptoms on 6/23/19 and was discharged home from the ED at that time after symptoms resolved with supportive care and was told to follow up with GI  Patient has not yet seen GI as the earliest appointment available was August 13th   He was in normal state of health until acute episode of nausea and vomiting last evening  He states he ate normal breakfast and then did not eat the rest of the day and the at 6pm had one bite of [place] g[place]al[place] and started vomiting  He states when he gets hungry he does have sensation of nausea  He has been unable to get anything down since then with persistent dry heaves  He denies any diarrhea  He denies any rrecent change in foods, undercooked foods  He denies history of PUD or ulcers in past  Did not get colonoscopy at age 46  No family hx of cancers noted  In the ED he was given mylanta, lidocaine viscous, reglan, zofran, protonix, and carafate and eventually haldol with resolution of symptoms  He currently feels much better withotu N/V  He has epigastric tenderness that started after vomiting and dry heaving  Denies NSAID use  Quit smoking tobacco at age 22  Of note, on admission his /93  He was previously treated for HTN, can not recall which med he was on but then went on [employer] and lost >70 lbs and was able to get off BP med  He states he gained weight over past couple of years and BP runs 150/90 but is not on any BP meds       8/8/2019     Pt  In  GI lab   for  EGD  Became  Acutely  Hypertensive  In GI lab with  SBP  >  200, not responding t o  Propofol administration  Transferred     To   Stepdown unit  For  Aggressive   BP management and hemodynamic monitoring  Pale, c/o abd pain and  Forcefully vomiting  On ICU  Adm  Cardene  Drip started  8/9/2019    Cardene  Drip weaned off    abt  0100  8/9  Started on  Po   Antihypertensives        ED Triage Vitals [08/07/19 1029]   Temperature Pulse Respirations Blood Pressure SpO2   (!) 97 3 °F (36 3 °C) 72 18 (!) 199/91 99 %      Temp Source Heart Rate Source Patient Position - Orthostatic VS BP Location FiO2 (%)   Oral Monitor Sitting Right arm --      Pain Score       8        Wt Readings from Last 1 Encounters:   08/07/19 91 5 kg (201 lb 11 5 oz)     Additional Vital Signs:   08/08/19 0733  97 2 °F (36 2 °C)Abnormal   18Abnormal   18  186/95Abnormal   96 %  None (Room air)  Lying   08/07/19 2337  97 4 °F (36 3 °C)Abnormal   84  18  152/90  97 %  None (Room air)  Lying   08/07/19 2040  --  --  --  --  --  None (Room air)  --   08/07/19 1540  --  80  18  138/81  97 %  None (Room air)  Lying   08/07/19 1533  --  88  18  135/95  96 %  None (Room air)  Lying   08/07/19 1447  --  59  20  200/93Abnormal   98 %  None (Room air)  Lying   08/07/19 1243  --  64  20  190/91Abnormal   98 %  None (Room air)  Lying   08/07/19 1129  --  63  18  207/90Abnormal   99 %  None (Room air)  Lying   08/07/19 1029  97 3 °F (36 3 °C)Abnormal   72  18  199/91Abnormal   99 %  None (Room air)  Sitting         Pertinent Labs/Diagnostic Test Results:   Results from last 7 days   Lab Units 08/08/19  0619 08/07/19  1128   WBC Thousand/uL 7 91 14 11*   HEMOGLOBIN g/dL 13 8 15 9   HEMATOCRIT % 40 5 45 1   PLATELETS Thousands/uL 199 250   NEUTROS ABS Thousands/µL 5 20 12 70*         Results from last 7 days   Lab Units 08/08/19  0619 08/07/19  1128   SODIUM mmol/L 143 140   POTASSIUM mmol/L 3 4* 3 3*   CHLORIDE mmol/L 107 101   CO2 mmol/L 26 22   ANION GAP mmol/L 10 17*   BUN mg/dL 8 12   CREATININE mg/dL 0 74 0 94   EGFR ml/min/1 73sq m 103 90   CALCIUM mg/dL 8 6 9 9   MAGNESIUM mg/dL 2 0  --      Results from last 7 days   Lab Units 08/08/19  0619 08/07/19  1128   AST U/L 21 22   ALT U/L 20 22   ALK PHOS U/L 68 90   TOTAL PROTEIN g/dL 6 7 8 3*   ALBUMIN g/dL 3 3* 4 3   TOTAL BILIRUBIN mg/dL 1 03* 1 22*   BILIRUBIN DIRECT mg/dL 0 23*  --          Results from last 7 days   Lab Units 08/08/19  0619 08/07/19  1128   GLUCOSE RANDOM mg/dL 108 164*                   Results from last 7 days   Lab Units 08/07/19  1128   LIPASE u/L 89         CXR:  NAD    ED Treatment:   Medication Administration from 08/07/2019 1025 to 08/07/2019 1551       Date/Time Order Dose Route Action Action by Comments     08/07/2019 1239 sodium chloride 0 9 % bolus 1,000 mL 0 mL Intravenous Stopped Alyce Hoskins, RAMONE      08/07/2019 1123 sodium chloride 0 9 % bolus 1,000 mL 1,000 mL Intravenous [place] Cheryle Gavia, RN      08/07/2019 1118 ondansetron (ZOFRAN) injection 4 mg 4 mg Intravenous Given Alyce Hoskins, RAMONE      08/07/2019 1119 pantoprazole (PROTONIX) injection 40 mg 40 mg Intravenous Given Alyce Hoskins, RN      08/07/2019 1058 aluminum-magnesium hydroxide-simethicone (MYLANTA) 200-200-20 mg/5 mL oral suspension 30 mL 30 mL Oral Given Alyce Hoskins, RN      08/07/2019 1058 Lidocaine Viscous HCl (XYLOCAINE) 2 % mucosal solution 15 mL 15 mL Swish & Swallow Given Alyce Hoskins, RN      08/07/2019 1229 metoclopramide (REGLAN) injection 10 mg 10 mg Intravenous Given Alyce Hoskins, RN      08/07/2019 1411 sodium chloride 0 9 % bolus 1,000 mL 0 mL Intravenous Stopped Alyce Hoskins, RN      08/07/2019 1240 sodium chloride 0 9 % bolus 1,000 mL 1,000 mL Intravenous [place] Cheryle Gavia, RN      08/07/2019 1242 morphine (PF) 4 mg/mL injection 4 mg 4 mg Intravenous Given Alyce Hoskins, RN      08/07/2019 1406 ondansetron (ZOFRAN) injection 4 mg 4 mg Intravenous Given Alyce Hymen, RN      08/07/2019 1406 sucralfate (CARAFATE) tablet 1 g 1 g Oral Given Alyce Hoskins, RN      08/07/2019 1449 haloperidol lactate (HALDOL) injection 5 mg 5 mg Intramuscular Given Alyce Hoskins, RAMONE         Past Medical History:   Diagnosis Date    Abnormal glucose     last assessed-12/5/2013    Acute infective pharyngitis     last assessed-5/19/2013    Disease of thyroid gland     Elevated blood pressure reading without diagnosis of hypertension     last assessed-12/5/2013    Enlarged prostate     last assessed-12/2/2013    Exposure to STD     last assessed-3/30/2016    Hypertension     Injury of blood vessel at forearm level     last assessed-3/30/2013     Present on Admission:   Hypothyroidism      Admitting Diagnosis: Gastritis [K29 70]  Vomiting [R11 10]  Epigastric pain [R10 13]  Intractable nausea and vomiting [R11 2]  Age/Sex: 62 y o  male  Admission Orders:    Current Facility-Administered Medications:  acetaminophen 650 mg Oral Q6H PRN Eliz Watkins PA-C    ALPRAZolam 0 5 mg Oral Daily PRN Eliz Watkins PA-C    aluminum-magnesium hydroxide-simethicone 30 mL Oral Q6H PRN Eliz Watkins PA-C    hydrALAZINE 5 mg Intravenous Q6H PRN Davin Souza PA-C    levothyroxine 137 mcg Oral Early Morning Eliz Watkins PA-C    ondansetron 4 mg Intravenous Q6H PRN Eliz Watkins PA-C    pantoprazole 40 mg Intravenous Q12H [address] Eliz Watkins PA-C    sodium chloride 125 mL/hr Intravenous Continuous Eliz Watkins PA-C Last Rate: 125 mL/hr (08/08/19 0120)   sucralfate 1,000 mg Oral Q6H [address] Eliz Watkins PA-C        IP CONSULT TO GASTROENTEROLOGY     Tele  EGD  8/8    Network Utilization Review Department  Phone: [phone]; Fax [phone]  [email or web address]  ATTENTION: Please call with any questions or concerns to [phone]  and carefully listen to the prompts so that you are directed to the right person  Send all requests for admission clinical reviews, approved or denied determinations and any other requests to fax [phone]   All voicemails are confidential

## 2019-08-08 NOTE — CONSULTS
[facility] - New Consultation  Joe Twan 62 y o  male MRN: [id number]  Encounter: [id number]          ASSESSMENT AND PLAN:      1  Recurrent N/V: Pt presents with sudden onset of N/V after eating, with associated epigastric discomfort which he describes as "soreness" following multiple episodes of dry heaving  Pt is nontoxic appearing, hemodynamically stable, and afebrile  Pt initially had leukocytosis, which has now resolved, and may have been 2/2 vomiting  Lipase and LFTs normal, with exception of mild T  Bili elevation  Will check D  Bili; if T  Bili elevation isolated, may be representative of Gilbert's disease  Ddx for patient's N/V and epigastric discomfort also include PUD vs  Gastritis vs  Esophagitis vs  H Pylori vs  Celiac disease vs  Less likely gastroparesis vs  less likely biliary colic or pancreatitis  Cannot r/o underlying upper GI malignancy   - NPO  - check RUQ U/S  - EGD today; goal to bx for H Pylori and celiac  - antiemetics per primary team  - IVF PRN  - if biopsies from EGD reassuring, could consider outpatient CT A/P +/- gastric emptying study  - outpatient screening colonoscopy    Recommendations relayed to primary team, Stella Monique PA-C   ______________________________________________________________________    HPI:  Rolanda Izaguirre is a pleasant 62 y o  male with PMH of HTN, hypothyroidism, anxiety, and chronic neck pain who presents with 1-2 days of intractable N/V  Pt reports that he has had occasional episodes of sudden onset N/V over the past year, with last episode occurring on 6/23/19 at which time pt presented to the [place] [address] ED for evaluation and was discharged with plan to f/u with outpatient GI  Pt reports that over the past 24-48 hours, he had been feeling intermittently nauseated, and at the same time felt hungry, though felt as if he would vomit if he ate anything    Pt reports that he was eating a [place] citizen gonsalves yesterday evening, when he began to have sudden onset of N/V  After several episodes of dry heaving, pt states that he had some epigastric soreness  Pt denies any diarrhea  Pt reports that his nausea and pain are now improved, after having received mylanta, viscous lidocaine, reglan, zofran, protonix, carafate and haldol  Pt denies any sick contacts, recent travel, recent ingestion of new or raw foods, new medications, or hx of abdominal surgeries  Pt reports drinking approx  5 alcoholic beverages per week  Pt denies and recent black or bloody stool, hematemesis, or coffee-ground emesis  Pt denies odynophagia or dysphagia  Pt denies recent fevers or chills  Pt states that he has never had an EGD or colonoscopy before, and denies any family hx of GI cancer  Pt reports 70 lb intentional weight loss approx  10 years ago after starting the 1950 [employer]  Pt denies marijuana use  On admission, pt was noted to have a leukocytosis to 14 47, now normalized  Most recent Hgb is stable  CMP shows mild T  Bili elevation to 1 03 most recently  REVIEW OF SYSTEMS:    CONSTITUTIONAL: +intentional weight loss  Denies any fever, chills, rigors  HEENT: No earache or tinnitus  Denies hearing loss or visual disturbances  CARDIOVASCULAR: No chest pain or palpitations  RESPIRATORY: Denies any cough, hemoptysis, shortness of breath or dyspnea on exertion  GASTROINTESTINAL: As noted in the History of Present Illness  GENITOURINARY: No problems with urination  Denies any hematuria or dysuria  NEUROLOGIC: No dizziness or vertigo, denies headaches  MUSCULOSKELETAL: Denies any muscle or joint pain  SKIN: Denies skin rashes or itching  ENDOCRINE: Denies excessive thirst  Denies intolerance to heat or cold  PSYCHOSOCIAL: Denies depression or anxiety  Denies any recent memory loss         Historical Information   Past Medical History:   Diagnosis Date    Abnormal glucose     last assessed-12/5/2013    Acute infective pharyngitis     last assessed-5/19/2013    Disease of thyroid gland     Elevated blood pressure reading without diagnosis of hypertension     last assessed-12/5/2013    Enlarged prostate     last assessed-12/2/2013    Exposure to STD     last assessed-3/30/2016    Hypertension     Injury of blood vessel at forearm level     last assessed-3/30/2013     Past Surgical History:   Procedure Laterality Date    NO PAST SURGERIES       Social History   Social History     Substance and Sexual Activity   Alcohol Use Yes    Comment: socially     Social History     Substance and Sexual Activity   Drug Use No     Social History     Tobacco Use   Smoking Status Never Smoker   Smokeless Tobacco Never Used     Family History   Problem Relation Age of Onset    No Known Problems Mother     Kidney failure Father        Meds/Allergies       Current Facility-Administered Medications:     acetaminophen (TYLENOL) tablet 650 mg, 650 mg, Oral, Q6H PRN    ALPRAZolam (XANAX) tablet 0 5 mg, 0 5 mg, Oral, Daily PRN    aluminum-magnesium hydroxide-simethicone (MYLANTA) 200-200-20 mg/5 mL oral suspension 30 mL, 30 mL, Oral, Q6H PRN    hydrALAZINE (APRESOLINE) injection 5 mg, 5 mg, Intravenous, Q6H PRN    levothyroxine tablet 137 mcg, 137 mcg, Oral, Early Morning, 137 mcg at 08/08/19 0627    ondansetron (ZOFRAN) injection 4 mg, 4 mg, Intravenous, Q6H PRN    pantoprazole (PROTONIX) injection 40 mg, 40 mg, Intravenous, Q12H ASIA, 40 mg at 08/08/19 0923    sodium chloride 0 9 % infusion, 125 mL/hr, Intravenous, Continuous, 125 mL/hr at 08/08/19 0120    sucralfate (CARAFATE) oral suspension 1,000 mg, 1,000 mg, Oral, Q6H ASIA, 1,000 mg at 08/08/19 0627    No Known Allergies        Objective     Blood pressure (!) 186/95, pulse (!) 18, temperature (!) 97 2 °F (36 2 °C), temperature source Tympanic, resp  rate 18, weight 91 5 kg (201 lb 11 5 oz), SpO2 96 %  Body mass index is 29 79 kg/m²      PHYSICAL EXAM:      General Appearance: Alert, cooperative, no distress   HEENT:   Normocephalic, atraumatic, anicteric   Neck:  Supple, symmetrical, trachea midline   Lungs:   Clear to auscultation bilaterally; respirations even and unlabored   Heart:   Regular rate and rhythm; +S1/+S2   Abdomen:   Soft, non-tender, non-distended; normal bowel sounds; no masses, no organomegaly    Genitalia:   Deferred    Rectal:   Deferred    Extremities:  No cyanosis, clubbing or edema    Pulses:  2+ and symmetric    Skin:  No jaundice, rashes, or lesions          Lab Results:   Admission on 08/07/2019   Component Date Value    WBC 08/07/2019 14 11*    RBC 08/07/2019 4 76     Hemoglobin 08/07/2019 15 9     Hematocrit 08/07/2019 45 1     MCV 08/07/2019 95     MCH 08/07/2019 33 4     MCHC 08/07/2019 35 3     RDW 08/07/2019 12 4     MPV 08/07/2019 10 5     Platelets 27/90/0101 250     nRBC 08/07/2019 0     Neutrophils Relative 08/07/2019 90*    Immat GRANS % 08/07/2019 0     Lymphocytes Relative 08/07/2019 6*    Monocytes Relative 08/07/2019 4     Eosinophils Relative 08/07/2019 0     Basophils Relative 08/07/2019 0     Neutrophils Absolute 08/07/2019 12 70*    Immature Grans Absolute 08/07/2019 0 06     Lymphocytes Absolute 08/07/2019 0 77     Monocytes Absolute 08/07/2019 0 56     Eosinophils Absolute 08/07/2019 0 00     Basophils Absolute 08/07/2019 0 02     Sodium 08/07/2019 140     Potassium 08/07/2019 3 3*    Chloride 08/07/2019 101     CO2 08/07/2019 22     ANION GAP 08/07/2019 17*    BUN 08/07/2019 12     Creatinine 08/07/2019 0 94     Glucose 08/07/2019 164*    Calcium 08/07/2019 9 9     AST 08/07/2019 22     ALT 08/07/2019 22     Alkaline Phosphatase 08/07/2019 90     Total Protein 08/07/2019 8 3*    Albumin 08/07/2019 4 3     Total Bilirubin 08/07/2019 1 22*    eGFR 08/07/2019 90     Lipase 08/07/2019 89     Sodium 08/08/2019 143     Potassium 08/08/2019 3 4*    Chloride 08/08/2019 107     CO2 08/08/2019 26     ANION GAP 08/08/2019 10     BUN 08/08/2019 8     Creatinine 08/08/2019 0 74     Glucose 08/08/2019 108     Calcium 08/08/2019 8 6     AST 08/08/2019 21     ALT 08/08/2019 20     Alkaline Phosphatase 08/08/2019 68     Total Protein 08/08/2019 6 7     Albumin 08/08/2019 3 3*    Total Bilirubin 08/08/2019 1 03*    eGFR 08/08/2019 103     Magnesium 08/08/2019 2 0     WBC 08/08/2019 7 91     RBC 08/08/2019 4 11     Hemoglobin 08/08/2019 13 8     Hematocrit 08/08/2019 40 5     MCV 08/08/2019 99*    MCH 08/08/2019 33 6     MCHC 08/08/2019 34 1     RDW 08/08/2019 12 7     MPV 08/08/2019 10 7     Platelets 44/52/0913 199     nRBC 08/08/2019 0     Neutrophils Relative 08/08/2019 67     Immat GRANS % 08/08/2019 0     Lymphocytes Relative 08/08/2019 26     Monocytes Relative 08/08/2019 7     Eosinophils Relative 08/08/2019 0     Basophils Relative 08/08/2019 0     Neutrophils Absolute 08/08/2019 5 20     Immature Grans Absolute 08/08/2019 0 02     Lymphocytes Absolute 08/08/2019 2 08     Monocytes Absolute 08/08/2019 0 57     Eosinophils Absolute 08/08/2019 0 02     Basophils Absolute 08/08/2019 0 02     Bilirubin, Direct 08/08/2019 0 23*         Radiology Results:   Xr Chest 1 View Portable    Result Date: 8/7/2019  Narrative: CHEST INDICATION:   Vomiting, rule out free air  COMPARISON:  None EXAM PERFORMED/VIEWS:  XR CHEST PORTABLE FINDINGS:  No free air visualized  Cardiomediastinal silhouette appears unremarkable  The lungs are clear  No pneumothorax or pleural effusion  Osseous structures appear within normal limits for patient age  Impression: No acute cardiopulmonary disease  Workstation performed: [id number]       The patient was seen and examined by Dr Shonda Cevallos, all fuentes medical decisions were made with Dr Shonda Cevallos  Thank you for allowing us to participate in the care of this pleasant patient  We will follow up with you closely

## 2019-08-08 NOTE — ANESTHESIA PREPROCEDURE EVALUATION
Review of Systems/Medical History          Cardiovascular  Hyperlipidemia, Hypertension ,    Pulmonary  Negative pulmonary ROS        GI/Hepatic      Comment: Nausea and vomiting     Negative  ROS        Endo/Other  History of thyroid disease , hypothyroidism,      GYN       Hematology  Negative hematology ROS      Musculoskeletal  Negative musculoskeletal ROS        Neurology  Negative neurology ROS      Psychology   Anxiety,              Physical Exam    Airway    Mallampati score: II  TM Distance: >3 FB  Neck ROM: full     Dental   No notable dental hx     Cardiovascular  Rhythm: regular, Rate: normal, Cardiovascular exam normal    Pulmonary  Pulmonary exam normal Breath sounds clear to auscultation,     Other Findings        Anesthesia Plan  ASA Score- 2     Anesthesia Type- IV sedation with anesthesia and general with ASA Monitors  Additional Monitors:   Airway Plan:         Plan Factors-Patient not instructed to abstain from smoking on day of procedure  Patient did not smoke on day of surgery  Induction- intravenous  Postoperative Plan-     Informed Consent- Anesthetic plan and risks discussed with patient

## 2019-08-08 NOTE — ANESTHESIA POSTPROCEDURE EVALUATION
Post-Op Assessment Note    CV Status:  Unstable    Pain management: adequate     Mental Status:  Alert and awake   Hydration Status:  Euvolemic   PONV Controlled:  Controlled   Airway Patency:  Patent   Post Op Vitals Reviewed: Yes      Staff: Anesthesiologist   Comments: Patient for EGD today  his inital BP was 180/101 following sedation his BP was out of control at 208/117 under propofol sedation  Following the  procedure his BP in recovery was 226/115 I gave him Hydralazine 10 mg IV and spoke with DR Lee from [facility]  He will be transferred to ICU step down for Hypertensive urgency   BP following Hydralazine 10MG  improved to 158/81          BP     Temp      Pulse     Resp      SpO2

## 2019-08-09 ENCOUNTER — APPOINTMENT (INPATIENT)
Dept: NON INVASIVE DIAGNOSTICS | Facility: HOSPITAL | Age: 54
DRG: 305 | End: 2019-08-09
Payer: COMMERCIAL

## 2019-08-09 DIAGNOSIS — M54.12 CERVICAL RADICULOPATHY: Primary | ICD-10-CM

## 2019-08-09 DIAGNOSIS — F40.243 FEAR OF FLYING: ICD-10-CM

## 2019-08-09 PROBLEM — F12.90 MARIJUANA USE: Status: ACTIVE | Noted: 2019-08-09

## 2019-08-09 PROBLEM — F12.10 MARIJUANA ABUSE: Status: ACTIVE | Noted: 2019-08-09

## 2019-08-09 LAB
ANION GAP SERPL CALCULATED.3IONS-SCNC: 8 MMOL/L (ref 4–13)
ANION GAP SERPL CALCULATED.3IONS-SCNC: 9 MMOL/L (ref 4–13)
BUN SERPL-MCNC: 9 MG/DL (ref 5–25)
BUN SERPL-MCNC: 9 MG/DL (ref 5–25)
CALCIUM SERPL-MCNC: 8.4 MG/DL (ref 8.3–10.1)
CALCIUM SERPL-MCNC: 8.5 MG/DL (ref 8.3–10.1)
CHLORIDE SERPL-SCNC: 105 MMOL/L (ref 100–108)
CHLORIDE SERPL-SCNC: 96 MMOL/L (ref 100–108)
CO2 SERPL-SCNC: 26 MMOL/L (ref 21–32)
CO2 SERPL-SCNC: 26 MMOL/L (ref 21–32)
CREAT SERPL-MCNC: 0.78 MG/DL (ref 0.6–1.3)
CREAT SERPL-MCNC: 0.81 MG/DL (ref 0.6–1.3)
ERYTHROCYTE [DISTWIDTH] IN BLOOD BY AUTOMATED COUNT: 12.7 % (ref 11.6–15.1)
GFR SERPL CREATININE-BSD FRML MDRD: 101 ML/MIN/1.73SQ M
GFR SERPL CREATININE-BSD FRML MDRD: 99 ML/MIN/1.73SQ M
GLUCOSE SERPL-MCNC: 103 MG/DL (ref 65–140)
GLUCOSE SERPL-MCNC: 107 MG/DL (ref 65–140)
HCT VFR BLD AUTO: 40.9 % (ref 36.5–49.3)
HGB BLD-MCNC: 14.1 G/DL (ref 12–17)
MAGNESIUM SERPL-MCNC: 2 MG/DL (ref 1.6–2.6)
MCH RBC QN AUTO: 33.7 PG (ref 26.8–34.3)
MCHC RBC AUTO-ENTMCNC: 34.5 G/DL (ref 31.4–37.4)
MCV RBC AUTO: 98 FL (ref 82–98)
PLATELET # BLD AUTO: 202 THOUSANDS/UL (ref 149–390)
PMV BLD AUTO: 10.2 FL (ref 8.9–12.7)
POTASSIUM SERPL-SCNC: 3 MMOL/L (ref 3.5–5.3)
POTASSIUM SERPL-SCNC: 3.4 MMOL/L (ref 3.5–5.3)
RBC # BLD AUTO: 4.18 MILLION/UL (ref 3.88–5.62)
SODIUM SERPL-SCNC: 130 MMOL/L (ref 136–145)
SODIUM SERPL-SCNC: 140 MMOL/L (ref 136–145)
WBC # BLD AUTO: 7.49 THOUSAND/UL (ref 4.31–10.16)

## 2019-08-09 PROCEDURE — 80048 BASIC METABOLIC PNL TOTAL CA: CPT | Performed by: NURSE PRACTITIONER

## 2019-08-09 PROCEDURE — 83835 ASSAY OF METANEPHRINES: CPT | Performed by: INTERNAL MEDICINE

## 2019-08-09 PROCEDURE — 85027 COMPLETE CBC AUTOMATED: CPT | Performed by: INTERNAL MEDICINE

## 2019-08-09 PROCEDURE — 99232 SBSQ HOSP IP/OBS MODERATE 35: CPT | Performed by: INTERNAL MEDICINE

## 2019-08-09 PROCEDURE — C9113 INJ PANTOPRAZOLE SODIUM, VIA: HCPCS | Performed by: INTERNAL MEDICINE

## 2019-08-09 PROCEDURE — 82384 ASSAY THREE CATECHOLAMINES: CPT | Performed by: NURSE PRACTITIONER

## 2019-08-09 PROCEDURE — 83735 ASSAY OF MAGNESIUM: CPT | Performed by: NURSE PRACTITIONER

## 2019-08-09 PROCEDURE — 99232 SBSQ HOSP IP/OBS MODERATE 35: CPT | Performed by: PHYSICIAN ASSISTANT

## 2019-08-09 PROCEDURE — 84585 ASSAY OF URINE VMA: CPT | Performed by: NURSE PRACTITIONER

## 2019-08-09 PROCEDURE — 99231 SBSQ HOSP IP/OBS SF/LOW 25: CPT | Performed by: INTERNAL MEDICINE

## 2019-08-09 PROCEDURE — 93306 TTE W/DOPPLER COMPLETE: CPT

## 2019-08-09 PROCEDURE — 93306 TTE W/DOPPLER COMPLETE: CPT | Performed by: INTERNAL MEDICINE

## 2019-08-09 PROCEDURE — 99232 SBSQ HOSP IP/OBS MODERATE 35: CPT | Performed by: NURSE PRACTITIONER

## 2019-08-09 RX ADMIN — FOLIC ACID 1 MG: 1 TABLET ORAL at 08:21

## 2019-08-09 RX ADMIN — SUCRALFATE 1000 MG: 1 SUSPENSION ORAL at 01:27

## 2019-08-09 RX ADMIN — SUCRALFATE 1000 MG: 1 SUSPENSION ORAL at 23:02

## 2019-08-09 RX ADMIN — SUCRALFATE 1000 MG: 1 SUSPENSION ORAL at 06:24

## 2019-08-09 RX ADMIN — LEVOTHYROXINE SODIUM 137 MCG: 112 TABLET ORAL at 06:23

## 2019-08-09 RX ADMIN — ENOXAPARIN SODIUM 40 MG: 40 INJECTION SUBCUTANEOUS at 08:24

## 2019-08-09 RX ADMIN — SUCRALFATE 1000 MG: 1 SUSPENSION ORAL at 12:33

## 2019-08-09 RX ADMIN — LISINOPRIL 20 MG: 20 TABLET ORAL at 08:23

## 2019-08-09 RX ADMIN — PANTOPRAZOLE SODIUM 40 MG: 40 INJECTION, POWDER, FOR SOLUTION INTRAVENOUS at 08:12

## 2019-08-09 RX ADMIN — SUCRALFATE 1000 MG: 1 SUSPENSION ORAL at 17:20

## 2019-08-09 RX ADMIN — AMLODIPINE BESYLATE 5 MG: 5 TABLET ORAL at 08:18

## 2019-08-09 RX ADMIN — PANTOPRAZOLE SODIUM 40 MG: 40 INJECTION, POWDER, FOR SOLUTION INTRAVENOUS at 20:34

## 2019-08-09 RX ADMIN — Medication 1 TABLET: at 08:23

## 2019-08-09 RX ADMIN — THIAMINE HCL TAB 100 MG 100 MG: 100 TAB at 08:18

## 2019-08-09 NOTE — PROGRESS NOTES
Progress Note - Critical Care   Mora Frederick 62 y o  male MRN: [id number]  Unit/Bed#: ICU 06 Encounter: [id number]    Assessment/Plan:  1  Hypertensive emergency s/p propofol induction for endoscopy  · Patient was initiated on Cardene drip to keep systolic blood pressure 018 or less  · Cardiac drip was discontinued overnight systolic blood pressures have remained in the 110s  · Will start Norvasc 5 mg daily and lisinopril 20 mg daily in a m  · Continue p r n  Hydralazine for systolic blood pressure greater than 160  · Telemetry shows normal sinus rhythm with heart rates 70s to 80s  · Urine lytes resulted - K 10 4, Na 195  · Echo pending    _____________________________________________________________________    HPI/24hr events:   Patient remained hemodynamically stable overnight with no signs or symptoms of distress  Early last evening he went for his CT abdomen pelvis which was negative and denied any pain or shortness of breath  He slept comfortably and the patient had no nausea or vomiting overnight    His Cardene drip was weaned to off around 0100 and oral blood pressure medications will be started in the am       Medications:    Current Facility-Administered Medications:  acetaminophen 650 mg Oral Q6H PRN Lizandro Villeda MD    ALPRAZolam 0 5 mg Oral Daily PRN Lizandro Villeda MD    aluminum-magnesium hydroxide-simethicone 30 mL Oral Q6H PRN Lizandro Villeda MD    amLODIPine 5 mg Oral Daily Lizandro Villeda MD    enoxaparin 40 mg Subcutaneous Q24H [address], [place]    folic acid 1 mg Oral Daily DAKOTA Jensen    hydrALAZINE 10 mg Intravenous Q6H PRN DAKOTA Jimenes    levothyroxine 137 mcg Oral Early Morning Lizandro Villeda MD    lisinopril 20 mg Oral Daily DAKOTA Jensen    metoclopramide 10 mg Intravenous Q6H PRN DAKOTA Jimenes    multivitamin-minerals 1 tablet Oral Daily DAKOTA Jensen    niCARdipine 1-15 mg/hr Intravenous Titrated DAKOTA Jensen Last Rate: 2 5 mg/hr (08/09/19 0000) ondansetron 4 mg Intravenous Q4H PRN DAKOTA Cannon    oxyCODONE-acetaminophen 1 tablet Oral Q4H PRN Lul Choe MD    pantoprazole 40 mg Intravenous Q12H Yousif Damian MD    sodium chloride 75 mL/hr Intravenous Continuous Lul Choe MD Last Rate: 75 mL/hr (08/08/19 1718)   sucralfate 1,000 mg Oral Q6H Yousif Damian MD    thiamine 100 mg Oral Daily DAKOTA Philip          niCARdipine 1-15 mg/hr Last Rate: 2 5 mg/hr (08/09/19 0000)   sodium chloride 75 mL/hr Last Rate: 75 mL/hr (08/08/19 1718)         Physical exam:  Vitals: Body mass index is 28 94 kg/m²  Blood pressure 102/58, pulse 72, temperature 98 4 °F (36 9 °C), resp  rate 19, height 5' 10" (1 778 m), weight 91 5 kg (201 lb 11 5 oz), SpO2 94 %  ,  Temp  Min: 97 2 °F (36 2 °C)  Max: 98 4 °F (36 9 °C)  IBW: 73 kg    SpO2: 94 %  SpO2 Activity: At Rest  O2 Device: None (Room air)      Intake/Output Summary (Last 24 hours) at 8/9/2019 0124  Last data filed at 8/8/2019 2001  Gross per 24 hour   Intake 435 91 ml   Output 1385 ml   Net -949 09 ml       Invasive/non-invasive ventilation settings:   Respiratory    Lab Data (Last 4 hours)    None         O2/Vent Data (Last 4 hours)    None              Invasive Devices     Peripheral Intravenous Line            Peripheral IV 08/07/19 Right Antecubital 1 day    Peripheral IV 08/08/19 Left Antecubital less than 1 day                  Physical Exam:  Physical Exam   Constitutional: He is oriented to person, place, and time  He appears well-developed and well-nourished  HENT:   Head: Normocephalic and atraumatic  Right Ear: External ear normal    Left Ear: External ear normal    Nose: Nose normal    Mouth/Throat: Oropharynx is clear and moist    Eyes: Pupils are equal, round, and reactive to light  Conjunctivae and EOM are normal    Neck: Normal range of motion  Neck supple  Cardiovascular: Normal rate, regular rhythm, normal heart sounds and intact distal pulses     Pulmonary/Chest: Effort normal and breath sounds normal    On room air   Abdominal: Soft  Bowel sounds are normal    Musculoskeletal: Normal range of motion  Neurological: He is alert and oriented to person, place, and time  Skin: Skin is warm and dry  Capillary refill takes less than 2 seconds  Diagnostic Data:  Lab: I have personally reviewed pertinent lab results  CBC:   Results from last 7 days   Lab Units 08/08/19  0619 08/07/19  1128   WBC Thousand/uL 7 91 14 11*   HEMOGLOBIN g/dL 13 8 15 9   HEMATOCRIT % 40 5 45 1   PLATELETS Thousands/uL 199 250       CMP:   Results from last 7 days   Lab Units 08/08/19  0619 08/07/19  1128   SODIUM mmol/L 143 140   POTASSIUM mmol/L 3 4* 3 3*   CHLORIDE mmol/L 107 101   CO2 mmol/L 26 22   BUN mg/dL 8 12   CREATININE mg/dL 0 74 0 94   CALCIUM mg/dL 8 6 9 9   ALK PHOS U/L 68 90   ALT U/L 20 22   AST U/L 21 22     PT/INR:   No results found for: PT, INR,   Magnesium:   Results from last 7 days   Lab Units 08/08/19  0619   MAGNESIUM mg/dL 2 0       Imaging:  Xr Chest 1 View Portable    Result Date: 8/7/2019  Impression: No acute cardiopulmonary disease  Workstation performed: [id number]     Ct Head Wo Contrast    Result Date: 8/8/2019  Impression: No acute intracranial abnormality  Workstation performed: [id number]     Us Abdomen Limited    Result Date: 8/8/2019  Impression: Hepatic steatosis  Nonvisualized pancreas  Right renal cyst  Workstation performed: [id number]     Ct Abdomen Pelvis W Contrast    Result Date: 8/8/2019  Impression: No acute abdominopelvic pathology seen  No obvious complications related to endoscopy  Multilevel lumbar spinal stenosis  Workstation performed: [id number]     Cardiac lab/EKG/telemetry/ECHO:   Telemetry showing NSR  Echo pending     VTE Prophylaxis: lovenox SQ    Code Status: Level 1 - Full Code    DAKOTA Flaherty    Portions of the record may have been created with voice recognition software   Occasional wrong word or "sound a like" substitutions may have occurred due to the inherent limitations of voice recognition software  Read the chart carefully and recognize, using context, where substitutions have occurred

## 2019-08-09 NOTE — PLAN OF CARE
Encourage ambulation and adequate fluid / nutritional intake  Possible transfer to med/surg    Problem: Potential for Falls  Goal: Patient will remain free of falls  Description  INTERVENTIONS:  - Assess patient frequently for physical needs  -  Identify cognitive and physical deficits and behaviors that affect risk of falls  -  [place] fall precautions as indicated by assessment   - Educate patient/family on patient safety including physical limitations  - Instruct patient to call for assistance with activity based on assessment  - Modify environment to reduce risk of injury  - Consider OT/PT consult to assist with strengthening/mobility  Outcome: Progressing     Problem: CARDIOVASCULAR - ADULT  Goal: Maintains optimal cardiac output and hemodynamic stability  Description  INTERVENTIONS:  - Monitor I/O, vital signs and rhythm  - Monitor for S/S and trends of decreased cardiac output i e  bleeding, hypotension  - Administer and titrate ordered vasoactive medications to optimize hemodynamic stability  - Assess quality of pulses, skin color and temperature  - Assess for signs of decreased coronary artery perfusion - ex   Angina  - Instruct patient to report change in severity of symptoms  Outcome: Progressing  Goal: Absence of cardiac dysrhythmias or at baseline rhythm  Description  INTERVENTIONS:  - Continuous cardiac monitoring, monitor vital signs, obtain 12 lead EKG if indicated  - Administer antiarrhythmic and heart rate control medications as ordered  - Monitor electrolytes and administer replacement therapy as ordered  Outcome: Progressing     Problem: RESPIRATORY - ADULT  Goal: Achieves optimal ventilation and oxygenation  Description  INTERVENTIONS:  - Assess for changes in respiratory status  - Assess for changes in mentation and behavior  - Position to facilitate oxygenation and minimize respiratory effort  - Oxygen administration by appropriate delivery method based on oxygen saturation (per order) or ABGs  - Initiate smoking cessation education as indicated  - Encourage broncho-pulmonary hygiene including cough, deep breathe, Incentive Spirometry  - Assess the need for suctioning and aspirate as needed  - Assess and instruct to report SOB or any respiratory difficulty  - Respiratory Therapy support as indicated  Outcome: Progressing     Problem: GASTROINTESTINAL - ADULT  Goal: Minimal or absence of nausea and/or vomiting  Description  INTERVENTIONS:  - Administer IV fluids as ordered to ensure adequate hydration  - Maintain NPO status until nausea and vomiting are resolved  - Nasogastric tube as ordered  - Administer ordered antiemetic medications as needed  - Provide nonpharmacologic comfort measures as appropriate  - Advance diet as tolerated, if ordered  - Nutrition services referral to assist patient with adequate nutrition and appropriate food choices  Outcome: Progressing  Goal: Maintains or returns to baseline bowel function  Description  INTERVENTIONS:  - Assess bowel function  - Encourage oral fluids to ensure adequate hydration  - Administer IV fluids as ordered to ensure adequate hydration  - Administer ordered medications as needed  - Encourage mobilization and activity  - Nutrition services referral to assist patient with appropriate food choices  Outcome: Progressing  Goal: Maintains adequate nutritional intake  Description  INTERVENTIONS:  - Monitor percentage of each meal consumed  - Identify factors contributing to decreased intake, treat as appropriate  - Assist with meals as needed  - Monitor I&O, WT and lab values  - Obtain nutrition services referral as needed  Outcome: Progressing     Problem: METABOLIC, FLUID AND ELECTROLYTES - ADULT  Goal: Electrolytes maintained within normal limits  Description  INTERVENTIONS:  - Monitor labs and assess patient for signs and symptoms of electrolyte imbalances  - Administer electrolyte replacement as ordered  - Monitor response to electrolyte replacements, including repeat lab results as appropriate  - Instruct patient on fluid and nutrition as appropriate  Outcome: Progressing  Goal: Fluid balance maintained  Description  INTERVENTIONS:  - Monitor labs and assess for signs and symptoms of volume excess or deficit  - Monitor I/O and WT  - Instruct patient on fluid and nutrition as appropriate  Outcome: Progressing     Problem: HEMATOLOGIC - ADULT  Goal: Maintains hematologic stability  Description  INTERVENTIONS  - Assess for signs and symptoms of bleeding or hemorrhage  - Monitor labs  - Administer supportive blood products/factors as ordered and appropriate  Outcome: Progressing     Problem: Nutrition/Hydration-ADULT  Goal: Nutrient/Hydration intake appropriate for improving, restoring or maintaining nutritional needs  Description  Monitor and assess patient's nutrition/hydration status for malnutrition (ex- brittle hair, bruises, dry skin, pale skin and conjunctiva, muscle wasting, smooth red tongue, and disorientation)  Collaborate with interdisciplinary team and initiate plan and interventions as ordered  Monitor patient's weight and dietary intake as ordered or per policy  Utilize nutrition screening tool and intervene per policy  Determine patient's food preferences and provide high-protein, high-caloric foods as appropriate       INTERVENTIONS:  - Monitor oral intake, urinary output, labs, and treatment plans  - Assess nutrition and hydration status and recommend course of action  - Evaluate amount of meals eaten  - Assist patient with eating if necessary   - Allow adequate time for meals  - Recommend/ encourage appropriate diets, oral nutritional supplements, and vitamin/mineral supplements  - Order, calculate, and assess calorie counts as needed  - Recommend, monitor, and adjust tube feedings and TPN/PPN based on assessed needs  - Assess need for intravenous fluids  - Provide specific nutrition/hydration education as appropriate  - Include patient/family/caregiver in decisions related to nutrition  Outcome: Progressing

## 2019-08-09 NOTE — PROGRESS NOTES
Progress Note - Pulmonary   [facility] 62 y o  male MRN: [id number]  Unit/Bed#: ICU 06 Encounter: [id number]    Assessment:  1  Hypertensive urgency, resolved  2  Intractable vomiting, resolved  3  Hypokalemia  Urine potassium less than 20  Plan:  1  Continue lisinopril 20 mg p o  Daily  2  May stop amlodipine  3  Adjust the dose of lisinopril  4  Potassium replacement  5  Follow-up with his primary care physician for blood pressure control  6  Patient can be discharged home from critical care standpoint  Chief Complaint:   Denies any abdominal pain, no nausea or vomiting, no headache, feels much better today compared to yesterday  No events occurred overnight  Subjective: The rest of his review of system including 10 systems were unremarkable  Objective:     Vitals: Blood pressure 146/87, pulse 82, temperature 98 5 °F (36 9 °C), resp  rate 22, height 5' 10" (1 778 m), weight 91 5 kg (201 lb 11 5 oz), SpO2 96 %  ,Body mass index is 28 94 kg/m²  Intake/Output Summary (Last 24 hours) at 8/9/2019 1131  Last data filed at 8/9/2019 0400  Gross per 24 hour   Intake 1475 91 ml   Output 1585 ml   Net -109 09 ml       Invasive Devices     Peripheral Intravenous Line            Peripheral IV 08/07/19 Right Antecubital 2 days    Peripheral IV 08/08/19 Left Antecubital less than 1 day                Physical Exam:  Vital signs are stable, blood pressure is 127/72 with a map of 88, heart rate is normal, no JVD, S1-S2, no gallop or murmur no rub, lungs with distant breath sounds and clear, no wheezing or crackles, abdomen is benign nontender nondistended bowel sounds are positive, no edema in the periphery, neurologically he is nonfocal, cranial nerves are intact, pupils are reacting, no visual disturbances  No skin rash and no lymphadenopathy in the neck area  Labs: I have personally reviewed pertinent lab results  Imaging and other studies: I have personally reviewed pertinent reports

## 2019-08-09 NOTE — SOCIAL WORK
CM met with the patient to do a general SW assessment:    The patient lives in a ranch style home, no claudia  He lives with his partner, Annie Mitchell  He is independent with adls and ambulation  He drives  He is unemployed  No hx of vna or str needs in the past  His PCP is Mark Maxwell  He uses either [facility], [facility] or [facility] for rx needs - all in [place]  QUINTIN is his Partner, Alessandro  NO D&A hx  No MH hx    No social concerns identified

## 2019-08-09 NOTE — PROGRESS NOTES
[facility] - Inpatient Progress Note  Joe Trent 62 y o  male MRN: [id number]  Encounter: [id number]            ASSESSMENT AND PLAN:      1  Recurrent N/V:  Likely secondary to hypertensive emergency +/- marijuana use  Endoscopy reassuring, with biopsies pending  CT of the abdomen and pelvis was also without obvious pathology to suggest etiology of nausea vomiting  Symptoms now improving as blood pressure becomes more well controlled  - defer BP control to primary team  - non-ulcerogenic diet  - antiemetics per primary team  - f/u bxs from EGD  - marijuana cessation  - ETOH cessation  ______________________________________________________________________    SUBJECTIVE:  Pt reports that he had an episode of emesis upon arriving to the ICU after EGD on 08/08/2019  Patient states that he no longer feels nauseous, and states that the medications he is receiving or palliative for his nausea  Patient reports that Haldol has been especially palliative for his nausea  Patient denies any abdominal pain  Pt denied marijuana use to GI team on 8/8  Upon further discussion, patient states that he does use marijuana occasionally, not daily, and states that he does not wish to discuss this further with me        Historical Information   Past Medical History:   Diagnosis Date    Abnormal glucose     last assessed-12/5/2013    Acute infective pharyngitis     last assessed-5/19/2013    Disease of thyroid gland     Elevated blood pressure reading without diagnosis of hypertension     last assessed-12/5/2013    Enlarged prostate     last assessed-12/2/2013    Exposure to STD     last assessed-3/30/2016    Hypertension     Injury of blood vessel at forearm level     last assessed-3/30/2013     Past Surgical History:   Procedure Laterality Date    NO PAST SURGERIES       Social History   Social History     Substance and Sexual Activity   Alcohol Use Yes    Comment: socially     Social History     Substance and Sexual Activity   Drug Use No     Social History     Tobacco Use   Smoking Status Never Smoker   Smokeless Tobacco Never Used     Family History   Problem Relation Age of Onset    No Known Problems Mother     Kidney failure Father        Meds/Allergies       Current Facility-Administered Medications:     acetaminophen (TYLENOL) tablet 650 mg, 650 mg, Oral, Q6H PRN    ALPRAZolam (XANAX) tablet 0 5 mg, 0 5 mg, Oral, Daily PRN    aluminum-magnesium hydroxide-simethicone (MYLANTA) 200-200-20 mg/5 mL oral suspension 30 mL, 30 mL, Oral, Q6H PRN    amLODIPine (NORVASC) tablet 5 mg, 5 mg, Oral, Daily, 5 mg at 08/09/19 0818    enoxaparin (LOVENOX) subcutaneous injection 40 mg, 40 mg, Subcutaneous, Q24H ASIA, 40 mg at 79/25/74 4744    folic acid (FOLVITE) tablet 1 mg, 1 mg, Oral, Daily, 1 mg at 08/09/19 0821    hydrALAZINE (APRESOLINE) injection 10 mg, 10 mg, Intravenous, Q6H PRN    levothyroxine tablet 137 mcg, 137 mcg, Oral, Early Morning, 137 mcg at 08/09/19 0623    lisinopril (ZESTRIL) tablet 20 mg, 20 mg, Oral, Daily, 20 mg at 08/09/19 0823    metoclopramide (REGLAN) injection 10 mg, 10 mg, Intravenous, Q6H PRN, 10 mg at 08/08/19 1713    multivitamin-minerals (CENTRUM) tablet 1 tablet, 1 tablet, Oral, Daily, 1 tablet at 08/09/19 0823    niCARdipine (CARDENE) 25 mg (STANDARD CONCENTRATION) in sodium chloride 0 9% 250 mL, 1-15 mg/hr, Intravenous, Titrated, Stopped at 08/09/19 0115    ondansetron (ZOFRAN) injection 4 mg, 4 mg, Intravenous, Q4H PRN    oxyCODONE-acetaminophen (PERCOCET) 5-325 mg per tablet 1 tablet, 1 tablet, Oral, Q4H PRN    pantoprazole (PROTONIX) injection 40 mg, 40 mg, Intravenous, Q12H ASIA, 40 mg at 08/09/19 0812    sodium chloride 0 9 % infusion, 75 mL/hr, Intravenous, Continuous, 75 mL/hr at 08/08/19 1718    sucralfate (CARAFATE) oral suspension 1,000 mg, 1,000 mg, Oral, Q6H ASIA, 1,000 mg at 08/09/19 1233    thiamine (VITAMIN B1) tablet 100 mg, 100 mg, Oral, Daily, 100 mg at 08/09/19 0818    No Known Allergies    Objective     Blood pressure 153/87, pulse 78, temperature 98 6 °F (37 °C), temperature source Temporal, resp  rate (!) 24, height 5' 10" (1 778 m), weight 91 5 kg (201 lb 11 5 oz), SpO2 96 %  Body mass index is 28 94 kg/m²        PHYSICAL EXAM:      General Appearance:   Alert, cooperative, no distress   HEENT:   Normocephalic, atraumatic, anicteric    Neck:  Supple, symmetrical, trachea midline   Lungs:   Clear to auscultation bilaterally; respirations even and unlabored   Heart:   Regular rate and rhythm; +S1/+S2   Abdomen:   Soft, non-tender, non-distended; normal bowel sounds; no masses, no organomegaly    Genitalia:   Deferred    Rectal:   Deferred    Extremities:  No cyanosis, clubbing or edema    Pulses:  2+ and symmetric    Skin:  No jaundice, rashes, or lesions visualized          Lab Results:   Admission on 08/07/2019   Component Date Value    WBC 08/07/2019 14 11*    RBC 08/07/2019 4 76     Hemoglobin 08/07/2019 15 9     Hematocrit 08/07/2019 45 1     MCV 08/07/2019 95     MCH 08/07/2019 33 4     MCHC 08/07/2019 35 3     RDW 08/07/2019 12 4     MPV 08/07/2019 10 5     Platelets 93/10/2987 250     nRBC 08/07/2019 0     Neutrophils Relative 08/07/2019 90*    Immat GRANS % 08/07/2019 0     Lymphocytes Relative 08/07/2019 6*    Monocytes Relative 08/07/2019 4     Eosinophils Relative 08/07/2019 0     Basophils Relative 08/07/2019 0     Neutrophils Absolute 08/07/2019 12 70*    Immature Grans Absolute 08/07/2019 0 06     Lymphocytes Absolute 08/07/2019 0 77     Monocytes Absolute 08/07/2019 0 56     Eosinophils Absolute 08/07/2019 0 00     Basophils Absolute 08/07/2019 0 02     Sodium 08/07/2019 140     Potassium 08/07/2019 3 3*    Chloride 08/07/2019 101     CO2 08/07/2019 22     ANION GAP 08/07/2019 17*    BUN 08/07/2019 12     Creatinine 08/07/2019 0 94     Glucose 08/07/2019 164*    Calcium 08/07/2019 9 9     AST 08/07/2019 22     ALT 08/07/2019 22     Alkaline Phosphatase 08/07/2019 90     Total Protein 08/07/2019 8 3*    Albumin 08/07/2019 4 3     Total Bilirubin 08/07/2019 1 22*    eGFR 08/07/2019 90     Lipase 08/07/2019 89     Sodium 08/08/2019 143     Potassium 08/08/2019 3 4*    Chloride 08/08/2019 107     CO2 08/08/2019 26     ANION GAP 08/08/2019 10     BUN 08/08/2019 8     Creatinine 08/08/2019 0 74     Glucose 08/08/2019 108     Calcium 08/08/2019 8 6     AST 08/08/2019 21     ALT 08/08/2019 20     Alkaline Phosphatase 08/08/2019 68     Total Protein 08/08/2019 6 7     Albumin 08/08/2019 3 3*    Total Bilirubin 08/08/2019 1 03*    eGFR 08/08/2019 103     Magnesium 08/08/2019 2 0     WBC 08/08/2019 7 91     RBC 08/08/2019 4 11     Hemoglobin 08/08/2019 13 8     Hematocrit 08/08/2019 40 5     MCV 08/08/2019 99*    MCH 08/08/2019 33 6     MCHC 08/08/2019 34 1     RDW 08/08/2019 12 7     MPV 08/08/2019 10 7     Platelets 96/97/2884 199     nRBC 08/08/2019 0     Neutrophils Relative 08/08/2019 67     Immat GRANS % 08/08/2019 0     Lymphocytes Relative 08/08/2019 26     Monocytes Relative 08/08/2019 7     Eosinophils Relative 08/08/2019 0     Basophils Relative 08/08/2019 0     Neutrophils Absolute 08/08/2019 5 20     Immature Grans Absolute 08/08/2019 0 02     Lymphocytes Absolute 08/08/2019 2 08     Monocytes Absolute 08/08/2019 0 57     Eosinophils Absolute 08/08/2019 0 02     Basophils Absolute 08/08/2019 0 02     Bilirubin, Direct 08/08/2019 0 23*    Case Report 08/08/2019                      Value:Surgical Pathology Report                         Case: [id number]                                   Authorizing Provider:  Georgia Galan MD           Collected:           08/08/2019 1601              Ordering Location:     [facility]        Received:            08/08/2019 [address] Pathologist:           Haley Carrero MD                                                        Specimens:   A) - Duodenum, duodenal bx-r/o celiac disease                                                       B) - Stomach, gastric bx-r/o h pylori                                                               C) - Esophagus, esophageal bx-r/o EoE                                                      Final Diagnosis 08/08/2019                      Value: This result contains rich text formatting which cannot be displayed here   Additional Information 08/08/2019                      Value: This result contains rich text formatting which cannot be displayed here  Quick Gross Description 08/08/2019                      Value: This result contains rich text formatting which cannot be displayed here      Cortisol, Random 08/08/2019 41 8     Sodium, Ur 08/08/2019 195     Potassium Urine  08/08/2019 10 4     Osmolality, Ur 08/08/2019 430     Amph/Meth UR 08/08/2019 Negative     Barbiturate Ur 08/08/2019 Negative     Benzodiazepine Urine 08/08/2019 Negative     Cocaine Urine 08/08/2019 Negative     Methadone Urine 08/08/2019 Negative     Opiate Urine 08/08/2019 Negative     PCP Ur 08/08/2019 Negative     THC Urine 08/08/2019 Positive*    TSH 3RD GENERATON 08/08/2019 3 435     Sodium 08/09/2019 130*    Potassium 08/09/2019 3 0*    Chloride 08/09/2019 96*    CO2 08/09/2019 26     ANION GAP 08/09/2019 8     BUN 08/09/2019 9     Creatinine 08/09/2019 0 81     Glucose 08/09/2019 107     Calcium 08/09/2019 8 4     eGFR 08/09/2019 99     Magnesium 08/09/2019 2 0     WBC 08/09/2019 7 49     RBC 08/09/2019 4 18     Hemoglobin 08/09/2019 14 1     Hematocrit 08/09/2019 40 9     MCV 08/09/2019 98     MCH 08/09/2019 33 7     MCHC 08/09/2019 34 5     RDW 08/09/2019 12 7     Platelets 96/30/3520 202     MPV 08/09/2019 10 2     Sodium 08/09/2019 140     Potassium 08/09/2019 3 4*  Chloride 08/09/2019 105     CO2 08/09/2019 26     ANION GAP 08/09/2019 9     BUN 08/09/2019 9     Creatinine 08/09/2019 0 78     Glucose 08/09/2019 103     Calcium 08/09/2019 8 5     eGFR 08/09/2019 101          Radiology Results:   Xr Chest 1 View Portable    Result Date: 8/7/2019  Narrative: CHEST INDICATION:   Vomiting, rule out free air  COMPARISON:  None EXAM PERFORMED/VIEWS:  XR CHEST PORTABLE FINDINGS:  No free air visualized  Cardiomediastinal silhouette appears unremarkable  The lungs are clear  No pneumothorax or pleural effusion  Osseous structures appear within normal limits for patient age  Impression: No acute cardiopulmonary disease  Workstation performed: [id number]     Ct Head Wo Contrast    Result Date: 8/8/2019  Narrative: CT BRAIN - WITHOUT CONTRAST INDICATION:   hypertensive emergency  COMPARISON:  None  TECHNIQUE:  CT examination of the brain was performed  In addition to axial images, coronal 2D reformatted images were created and submitted for interpretation  Radiation dose length product (DLP) for this visit:  883 mGy-cm   This examination, like all CT scans performed in the [facility], was performed utilizing techniques to minimize radiation dose exposure, including the use of iterative reconstruction and automated exposure control  IMAGE QUALITY:  Diagnostic  FINDINGS: PARENCHYMA:  No intracranial mass, mass effect or midline shift  No CT signs of acute infarction  No acute parenchymal hemorrhage  VENTRICLES AND EXTRA-AXIAL SPACES:  Normal for the patient's age  VISUALIZED ORBITS AND PARANASAL SINUSES:  Unremarkable  CALVARIUM AND EXTRACRANIAL SOFT TISSUES:  Normal      Impression: No acute intracranial abnormality  Workstation performed: [id number]     Us Abdomen Limited    Result Date: 8/8/2019  Narrative: RIGHT UPPER QUADRANT ULTRASOUND INDICATION:    nausea, vomiting, epigastric pain  COMPARISON:  None   TECHNIQUE:   Real-time ultrasound of the right upper quadrant was performed with a curvilinear transducer with both volumetric sweeps and still imaging techniques  FINDINGS: Diffusely limited exam due to bowel gas artifact and acoustic noise artifact PANCREAS:  Nonvisualized due to bowel gas artifact  AORTA AND IVC:  Visualized portions are normal for patient age  LIVER: Size:  Within normal range  The liver measures 16 cm in the midclavicular line  Contour:  Surface contour is smooth  Parenchyma: There is moderate diffuse increased echogenicity with smooth echotexture and acoustic beam attenuation  Most consistent with moderate hepatic steatosis  Limited imaging of the main portal vein shows it to be patent and hepatopetal   BILIARY: The gallbladder is normal in caliber  No wall thickening or pericholecystic fluid  No stones or sludge identified  No sonographic Romero's sign  No intrahepatic biliary dilatation  CBD measures 2 mm  No choledocholithiasis  KIDNEY: Right kidney measures 12 cm  Right upper pole exophytic renal 1 cm cyst which appears simple ASCITES:   None  Impression: Hepatic steatosis  Nonvisualized pancreas  Right renal cyst  Workstation performed: [id number]     Ct Abdomen Pelvis W Contrast    Result Date: 8/8/2019  Narrative: CT ABDOMEN AND PELVIS WITH IV CONTRAST INDICATION:   Abdominal pain, unspecified  Endoscopy today COMPARISON:  4/1/2018 TECHNIQUE:  CT examination of the abdomen and pelvis was performed  Axial, sagittal, and coronal 2D reformatted images were created from the source data and submitted for interpretation  Radiation dose length product (DLP) for this visit:  521 mGy-cm   This examination, like all CT scans performed in the [facility], was performed utilizing techniques to minimize radiation dose exposure, including the use of iterative reconstruction and automated exposure control  IV Contrast:  100 mL of iohexol (OMNIPAQUE) Enteric Contrast:  Enteric contrast was not administered   FINDINGS: ABDOMEN LOWER CHEST:  No clinically significant abnormality identified in the visualized lower chest  LIVER/BILIARY TREE:  Unremarkable  GALLBLADDER:  No calcified gallstones  No pericholecystic inflammatory change  SPLEEN:  Unremarkable  PANCREAS:  Unremarkable  ADRENAL GLANDS:  Unremarkable  KIDNEYS/URETERS:  There are a few small right renal cysts  The left kidney is unremarkable  STOMACH AND BOWEL:  Unremarkable  APPENDIX:  No findings to suggest appendicitis  ABDOMINOPELVIC CAVITY:  No ascites or free intraperitoneal air  No lymphadenopathy  VESSELS:  Unremarkable for patient's age  PELVIS REPRODUCTIVE ORGANS:  Unremarkable for patient's age  URINARY BLADDER:  Unremarkable  ABDOMINAL WALL/INGUINAL REGIONS:  Stable tiny fat-containing umbilical hernia  OSSEOUS STRUCTURES:  There is evidence of old posterior right rib trauma  No acute bony pathology seen  There appears to be lumbar spinal stenosis most evident at L1-L2, L3-L4, and L4-L5     Impression: No acute abdominopelvic pathology seen  No obvious complications related to endoscopy  Multilevel lumbar spinal stenosis  Workstation performed: [id number]       The patient was seen and examined by Dr Addie Abebe, all fuentes medical decisions were made with Dr Addie Abebe  Thank you for allowing us to participate in the care of this pleasant patient  We will follow up with you closely

## 2019-08-09 NOTE — PROGRESS NOTES
Progress Note - Amalia Diaz 62 y o  male MRN: [id number]    Unit/Bed#: ICU 06 Encounter: [id number]      Assessment/Plan:  1-intractable nausea/vomiting/abdominal pain:  Patient has a history of episodes of intractable nausea and vomiting over the past year  He presented with an identical episode               -in the ER patient was given a GI cocktail, 10 mg of Reglan, 4 mg of morphine, 4 mg of Zofran x2 doses, 40 mg of Protonix, 1 g of Carafate and 5 mg of Haldol, with eventual improvement in his intractable nausea/vomiting              -due to the severity of symptoms he was admitted to the hospital   Patient was evaluated by the GI team and referred for EGD which was without any acute abnormalities              -post EGD patient continued to have nausea, vomiting, and retching  He noted associated generalized abdominal pain               -abdominal ultrasound and CT of the abdomen pelvis without significant abnormalities  -patient was evaluated by the GI in critical care teams:  He is now suspected that patient's intractable nausea/vomiting was secondary to his hypertensive urgency   -however the differential diagnosis also includes possible hyperemesis secondary to marijuana use           2-hypertensive urgency/crisis:  Upon presentation to the ER patient was diagnosed with hypertensive urgency  His blood pressure was 200/93 however with concurrent intractable nausea/vomiting               -patient's outpatient records note he had a prior history of hypertension however was off antihypertensives after significant weight loss  His outpatient records and appointments seem to indicate a systolic blood pressure was 130s but increasing to 150s as of late               -since admission patient had markedly elevated blood pressure with a systolic up to the 502D  -while he was undergoing his EGD, and sedated his systolic blood pressure was in the 200s    His case was discussed with the anesthesiologist and he was transferred to the step-down unit and started on a Cardene infusion, and critical care team was consulted              -patient was on a Cardene infusion which was discontinued shortly after midnight  Patient was started on Norvasc 5 mg daily, and lisinopril 10 mg which was increased to 20 mg    -  patient's blood pressure has been adequately controlled on these measures  Most recent systolic blood pressure ranging 140s-150s    -patient will be transferred out of the step-down unit                -continue workup for secondary causes of hypertension  VMA, catecholamines, metanephrine pending   -normal cortisol   -pending aldosterone level   -unremarkable CT scan of the brain   -renal artery duplex pending   -urine potassium unremarkable   -drug screen positive for marijuana                3-hypokalemia:  Secondary to nausea and vomiting  Replete     4-anxiety:  Patient was continued on his home Xanax  The [employer] website was queried on admission, there are no red flags     5-hypothyroidism:  Continue Synthroid     6-leukocytosis:  Patient had a mild leukocytosis of 14 on admission  This normalized without antibiotic intervention  Most likely secondary to stress response     7-chronic pain:  Patient has a history of chronic neck pain for which he is prescribed oxycodone p r n     This was evaluated on the [employer] website on admission, and there were no red flags        8-elevated bilirubin:  Patient had a mildly elevated bilirubin on admission of 1 22  This is improved 1 03  His bilirubin was fractionated and the majority of his elevated bilirubin is direct, however the remainder of his LFTs are unremarkable  No evidence of significant obstructive jaundice     9-family; updated sister Ric Villafuerte at pt's request via phone [phone]         VTE Pharmacologic Prophylaxis: Enoxaparin (Lovenox)  VTE Mechanical Prophylaxis: sequential compression device      Certification Statement:  The patient will continue to require additional inpatient hospital stay due to need for further acute intervention for hypertensive urgency    Status: inpatient     ==================================================================    Subjective:  Patient notes he feels markedly improved  He denies any pain anywhere at all  He denies any chest pain  He denies any muscle or rib pain after retching  He denies any abdominal pain  He denies any headache, back pain, or extremity pain  He denies any nausea or vomiting  He notes he is tolerating p o  Without any difficulties  He denies any shortness of breath, chest congestion, or cough  He denies any dizziness or lightheadedness  Patient notes he uses marijuana infrequently, not on a daily basis  He does not believe he used marijuana prior to his previous ER visit for intractable nausea/vomiting      Physical Exam:   Temp:  [97 9 °F (36 6 °C)-98 6 °F (37 °C)] 98 6 °F (37 °C)  HR:  [] 78  Resp:  [14-26] 24  BP: ()/() 153/87    Gen:  Pleasant, non-tachypnic, non-dyspnic  Conversant  Sitting up in bed  Smiling and interactive  Heart: regular rate and rhythm, S1S2 present, no murmur, rub or gallop  Lungs: clear to ausculatation bilaterally  No wheezing, crackles, or rhonchi  No accessory muscle use or respiratory distress  Abd: soft, non-tender, non-distended  NABS, no guarding, rebound or peritoneal signs  Extremities: no clubbing, cyanosis or edema  2+pedal pulses bilaterally  Full range of motion  Neuro: awake, alert and oriented  Muscles of facial expression intact  Strength globally intact  Skin: warm and dry: no petechiae, purpura and rash      LABS:   Results from last 7 days   Lab Units 08/09/19  0425 08/08/19  0619 08/07/19  1128   WBC Thousand/uL 7 49 7 91 14 11*   HEMOGLOBIN g/dL 14 1 13 8 15 9   HEMATOCRIT % 40 9 40 5 45 1   PLATELETS Thousands/uL 202 199 250     Results from last 7 days   Lab Units 08/09/19  9312 08/09/19  0425 08/08/19  0619   POTASSIUM mmol/L 3 4* 3 0* 3 4*   CHLORIDE mmol/L 105 96* 107   CO2 mmol/L 26 26 26   BUN mg/dL 9 9 8   CREATININE mg/dL 0 78 0 81 0 74   CALCIUM mg/dL 8 5 8 4 8 6       Hospital Data:  8/9:  Echocardiogram:  LEFT VENTRICLE:  Normal left ventricular systolic function, EF 96%  Normal left ventricular cavity size  Normal left ventricular wall thickness  Normal left ventricular wall motion without regional wall motion abnormalities  Diastolic function was not assessed by the technician    8/8:  CT brain:  No acute abnormality    8/8:  CT abdomen/pelvis: No acute abdominopelvic pathology seen  No obvious complications related to endoscopy  Multilevel lumbar spinal stenosis    8/7:  Chest x-ray:  No acute disease     8/8:  Abdominal ultrasound:   hepatic steatosis  Nonvisualized pancreas  Right renal cyst, appears simple        Procedure:  8/8:  EGD:  · Performed biopsies in the upper third of the esophagus, body of the stomach, antrum, duodenal bulb and 2nd part of the duodenum  Random biopsies taken from proximal esophagus for eosinophilic esophagitis  Random biopsies taken from the stomach for H  pylori  Random biopsies taken from duodenum for celiac disease  · All observed locations appeared normal, including the esophagus, stomach and duodenum  · Small hiatal hernia        ---------------------------------------------------------------------------------------------------------------  This note has been constructed using a voice recognition system

## 2019-08-10 VITALS
BODY MASS INDEX: 28.88 KG/M2 | SYSTOLIC BLOOD PRESSURE: 140 MMHG | HEIGHT: 70 IN | DIASTOLIC BLOOD PRESSURE: 77 MMHG | OXYGEN SATURATION: 95 % | WEIGHT: 201.72 LBS | TEMPERATURE: 97.7 F | HEART RATE: 83 BPM | RESPIRATION RATE: 18 BRPM

## 2019-08-10 PROBLEM — E87.6 HYPOKALEMIA: Status: RESOLVED | Noted: 2019-08-07 | Resolved: 2019-08-10

## 2019-08-10 PROBLEM — Z72.89 ALCOHOL USE: Status: ACTIVE | Noted: 2019-08-08

## 2019-08-10 PROBLEM — R11.2 INTRACTABLE NAUSEA AND VOMITING: Status: RESOLVED | Noted: 2019-08-07 | Resolved: 2019-08-10

## 2019-08-10 PROBLEM — F10.90 ALCOHOL USE: Status: ACTIVE | Noted: 2019-08-08

## 2019-08-10 PROBLEM — Z78.9 ALCOHOL USE: Status: ACTIVE | Noted: 2019-08-08

## 2019-08-10 PROBLEM — I16.0 HYPERTENSIVE URGENCY: Status: RESOLVED | Noted: 2019-08-07 | Resolved: 2019-08-10

## 2019-08-10 LAB
ANION GAP SERPL CALCULATED.3IONS-SCNC: 8 MMOL/L (ref 4–13)
BUN SERPL-MCNC: 11 MG/DL (ref 5–25)
CALCIUM SERPL-MCNC: 9.1 MG/DL (ref 8.3–10.1)
CHLORIDE SERPL-SCNC: 103 MMOL/L (ref 100–108)
CO2 SERPL-SCNC: 30 MMOL/L (ref 21–32)
CREAT SERPL-MCNC: 0.82 MG/DL (ref 0.6–1.3)
GFR SERPL CREATININE-BSD FRML MDRD: 99 ML/MIN/1.73SQ M
GLUCOSE SERPL-MCNC: 108 MG/DL (ref 65–140)
MAGNESIUM SERPL-MCNC: 2.1 MG/DL (ref 1.6–2.6)
POTASSIUM SERPL-SCNC: 3.4 MMOL/L (ref 3.5–5.3)
SODIUM SERPL-SCNC: 141 MMOL/L (ref 136–145)

## 2019-08-10 PROCEDURE — C9113 INJ PANTOPRAZOLE SODIUM, VIA: HCPCS | Performed by: INTERNAL MEDICINE

## 2019-08-10 PROCEDURE — 80048 BASIC METABOLIC PNL TOTAL CA: CPT | Performed by: INTERNAL MEDICINE

## 2019-08-10 PROCEDURE — 83735 ASSAY OF MAGNESIUM: CPT | Performed by: INTERNAL MEDICINE

## 2019-08-10 PROCEDURE — 99239 HOSP IP/OBS DSCHRG MGMT >30: CPT | Performed by: INTERNAL MEDICINE

## 2019-08-10 RX ORDER — LISINOPRIL 20 MG/1
20 TABLET ORAL DAILY
Qty: 30 TABLET | Refills: 0 | Status: SHIPPED | OUTPATIENT
Start: 2019-08-11 | End: 2019-08-19 | Stop reason: SDUPTHER

## 2019-08-10 RX ORDER — PANTOPRAZOLE SODIUM 40 MG/1
40 TABLET, DELAYED RELEASE ORAL
Status: DISCONTINUED | OUTPATIENT
Start: 2019-08-10 | End: 2019-08-10 | Stop reason: HOSPADM

## 2019-08-10 RX ORDER — AMLODIPINE BESYLATE 5 MG/1
5 TABLET ORAL DAILY
Status: DISCONTINUED | OUTPATIENT
Start: 2019-08-10 | End: 2019-08-10 | Stop reason: HOSPADM

## 2019-08-10 RX ORDER — PANTOPRAZOLE SODIUM 40 MG/1
40 TABLET, DELAYED RELEASE ORAL DAILY
Qty: 30 TABLET | Refills: 0 | Status: SHIPPED | OUTPATIENT
Start: 2019-08-10 | End: 2019-08-10 | Stop reason: HOSPADM

## 2019-08-10 RX ORDER — AMLODIPINE BESYLATE 5 MG/1
5 TABLET ORAL DAILY
Qty: 30 TABLET | Refills: 0 | Status: SHIPPED | OUTPATIENT
Start: 2019-08-10 | End: 2019-08-19 | Stop reason: SDUPTHER

## 2019-08-10 RX ADMIN — AMLODIPINE BESYLATE 5 MG: 5 TABLET ORAL at 10:12

## 2019-08-10 RX ADMIN — SUCRALFATE 1000 MG: 1 SUSPENSION ORAL at 05:04

## 2019-08-10 RX ADMIN — PANTOPRAZOLE SODIUM 40 MG: 40 INJECTION, POWDER, FOR SOLUTION INTRAVENOUS at 08:03

## 2019-08-10 RX ADMIN — ENOXAPARIN SODIUM 40 MG: 40 INJECTION SUBCUTANEOUS at 08:03

## 2019-08-10 RX ADMIN — Medication 1 TABLET: at 08:04

## 2019-08-10 RX ADMIN — THIAMINE HCL TAB 100 MG 100 MG: 100 TAB at 08:04

## 2019-08-10 RX ADMIN — LISINOPRIL 20 MG: 20 TABLET ORAL at 08:03

## 2019-08-10 RX ADMIN — FOLIC ACID 1 MG: 1 TABLET ORAL at 08:04

## 2019-08-10 RX ADMIN — LEVOTHYROXINE SODIUM 137 MCG: 112 TABLET ORAL at 05:04

## 2019-08-10 NOTE — PROGRESS NOTES
Patient Name: Wil Gomez  Patient MRN: [id number]  Date: 08/10/19  Service: [facility]    Subjective   Wil Gomez is a 62 y o  male who was admitted with nausea and vomiting  He feels well today, tolerated regular diet this morning  Anticipating discharge to home today  Patient Denies:  Nausea, abdominal pain, chest pain, shortness of breath  All others negative except as noted in HPI  Objective     Vitals  /88 (BP Location: Right arm)   Pulse 83   Temp 97 7 °F (36 5 °C) (Temporal)   Resp 18   Ht 5' 10" (1 778 m)   Wt 91 5 kg (201 lb 11 5 oz)   SpO2 95%   BMI 28 94 kg/m²   General: Alert, no apparent distress  Eyes:  No scleral icterus  ENT:  Mucous membranes moist  Card:  Regular rhythm  Lungs: Clear to ascultation b/l  No wheezes, rales, rhonchi  Abdomen:  Soft  Nontender    Bowel sounds normoactive  Skin:  No jaundice  Extremities:  No edema  Neuro: Alert and oriented x3    Laboratory Studies  Lab Results   Component Value Date    CREATININE 0 82 08/10/2019    BUN 11 08/10/2019    SODIUM 141 08/10/2019    K 3 4 (L) 08/10/2019     08/10/2019    CO2 30 08/10/2019    CALCIUM 9 1 08/10/2019    ALKPHOS 68 08/08/2019    ALB 3 3 (L) 08/08/2019    TBILI 1 03 (H) 08/08/2019    AST 21 08/08/2019    ALT 20 08/08/2019     Lab Results   Component Value Date    WBC 7 49 08/09/2019    HGB 14 1 08/09/2019    HCT 40 9 08/09/2019     08/09/2019    MCV 98 08/09/2019     No results found for: PROTIME, INR, APTT    Inhouse Medications       Current Facility-Administered Medications:     acetaminophen (TYLENOL) tablet 650 mg, 650 mg, Oral, Q6H PRN    ALPRAZolam (XANAX) tablet 0 5 mg, 0 5 mg, Oral, Daily PRN    aluminum-magnesium hydroxide-simethicone (MYLANTA) 200-200-20 mg/5 mL oral suspension 30 mL, 30 mL, Oral, Q6H PRN    enoxaparin (LOVENOX) subcutaneous injection 40 mg, 40 mg, Subcutaneous, Q24H ASIA, 40 mg at 51/65/14 5642    folic acid (FOLVITE) tablet 1 mg, 1 mg, Oral, Daily, 1 mg at 08/10/19 0804    hydrALAZINE (APRESOLINE) injection 10 mg, 10 mg, Intravenous, Q6H PRN    levothyroxine tablet 137 mcg, 137 mcg, Oral, Early Morning, 137 mcg at 08/10/19 0504    lisinopril (ZESTRIL) tablet 20 mg, 20 mg, Oral, Daily, 20 mg at 08/10/19 0803    metoclopramide (REGLAN) injection 10 mg, 10 mg, Intravenous, Q6H PRN, 10 mg at 08/08/19 1713    multivitamin-minerals (CENTRUM) tablet 1 tablet, 1 tablet, Oral, Daily, 1 tablet at 08/10/19 0804    ondansetron (ZOFRAN) injection 4 mg, 4 mg, Intravenous, Q4H PRN    oxyCODONE-acetaminophen (PERCOCET) 5-325 mg per tablet 1 tablet, 1 tablet, Oral, Q4H PRN    pantoprazole (PROTONIX) injection 40 mg, 40 mg, Intravenous, Q12H ASIA, 40 mg at 08/10/19 0803    sucralfate (CARAFATE) oral suspension 1,000 mg, 1,000 mg, Oral, Q6H ASIA, 1,000 mg at 08/10/19 0504    thiamine (VITAMIN B1) tablet 100 mg, 100 mg, Oral, Daily, 100 mg at 08/10/19 0804      Assessment/Plan:  1  Episodes of nausea and vomiting, possibly related to hypertensive emergency +/- marijuana use  EGD 8/8 shows small hiatal hernia only  Biopsies pending  Symptoms resolved today  Would strive for better control of blood pressure and avoidance of marijuana      Principal Problem:    Intractable nausea and vomiting  Active Problems:    Hypothyroidism    Hypertensive urgency    Hypokalemia    Anxiety    Chronic neck pain    Alcohol abuse    Marijuana use    Traci Matos PA-C

## 2019-08-10 NOTE — NURSING NOTE
Reviewed Dc instructions and medications with pt no questions at this time  IV removed, belongings packed  Pt walked off unit in stable condition with belongings and family

## 2019-08-10 NOTE — DISCHARGE INSTRUCTIONS
Discharge instructions:      Please take your medication as directed: You been started on 2 pills for your blood pressure  Please see your family doctor in 1 week for a checkup, and to repeat your blood pressure checked    Please use your home blood pressure cuff, and check your blood pressure once a day  Please call your family doctor if your blood pressure is greater than 150/90      Please return to the ER immediately if your blood pressure is greater than 200/100    Please be sure to stay well hydrated and try to drink at least 64 oz of water daily    Please return to the ER with any problems at all, especially any chest pain, difficulty breathing, dizziness, weakness, lightheadedness, any nausea or vomiting    Please to not drink any alcohol as this can interfere with your medications  Please note use any marijuana as this can cause a nausea/vomiting/dry heaving syndrome

## 2019-08-10 NOTE — PROGRESS NOTES
The pantoprazole has / have been converted to Oral per [facility] for Adults as approved by the Pharmacy and Therapeutics Committee  The patient met all eligible criteria:  1) Age = >22 years old   2) Received at least one dose of the IV form   3) Receiving at least one other scheduled oral/enteral medication   4) Tolerating an oral/enteral diet   and did not have any exclusions:   1) Critical care patient   2) Active GI bleed (IF assessing H2RAs or PPIs)   3) Continuous tube feeding (IF assessing cipro, doxycycline, levofloxacin, minocycline, rifampin, or voriconazole)   4) Receiving PO vancomycin (IF assessing metronidazole)   5) Persistent nausea and/or vomiting   6) Ileus or gastrointestinal obstruction   7) Tania/nasogastric tube set for continuous suction   8) Specific order not to automatically convert to PO (in the order's comments or if discussed in the most recent Infectious Disease or primary team's progress notes)

## 2019-08-10 NOTE — PLAN OF CARE
Problem: Potential for Falls  Goal: Patient will remain free of falls  Description  INTERVENTIONS:  - Assess patient frequently for physical needs  -  Identify cognitive and physical deficits and behaviors that affect risk of falls  -  [place] fall precautions as indicated by assessment   - Educate patient/family on patient safety including physical limitations  - Instruct patient to call for assistance with activity based on assessment  - Modify environment to reduce risk of injury  - Consider OT/PT consult to assist with strengthening/mobility  Outcome: Progressing     Problem: CARDIOVASCULAR - ADULT  Goal: Maintains optimal cardiac output and hemodynamic stability  Description  INTERVENTIONS:  - Monitor I/O, vital signs and rhythm  - Monitor for S/S and trends of decreased cardiac output i e  bleeding, hypotension  - Administer and titrate ordered vasoactive medications to optimize hemodynamic stability  - Assess quality of pulses, skin color and temperature  - Assess for signs of decreased coronary artery perfusion - ex   Angina  - Instruct patient to report change in severity of symptoms  Outcome: Progressing  Goal: Absence of cardiac dysrhythmias or at baseline rhythm  Description  INTERVENTIONS:  - Continuous cardiac monitoring, monitor vital signs, obtain 12 lead EKG if indicated  - Administer antiarrhythmic and heart rate control medications as ordered  - Monitor electrolytes and administer replacement therapy as ordered  Outcome: Progressing     Problem: RESPIRATORY - ADULT  Goal: Achieves optimal ventilation and oxygenation  Description  INTERVENTIONS:  - Assess for changes in respiratory status  - Assess for changes in mentation and behavior  - Position to facilitate oxygenation and minimize respiratory effort  - Oxygen administration by appropriate delivery method based on oxygen saturation (per order) or ABGs  - Initiate smoking cessation education as indicated  - Encourage broncho-pulmonary hygiene including cough, deep breathe, Incentive Spirometry  - Assess the need for suctioning and aspirate as needed  - Assess and instruct to report SOB or any respiratory difficulty  - Respiratory Therapy support as indicated  Outcome: Progressing     Problem: GASTROINTESTINAL - ADULT  Goal: Minimal or absence of nausea and/or vomiting  Description  INTERVENTIONS:  - Administer IV fluids as ordered to ensure adequate hydration  - Maintain NPO status until nausea and vomiting are resolved  - Nasogastric tube as ordered  - Administer ordered antiemetic medications as needed  - Provide nonpharmacologic comfort measures as appropriate  - Advance diet as tolerated, if ordered  - Nutrition services referral to assist patient with adequate nutrition and appropriate food choices  Outcome: Progressing  Goal: Maintains or returns to baseline bowel function  Description  INTERVENTIONS:  - Assess bowel function  - Encourage oral fluids to ensure adequate hydration  - Administer IV fluids as ordered to ensure adequate hydration  - Administer ordered medications as needed  - Encourage mobilization and activity  - Nutrition services referral to assist patient with appropriate food choices  Outcome: Progressing  Goal: Maintains adequate nutritional intake  Description  INTERVENTIONS:  - Monitor percentage of each meal consumed  - Identify factors contributing to decreased intake, treat as appropriate  - Assist with meals as needed  - Monitor I&O, WT and lab values  - Obtain nutrition services referral as needed  Outcome: Progressing     Problem: METABOLIC, FLUID AND ELECTROLYTES - ADULT  Goal: Electrolytes maintained within normal limits  Description  INTERVENTIONS:  - Monitor labs and assess patient for signs and symptoms of electrolyte imbalances  - Administer electrolyte replacement as ordered  - Monitor response to electrolyte replacements, including repeat lab results as appropriate  - Instruct patient on fluid and nutrition as appropriate  Outcome: Progressing  Goal: Fluid balance maintained  Description  INTERVENTIONS:  - Monitor labs and assess for signs and symptoms of volume excess or deficit  - Monitor I/O and WT  - Instruct patient on fluid and nutrition as appropriate  Outcome: Progressing     Problem: HEMATOLOGIC - ADULT  Goal: Maintains hematologic stability  Description  INTERVENTIONS  - Assess for signs and symptoms of bleeding or hemorrhage  - Monitor labs  - Administer supportive blood products/factors as ordered and appropriate  Outcome: Progressing     Problem: Nutrition/Hydration-ADULT  Goal: Nutrient/Hydration intake appropriate for improving, restoring or maintaining nutritional needs  Description  Monitor and assess patient's nutrition/hydration status for malnutrition (ex- brittle hair, bruises, dry skin, pale skin and conjunctiva, muscle wasting, smooth red tongue, and disorientation)  Collaborate with interdisciplinary team and initiate plan and interventions as ordered  Monitor patient's weight and dietary intake as ordered or per policy  Utilize nutrition screening tool and intervene per policy  Determine patient's food preferences and provide high-protein, high-caloric foods as appropriate       INTERVENTIONS:  - Monitor oral intake, urinary output, labs, and treatment plans  - Assess nutrition and hydration status and recommend course of action  - Evaluate amount of meals eaten  - Assist patient with eating if necessary   - Allow adequate time for meals  - Recommend/ encourage appropriate diets, oral nutritional supplements, and vitamin/mineral supplements  - Order, calculate, and assess calorie counts as needed  - Recommend, monitor, and adjust tube feedings and TPN/PPN based on assessed needs  - Assess need for intravenous fluids  - Provide specific nutrition/hydration education as appropriate  - Include patient/family/caregiver in decisions related to nutrition  Outcome: Progressing

## 2019-08-10 NOTE — DISCHARGE SUMMARY
Discharge Summary - [facility] 62 y o  male MRN: [id number]    [address] [address] MED SURG Room / Bed: [address] 552/E5 -* Encounter: [id number]    BRIEF OVERVIEW      Admission Date: 8/7/2019       Discharge Date:  08/10/2019    Primary Diagnoses  Principal Problem (Resolved): Intractable nausea and vomiting  Active Problems:    Hypothyroidism    Anxiety    Chronic neck pain    Alcohol use    Marijuana use  Resolved Problems:    Hypertensive urgency    Hypokalemia    Leukocytosis      Service:  [facility] Internal Medicine, [facility]  Consulting Providers   GI:  Dr Brian Cevallos care:  Dr Tereza Payne Studies:  8/9:  Echocardiogram:  LEFT VENTRICLE:  Normal left ventricular systolic function, EF 81%  Normal left ventricular cavity size  Normal left ventricular wall thickness  Normal left ventricular wall motion without regional wall motion abnormalities  Diastolic function was not assessed by the technician     8/8:  CT brain:  No acute abnormality     8/8:  CT abdomen/pelvis: No acute abdominopelvic pathology seen   No obvious complications related to endoscopy  Multilevel lumbar spinal stenosis     8/7:  Chest x-ray:  No acute disease     8/8:  Abdominal ultrasound:   hepatic steatosis  Nonvisualized pancreas  Right renal cyst, appears simple        Procedure:  8/8:  EGD:  · Performed biopsies in the upper third of the esophagus, body of the stomach, antrum, duodenal bulb and 2nd part of the duodenum   Random biopsies taken from proximal esophagus for eosinophilic esophagitis  Random biopsies taken from the stomach for H  pylori  Random biopsies taken from duodenum for celiac disease  · All observed locations appeared normal, including the esophagus, stomach and duodenum  · Small hiatal hernia     Results from last 7 days   Lab Units 08/09/19  0425 08/08/19  0619 08/07/19  1128   WBC Thousand/uL 7 49 7 91 14 11*   HEMOGLOBIN g/dL 14 1 13 8 15 9   HEMATOCRIT % 40 9 40 5 45 1   PLATELETS Thousands/uL 202 199 250     Results from last 7 days   Lab Units 08/10/19  0502 08/09/19  0611 08/09/19  0425   POTASSIUM mmol/L 3 4* 3 4* 3 0*   CHLORIDE mmol/L 103 105 96*   CO2 mmol/L 30 26 26   BUN mg/dL 11 9 9   CREATININE mg/dL 0 82 0 78 0 81   CALCIUM mg/dL 9 1 8 5 8 4       History and Physical Exam:  Please refer to the Admission H&P note    Hospital Course by Problem  1-intractable nausea/vomiting/abdominal pain:  Patient has a history of episodes of intractable nausea and vomiting over the past year  Jamil Zhang presented with an identical episode               -RQ the ER patient was given a GI cocktail, 10 mg of Reglan, 4 mg of morphine, 4 mg of Zofran x2 doses, 40 mg of Protonix, 1 g of Carafate and 5 mg of Haldol, with eventual improvement in his intractable nausea/vomiting              -due to the severity of symptoms he was admitted to the hospital  Rosio Luis Angel was evaluated by the GI team and referred for EGD which was without any acute abnormalities              -post EGD patient continued to have nausea, vomiting, and retching   He noted associated generalized abdominal pain               -abdominal ultrasound and CT of the abdomen pelvis without significant abnormalities  -patient was evaluated by the GI in critical care teams:  He is now suspected that patient's intractable nausea/vomiting was secondary to his hypertensive urgency              -however the differential diagnosis also includes possible hyperemesis secondary to marijuana use   -with control of his blood pressure, patient's nausea/vomiting have completely resolved  He has tolerated p o  For greater than 24 hours without any symptoms pill to be discharged home to continue antihypertensives    He was also instructed not to take any marijuana at all, and was educated regarding hyperemesis secondary to marijuana syndrome           2-hypertensive urgency/crisis:  Upon presentation to the ER patient was diagnosed with hypertensive urgency   His blood pressure was 200/93 however with concurrent intractable nausea/vomiting               -patient's outpatient records note he had a prior history of hypertension however was off antihypertensives after significant weight loss   His outpatient records and appointments seem to indicate a systolic blood pressure was 130s but increasing to 150s as of late               -since admission patient had markedly elevated blood pressure with a systolic up to the 356A              -while he was undergoing his EGD, and sedated his systolic blood pressure was in the 200s   His case was discussed with the anesthesiologist and he was transferred to the step-down unit and started on a Cardene infusion, and critical care team was consulted              -SPMNMPY was on a Cardene infusion which was successfully discontinued  Patient was started on Norvasc 5 mg daily, and lisinopril 20 mg               -  patient's blood pressure has been adequately controlled on these 2 antihypertensives  His blood pressure has been ranging 142/81, 150/90, and 162/88 on this dual therapy  Patient will therefore be discharged on this dual therapy  He will follow up as an outpatient with his primary care provider with the ultimate goal of titrating up his medications with hopes that monotherapy may control his symptoms  As he is currently controlled on this dual therapy he will be discharged home on this current hospital regimen                -continue workup for secondary causes of hypertension   VMA, catecholamines, metanephrine pending:   He be followed up in the office              -normal cortisol              -pending aldosterone level      -unremarkable CT scan of the brain              -renal artery duplex was not able to be performed during this hospitalization:  However as patient's blood pressure is currently controlled on this regimen, this will be deferred at this time   -patient will be discharged home today, will check his blood pressure once daily at home  He will see his family physician in 1 week for a checkup and blood pressure check, and to re-evaluate his medications  Patient was instructed to call his family physician if his blood pressure is greater than 150/90, and return to the ER his blood pressure is greater than 200/100 or if he has any symptoms                3-hypokalemia:  Secondary to nausea and vomiting   Repleted     4-anxiety:  Patient was continued on his home Xanax   The [employer] website was queried on admission, there are no red flags     5-hypothyroidism:  Continue Synthroid     6-leukocytosis:  Patient had a mild leukocytosis of 14 on admission   This normalized without antibiotic intervention   Most likely secondary to stress response     7-chronic pain:  Patient has a history of chronic neck pain for which he is prescribed oxycodone p r n     This was evaluated on the [employer] website on admission, and there were no red flags        8-elevated bilirubin:  Patient had a mildly elevated bilirubin on admission of 1 22   This is improved 1 03   His bilirubin was fractionated and the majority of his elevated bilirubin is direct, however the remainder of his LFTs are unremarkable   No evidence of significant obstructive jaundice    9-alcohol use:  Patient relates he drinks 1-2 shots of whiskey daily  He was counseled for complete cessation, especially in light of his new hypertension and intractable nausea/vomiting  EGD was noted to be unremarkable  He is placed on thiamine and folic acid  He did not have any signs or symptoms of alcohol withdrawal during his hospital stay  10-marijuana use:  Patient was educated regarding the possible link between intractable nausea/vomiting and marijuana use  He was counseled for complete cessation    Patient notes he uses this on a rare basis, and intends cessation      VTE Pharmacologic Prophylaxis: Enoxaparin (Lovenox)  VTE Mechanical Prophylaxis: sequential compression device    Discharge Condition: Improved  Discharge Disposition: Home/Self Care    Discharge Note and Physical Exam:   Patient denies any complaints at all  He notes he feels back to his baseline  He denies any nausea or vomiting  He tolerated breakfast without any difficulty  He denies any headache  He denies any visual changes or difficulty speaking  He denies any chest pain or palpitations  He denies any abdominal pain, nausea, vomiting, diarrhea or constipation  He notes he is ambulating and denies any dizziness or lightheadedness  He denies any numbness or weakness  Denies any balance problems  He notes he feels back to his baseline and requests discharge home    Vitals:    08/10/19 0738   BP: 162/88   Pulse: 83   Resp: 18   Temp: 97 7 °F (36 5 °C)   SpO2: 95%     General:  Pleasant, non-tachypnic, non-dyspnic  Conversant  Heart: Regular rate and rhythm, S1S2 present  No murmur, rub or gallop  Lungs: Clear to auscultation bilaterally, no wheezing, rhonchi, or crackles  Good air movement  No accessory muscle use or respiratory distress  Abdomen: soft, non-tender, non-distended, NABS  No rebound or guarding  No mass or peritoneal signs  Extremities: no clubbing, cyanosis or edema  2+ pedal pulses bilaterally  Neurologic:  Awake, alert, oriented  Fluent and goal directed speech  Strength globally intact  Muscles of facial expression intact  Skin: warm and dry  No petechiae, purpura or rash  Discharge Medications   Please see Medical Reconciliation Discharge Form    Discharge Follow Up Appointments:   Shasta Wells, DO: 1 week    Discharge  Statement   Total Time Spent today including physical exam, discussion with patient, and discharge arrangements/care = 38 minutes      This note has been constructed using a voice recognition system

## 2019-08-11 RX ORDER — OXYCODONE HYDROCHLORIDE AND ACETAMINOPHEN 5; 325 MG/1; MG/1
1 TABLET ORAL EVERY 4 HOURS PRN
Refills: 0 | Status: CANCELLED | OUTPATIENT
Start: 2019-08-11

## 2019-08-11 RX ORDER — ALPRAZOLAM 0.5 MG/1
0.5 TABLET ORAL DAILY PRN
Qty: 30 TABLET | Refills: 0 | Status: SHIPPED | OUTPATIENT
Start: 2019-08-11 | End: 2019-11-26 | Stop reason: SDUPTHER

## 2019-08-12 ENCOUNTER — TRANSITIONAL CARE MANAGEMENT (OUTPATIENT)
Dept: FAMILY MEDICINE CLINIC | Facility: CLINIC | Age: 54
End: 2019-08-12

## 2019-08-12 ENCOUNTER — TELEPHONE (OUTPATIENT)
Dept: FAMILY MEDICINE CLINIC | Facility: CLINIC | Age: 54
End: 2019-08-12

## 2019-08-12 DIAGNOSIS — M54.12 CERVICAL RADICULOPATHY: Primary | ICD-10-CM

## 2019-08-12 RX ORDER — OXYCODONE HYDROCHLORIDE AND ACETAMINOPHEN 5; 325 MG/1; MG/1
1 TABLET ORAL EVERY 4 HOURS PRN
Qty: 60 TABLET | Refills: 0 | Status: SHIPPED | OUTPATIENT
Start: 2019-08-12 | End: 2019-08-14

## 2019-08-12 NOTE — TELEPHONE ENCOUNTER
Last med check 4/16/18    Nov:  TCM 8/19/19    OxyCodone HCL 10 MG tab #60 20 days  PMPD checked 8/12/2019 last fill on 6/25/19    Pt will need pain agreement noted in appt notes for 8/19/19  Thank you!

## 2019-08-12 NOTE — TELEPHONE ENCOUNTER
Pt called the office this morning and scheduled a TCM with Dr Evi Goel for 8/19/19 @ 10:45am      [facility] /high blood pressure/ 8/7/19 - 8/10/19

## 2019-08-13 ENCOUNTER — TELEPHONE (OUTPATIENT)
Dept: FAMILY MEDICINE CLINIC | Facility: CLINIC | Age: 54
End: 2019-08-13

## 2019-08-13 LAB
ALDOST SERPL-MCNC: 3.9 NG/DL (ref 0–30)
METANEPH 24H UR-MRATE: 170 UG/24 HR (ref 45–290)
METANEPHS 24H UR-MCNC: 113 UG/L
NORMETANEPHRINE 24H UR-MCNC: 184 UG/L
NORMETANEPHRINE 24H UR-MRATE: 276 UG/24 HR (ref 82–500)

## 2019-08-13 NOTE — TELEPHONE ENCOUNTER
Patient walked in stating the wrong prescription was sent for his pain medication  Percocet was sent to [facility] and he states he has been taking Oxycodone 10mg every 8 hours PRN #60  For the past few years  He also would like to change pharmacies to the [facility] in [place] for the Oxycodone and Xanax  The Pharmacy change agreement form has been completed

## 2019-08-14 ENCOUNTER — TRANSITIONAL CARE MANAGEMENT (OUTPATIENT)
Dept: FAMILY MEDICINE CLINIC | Facility: CLINIC | Age: 54
End: 2019-08-14

## 2019-08-14 ENCOUNTER — TELEPHONE (OUTPATIENT)
Dept: FAMILY MEDICINE CLINIC | Facility: CLINIC | Age: 54
End: 2019-08-14

## 2019-08-14 DIAGNOSIS — M54.12 CERVICAL RADICULOPATHY: Primary | ICD-10-CM

## 2019-08-14 LAB
DOPAMINE 24H UR-MRATE: 289 UG/24 HR (ref 0–510)
DOPAMINE UR-MCNC: 157 UG/L
EPINEPH 24H UR-MRATE: 15 UG/24 HR (ref 0–20)
EPINEPH UR-MCNC: 8 UG/L
NOREPINEPH 24H UR-MRATE: 61 UG/24 HR (ref 0–135)
NOREPINEPH UR-MCNC: 33 UG/L

## 2019-08-14 RX ORDER — OXYCODONE HYDROCHLORIDE 10 MG/1
10 TABLET ORAL EVERY 8 HOURS PRN
Qty: 60 TABLET | Refills: 0 | Status: SHIPPED | OUTPATIENT
Start: 2019-08-14 | End: 2019-09-18 | Stop reason: SDUPTHER

## 2019-08-14 NOTE — TELEPHONE ENCOUNTER
Pt called stating he went to the Pharmacy to  his Rx and the wrong Rx was sent  Pt states he was previously on     Oxycodone 10mg   Take 1 tablet (10 mg total) by mouth every 8 (eight) hours as needed for severe pain Max Daily Amount: 30 mg   #60        For some reason this was discontinued 9/20/2018    PDMP checked last filled 6/25/2019    This also shows that Percocet was picked up yesterday 8/13 but according to Pt he states he was not aware it was the wrong Rx until he got home  Pt has not taking any of the Percocet and refuses to  He is requesting to please send the original Rx requested for Oxycodone 10mg  Any questions please call Pt

## 2019-08-15 NOTE — TELEPHONE ENCOUNTER
I called the pharmacy, [facility]  They will make an exception and take back the oxycodone acetaminophen and dispense the oxycodone 10 mg # 60 that I sent a prescription for this evening  I did let patient know  Patient does question about 30 dollars he spent on the oxycodone acetominophen  I told him that I could not ask the pharmacist to give him money back    I will discuss it my  and Dr Scot Thompson

## 2019-08-15 NOTE — TELEPHONE ENCOUNTER
It looks like the med was teed up by Quirino Waldron, Dr Kimmy Abarca did talk to patient and send in correct me

## 2019-08-15 NOTE — TELEPHONE ENCOUNTER
Jennifer Mac spoke to patient and pharmacy and send a new oxycodone prescription and the pharmacy will fill it as long as patient brings back oxycodone acetaminophen on used  Patient will do this but he is asking me about the 30 dollars he spent on the prescription that he did not ask for  I told him that I would pass along his concerns

## 2019-08-16 LAB
VMA 24H UR-MRATE: 4.4 MG/24 HR (ref 0–7.5)
VMA UR-MCNC: 2.4 MG/L

## 2019-08-19 ENCOUNTER — OFFICE VISIT (OUTPATIENT)
Dept: FAMILY MEDICINE CLINIC | Facility: CLINIC | Age: 54
End: 2019-08-19
Payer: COMMERCIAL

## 2019-08-19 VITALS
WEIGHT: 210.4 LBS | TEMPERATURE: 97.4 F | BODY MASS INDEX: 30.12 KG/M2 | DIASTOLIC BLOOD PRESSURE: 82 MMHG | HEIGHT: 70 IN | SYSTOLIC BLOOD PRESSURE: 132 MMHG | OXYGEN SATURATION: 97 % | RESPIRATION RATE: 17 BRPM | HEART RATE: 76 BPM

## 2019-08-19 DIAGNOSIS — E87.6 HYPOKALEMIA: ICD-10-CM

## 2019-08-19 DIAGNOSIS — R11.2 INTRACTABLE VOMITING WITH NAUSEA, UNSPECIFIED VOMITING TYPE: ICD-10-CM

## 2019-08-19 DIAGNOSIS — I16.0 HYPERTENSIVE URGENCY: Primary | ICD-10-CM

## 2019-08-19 PROCEDURE — 99495 TRANSJ CARE MGMT MOD F2F 14D: CPT | Performed by: FAMILY MEDICINE

## 2019-08-19 RX ORDER — LISINOPRIL 20 MG/1
20 TABLET ORAL DAILY
Qty: 90 TABLET | Refills: 1 | Status: SHIPPED | OUTPATIENT
Start: 2019-08-19 | End: 2019-11-21 | Stop reason: SDUPTHER

## 2019-08-19 RX ORDER — AMLODIPINE BESYLATE 5 MG/1
5 TABLET ORAL DAILY
Qty: 90 TABLET | Refills: 1 | Status: SHIPPED | OUTPATIENT
Start: 2019-08-19 | End: 2019-08-19 | Stop reason: SDUPTHER

## 2019-08-19 RX ORDER — AMLODIPINE BESYLATE 5 MG/1
5 TABLET ORAL DAILY
Qty: 90 TABLET | Refills: 1 | Status: SHIPPED | OUTPATIENT
Start: 2019-08-19 | End: 2019-11-21 | Stop reason: SDUPTHER

## 2019-08-19 RX ORDER — LISINOPRIL 20 MG/1
20 TABLET ORAL DAILY
Qty: 90 TABLET | Refills: 1 | Status: SHIPPED | OUTPATIENT
Start: 2019-08-19 | End: 2019-08-19 | Stop reason: SDUPTHER

## 2019-08-19 NOTE — PROGRESS NOTES
Assessment/Plan:   1  Hypertensive urgency  Reviewed patient's symptoms today  At this time, his blood pressure appears very well controlled today  Reviewed his home blood pressure readings  He was advised on the proper technique to check his blood pressure  Continue with a strict diet and exercise plan  Continue as well with current treatment of lisinopril and amlodipine  Recheck BMP this week as well as his routine blood work next month  - lisinopril (ZESTRIL) 20 mg tablet; Take 1 tablet (20 mg total) by mouth daily  Dispense: 90 tablet; Refill: 1  - amLODIPine (NORVASC) 5 mg tablet; Take 1 tablet (5 mg total) by mouth daily  Dispense: 90 tablet; Refill: 1    2  Intractable vomiting with nausea, unspecified vomiting type  Unclear as to the exact cause of patient's symptoms today  His symptoms have completely resolved  At this time, will continue with his current treatment of Pepcid  3  Hypokalemia  Likely secondary to his intractable nausea and vomiting  Recheck BMP this week  - Basic metabolic panel; Future     There are no diagnoses linked to this encounter  Subjective:    Chief Complaint   Patient presents with    Transition of Care Management     8/7-8/10 high BP        Patient ID: Shameka Reyes is a 62 y o  male  He presented to [facility] on August 7th  He presented secondary to intractable nausea and while at that time, he was found to have severely elevated blood pressure readings  His blood pressures were greater than 146 systolic  He was evaluated while ED  He was subsequently transferred to ICU and placed on a Cardene infusion  This was able to stabilize his blood pressure elevations  He was transitioned to amlodipine as well as lisinopril which she has been tolerating  While inpatient, he did have an EGD which did not show any abnormalities  His symptoms have since resolved      TCM Call (since 7/19/2019)     Date and time call was made  8/12/2019  9:48 AM Hospital care reviewed  Records reviewed    Patient was hospitialized at  [address]    Date of Admission  08/07/19    Date of discharge  08/10/19    Diagnosis  hypertensive urgency, vomiting     Disposition  Home    Were the patients medications reviewed and updated  Yes    Current Symptoms  None      TCM Call (since 7/19/2019)     Post hospital issues  None    Scheduled for follow up? Yes    Patients specialists  Other (comment)    Other specialists names  Helen Mckeon- Dr Jersey Laguna     Other specialists contcat #  [phone]    Did you obtain your prescribed medications  Yes    Do you need help managing your prescriptions or medications  No    Is transportation to your appointment needed  No    I have advised the patient to call PCP with any new or worsening symptoms    [facility] or [facility] other    Are you recieving any outpatient services  No    Are you recieving home care services  No    Are you using any community resources  No    Current waiver services  No    Have you fallen in the last 12 months  No    Interperter language line needed  No            Review of Systems   Constitutional: Negative for activity change, chills, fatigue and fever  HENT: Negative for congestion, ear pain, sinus pressure and sore throat  Eyes: Negative for redness, itching and visual disturbance  Respiratory: Negative for cough and shortness of breath  Cardiovascular: Negative for chest pain and palpitations  Gastrointestinal: Negative for abdominal pain, diarrhea and nausea  Endocrine: Negative for cold intolerance and heat intolerance  Genitourinary: Negative for dysuria, flank pain and frequency  Musculoskeletal: Negative for arthralgias, back pain, gait problem and myalgias  Skin: Negative for color change  Allergic/Immunologic: Negative for environmental allergies  Neurological: Negative for dizziness, numbness and headaches  Psychiatric/Behavioral: Negative for behavioral problems and sleep disturbance  The following portions of the patient's history were reviewed and updated as appropriate : past family history, past medical history, past social history and past surgical history  Current Outpatient Medications:     ALPRAZolam (XANAX) 0 5 mg tablet, Take 1 tablet (0 5 mg total) by mouth daily as needed for anxiety, Disp: 30 tablet, Rfl: 0    amLODIPine (NORVASC) 5 mg tablet, Take 1 tablet (5 mg total) by mouth daily, Disp: 30 tablet, Rfl: 0    famotidine (PEPCID) 20 mg tablet, Take 1 tablet (20 mg total) by mouth 2 (two) times a day, Disp: 30 tablet, Rfl: 0    levothyroxine 137 mcg tablet, Take 1 tablet (137 mcg total) by mouth daily, Disp: 90 tablet, Rfl: 1    lisinopril (ZESTRIL) 20 mg tablet, Take 1 tablet (20 mg total) by mouth daily, Disp: 30 tablet, Rfl: 0    metoclopramide (REGLAN) 10 mg tablet, Take 1 tablet (10 mg total) by mouth every 6 (six) hours, Disp: 30 tablet, Rfl: 0    ondansetron (ZOFRAN-ODT) 4 mg disintegrating tablet, Take 1 tablet (4 mg total) by mouth every 8 (eight) hours as needed for nausea or vomiting, Disp: 12 tablet, Rfl: 0    oxyCODONE (ROXICODONE) 10 MG TABS, Take 1 tablet (10 mg total) by mouth every 8 (eight) hours as needed for moderate painMax Daily Amount: 30 mg, Disp: 60 tablet, Rfl: 0    Objective:    Vitals:    08/19/19 1046   BP: 132/82   BP Location: Right arm   Patient Position: Sitting   Cuff Size: Large   Pulse: 76   Resp: 17   Temp: (!) 97 4 °F (36 3 °C)   TempSrc: Tympanic   SpO2: 97%   Weight: 95 4 kg (210 lb 6 4 oz)   Height: 5' 10" (1 778 m)        Physical Exam   Constitutional: He is oriented to person, place, and time  He appears well-developed and well-nourished  HENT:   Head: Normocephalic and atraumatic  Nose: Nose normal    Mouth/Throat: No oropharyngeal exudate  Eyes: Pupils are equal, round, and reactive to light  Right eye exhibits no discharge   Left eye exhibits no discharge  Neck: Normal range of motion  Neck supple  No tracheal deviation present  Cardiovascular: Normal rate, regular rhythm and intact distal pulses  Exam reveals no gallop and no friction rub  No murmur heard  Pulses:       Dorsalis pedis pulses are 2+ on the right side, and 2+ on the left side  Posterior tibial pulses are 2+ on the right side, and 2+ on the left side  Pulmonary/Chest: Effort normal and breath sounds normal  No respiratory distress  He has no wheezes  He has no rales  Abdominal: Soft  Bowel sounds are normal  He exhibits no distension  There is no tenderness  There is no rebound and no guarding  Musculoskeletal: Normal range of motion  He exhibits no edema  Lymphadenopathy:        Head (right side): No submental and no submandibular adenopathy present  Head (left side): No submental and no submandibular adenopathy present  He has no cervical adenopathy  Right cervical: No superficial cervical, no deep cervical and no posterior cervical adenopathy present  Left cervical: No superficial cervical, no deep cervical and no posterior cervical adenopathy present  Neurological: He is alert and oriented to person, place, and time  No cranial nerve deficit or sensory deficit  Skin: Skin is warm, dry and intact  Psychiatric: His speech is normal and behavior is normal  Judgment normal  His mood appears not anxious  Cognition and memory are normal  He does not exhibit a depressed mood  Vitals reviewed

## 2019-08-23 ENCOUNTER — APPOINTMENT (OUTPATIENT)
Dept: LAB | Facility: CLINIC | Age: 54
End: 2019-08-23
Payer: COMMERCIAL

## 2019-08-23 DIAGNOSIS — E87.6 HYPOKALEMIA: ICD-10-CM

## 2019-08-23 LAB
ANION GAP SERPL CALCULATED.3IONS-SCNC: 6 MMOL/L (ref 4–13)
BUN SERPL-MCNC: 10 MG/DL (ref 5–25)
CALCIUM SERPL-MCNC: 9.3 MG/DL (ref 8.3–10.1)
CHLORIDE SERPL-SCNC: 106 MMOL/L (ref 100–108)
CO2 SERPL-SCNC: 30 MMOL/L (ref 21–32)
CREAT SERPL-MCNC: 0.89 MG/DL (ref 0.6–1.3)
GFR SERPL CREATININE-BSD FRML MDRD: 96 ML/MIN/1.73SQ M
GLUCOSE SERPL-MCNC: 79 MG/DL (ref 65–140)
POTASSIUM SERPL-SCNC: 4.6 MMOL/L (ref 3.5–5.3)
SODIUM SERPL-SCNC: 142 MMOL/L (ref 136–145)

## 2019-08-23 PROCEDURE — 80048 BASIC METABOLIC PNL TOTAL CA: CPT

## 2019-08-23 PROCEDURE — 36415 COLL VENOUS BLD VENIPUNCTURE: CPT

## 2019-09-18 DIAGNOSIS — M54.12 CERVICAL RADICULOPATHY: ICD-10-CM

## 2019-09-19 RX ORDER — OXYCODONE HYDROCHLORIDE 10 MG/1
10 TABLET ORAL EVERY 8 HOURS PRN
Qty: 60 TABLET | Refills: 0 | Status: SHIPPED | OUTPATIENT
Start: 2019-09-19 | End: 2019-10-28 | Stop reason: SDUPTHER

## 2019-09-25 ENCOUNTER — APPOINTMENT (OUTPATIENT)
Dept: LAB | Facility: CLINIC | Age: 54
End: 2019-09-25
Payer: COMMERCIAL

## 2019-09-25 DIAGNOSIS — E78.2 MIXED HYPERLIPIDEMIA: ICD-10-CM

## 2019-09-25 DIAGNOSIS — E03.9 HYPOTHYROIDISM, UNSPECIFIED TYPE: ICD-10-CM

## 2019-09-25 LAB
CHOLEST SERPL-MCNC: 245 MG/DL (ref 50–200)
HDLC SERPL-MCNC: 61 MG/DL (ref 40–60)
LDLC SERPL CALC-MCNC: 162 MG/DL (ref 0–100)
TRIGL SERPL-MCNC: 110 MG/DL
TSH SERPL DL<=0.05 MIU/L-ACNC: 1.02 UIU/ML (ref 0.36–3.74)

## 2019-09-25 PROCEDURE — 36415 COLL VENOUS BLD VENIPUNCTURE: CPT

## 2019-09-25 PROCEDURE — 84443 ASSAY THYROID STIM HORMONE: CPT

## 2019-09-25 PROCEDURE — 80061 LIPID PANEL: CPT

## 2019-10-28 DIAGNOSIS — M54.12 CERVICAL RADICULOPATHY: ICD-10-CM

## 2019-10-29 RX ORDER — OXYCODONE HYDROCHLORIDE 10 MG/1
10 TABLET ORAL EVERY 8 HOURS PRN
Qty: 60 TABLET | Refills: 0 | Status: SHIPPED | OUTPATIENT
Start: 2019-10-29 | End: 2019-11-26 | Stop reason: SDUPTHER

## 2019-11-21 DIAGNOSIS — I16.0 HYPERTENSIVE URGENCY: ICD-10-CM

## 2019-11-21 RX ORDER — LISINOPRIL 20 MG/1
20 TABLET ORAL DAILY
Qty: 90 TABLET | Refills: 1 | Status: SHIPPED | OUTPATIENT
Start: 2019-11-21 | End: 2020-02-28 | Stop reason: SDUPTHER

## 2019-11-21 RX ORDER — AMLODIPINE BESYLATE 5 MG/1
5 TABLET ORAL DAILY
Qty: 90 TABLET | Refills: 1 | Status: SHIPPED | OUTPATIENT
Start: 2019-11-21 | End: 2020-02-28 | Stop reason: SDUPTHER

## 2019-11-26 DIAGNOSIS — M54.12 CERVICAL RADICULOPATHY: ICD-10-CM

## 2019-11-26 DIAGNOSIS — F40.243 FEAR OF FLYING: ICD-10-CM

## 2019-11-26 NOTE — TELEPHONE ENCOUNTER
Last oxycodone fill: 10/39/19 #60  Last Xanax fill: 8/12/19 #30  Last appt: 8/19/19  No future appt scheduled

## 2019-11-27 RX ORDER — OXYCODONE HYDROCHLORIDE 10 MG/1
10 TABLET ORAL EVERY 8 HOURS PRN
Qty: 60 TABLET | Refills: 0 | Status: SHIPPED | OUTPATIENT
Start: 2019-11-27 | End: 2019-12-30 | Stop reason: SDUPTHER

## 2019-11-27 RX ORDER — ALPRAZOLAM 0.5 MG/1
0.5 TABLET ORAL DAILY PRN
Qty: 30 TABLET | Refills: 0 | Status: SHIPPED | OUTPATIENT
Start: 2019-11-27 | End: 2019-12-30 | Stop reason: SDUPTHER

## 2019-12-30 DIAGNOSIS — F40.243 FEAR OF FLYING: ICD-10-CM

## 2019-12-30 DIAGNOSIS — M54.12 CERVICAL RADICULOPATHY: ICD-10-CM

## 2019-12-30 RX ORDER — ALPRAZOLAM 0.5 MG/1
0.5 TABLET ORAL DAILY PRN
Qty: 30 TABLET | Refills: 0 | Status: SHIPPED | OUTPATIENT
Start: 2019-12-30 | End: 2019-12-30 | Stop reason: SDUPTHER

## 2019-12-30 RX ORDER — OXYCODONE HYDROCHLORIDE 10 MG/1
10 TABLET ORAL EVERY 8 HOURS PRN
Qty: 60 TABLET | Refills: 0 | Status: SHIPPED | OUTPATIENT
Start: 2019-12-30 | End: 2019-12-30 | Stop reason: SDUPTHER

## 2019-12-30 RX ORDER — OXYCODONE HYDROCHLORIDE 10 MG/1
10 TABLET ORAL EVERY 8 HOURS PRN
Qty: 60 TABLET | Refills: 0 | Status: SHIPPED | OUTPATIENT
Start: 2019-12-30 | End: 2019-12-31 | Stop reason: SDUPTHER

## 2019-12-30 RX ORDER — ALPRAZOLAM 0.5 MG/1
0.5 TABLET ORAL DAILY PRN
Qty: 30 TABLET | Refills: 0 | Status: SHIPPED | OUTPATIENT
Start: 2019-12-30 | End: 2019-12-31 | Stop reason: SDUPTHER

## 2019-12-31 DIAGNOSIS — F40.243 FEAR OF FLYING: ICD-10-CM

## 2019-12-31 DIAGNOSIS — M54.12 CERVICAL RADICULOPATHY: ICD-10-CM

## 2019-12-31 NOTE — TELEPHONE ENCOUNTER
RX's for Oxycodone 10 mg #60 and Alprazolam 0 5 mg #30    Were called in yesterday to the wrong pharmacy    Please call them to [facility] in [place] and remove the other pharmacy from his acct      thanks

## 2020-01-01 RX ORDER — OXYCODONE HYDROCHLORIDE 10 MG/1
10 TABLET ORAL EVERY 8 HOURS PRN
Qty: 60 TABLET | Refills: 0 | Status: SHIPPED | OUTPATIENT
Start: 2020-01-01 | End: 2020-01-29 | Stop reason: SDUPTHER

## 2020-01-01 RX ORDER — ALPRAZOLAM 0.5 MG/1
0.5 TABLET ORAL DAILY PRN
Qty: 30 TABLET | Refills: 0 | Status: SHIPPED | OUTPATIENT
Start: 2020-01-01 | End: 2020-03-18

## 2020-01-13 DIAGNOSIS — E03.9 HYPOTHYROIDISM, UNSPECIFIED TYPE: ICD-10-CM

## 2020-01-13 RX ORDER — LEVOTHYROXINE SODIUM 137 UG/1
137 TABLET ORAL DAILY
Qty: 90 TABLET | Refills: 0 | Status: SHIPPED | OUTPATIENT
Start: 2020-01-13 | End: 2020-03-31 | Stop reason: SDUPTHER

## 2020-01-29 DIAGNOSIS — M54.12 CERVICAL RADICULOPATHY: ICD-10-CM

## 2020-01-29 RX ORDER — OXYCODONE HYDROCHLORIDE 10 MG/1
10 TABLET ORAL EVERY 8 HOURS PRN
Qty: 60 TABLET | Refills: 0 | Status: SHIPPED | OUTPATIENT
Start: 2020-01-29 | End: 2020-02-28 | Stop reason: SDUPTHER

## 2020-01-29 RX ORDER — OXYCODONE HYDROCHLORIDE 10 MG/1
10 TABLET ORAL EVERY 8 HOURS PRN
Qty: 60 TABLET | Refills: 0 | Status: SHIPPED | OUTPATIENT
Start: 2020-01-29 | End: 2020-01-29 | Stop reason: SDUPTHER

## 2020-01-29 NOTE — TELEPHONE ENCOUNTER
Pt wanted Rx to go to [facility] was sent to [facility]  Please resend

## 2020-02-28 DIAGNOSIS — I16.0 HYPERTENSIVE URGENCY: ICD-10-CM

## 2020-02-28 DIAGNOSIS — M54.12 CERVICAL RADICULOPATHY: ICD-10-CM

## 2020-02-28 RX ORDER — LISINOPRIL 20 MG/1
20 TABLET ORAL DAILY
Qty: 90 TABLET | Refills: 1 | Status: SHIPPED | OUTPATIENT
Start: 2020-02-28 | End: 2020-08-23

## 2020-02-28 RX ORDER — AMLODIPINE BESYLATE 5 MG/1
5 TABLET ORAL DAILY
Qty: 90 TABLET | Refills: 1 | Status: SHIPPED | OUTPATIENT
Start: 2020-02-28 | End: 2020-08-23

## 2020-02-28 RX ORDER — OXYCODONE HYDROCHLORIDE 10 MG/1
10 TABLET ORAL EVERY 8 HOURS PRN
Qty: 60 TABLET | Refills: 0 | Status: SHIPPED | OUTPATIENT
Start: 2020-02-28 | End: 2020-03-31 | Stop reason: SDUPTHER

## 2020-02-29 NOTE — TELEPHONE ENCOUNTER
Spoke with patient and he states that he has to call for jury duty next week  Doesn't know if he'll be picked and if he will be picked what dates they will have him on jury duty  Patient will call back to make an appointment early next week for a 6 month check up

## 2020-03-18 ENCOUNTER — OFFICE VISIT (OUTPATIENT)
Dept: FAMILY MEDICINE CLINIC | Facility: CLINIC | Age: 55
End: 2020-03-18
Payer: COMMERCIAL

## 2020-03-18 VITALS
HEART RATE: 80 BPM | DIASTOLIC BLOOD PRESSURE: 88 MMHG | OXYGEN SATURATION: 98 % | WEIGHT: 229.2 LBS | SYSTOLIC BLOOD PRESSURE: 126 MMHG | HEIGHT: 70 IN | RESPIRATION RATE: 16 BRPM | TEMPERATURE: 97.6 F | BODY MASS INDEX: 32.81 KG/M2

## 2020-03-18 DIAGNOSIS — E03.9 HYPOTHYROIDISM, UNSPECIFIED TYPE: ICD-10-CM

## 2020-03-18 DIAGNOSIS — F40.243 FEAR OF FLYING: ICD-10-CM

## 2020-03-18 DIAGNOSIS — M54.12 CERVICAL RADICULOPATHY: Primary | ICD-10-CM

## 2020-03-18 DIAGNOSIS — I16.0 HYPERTENSIVE URGENCY: ICD-10-CM

## 2020-03-18 DIAGNOSIS — E78.2 MIXED HYPERLIPIDEMIA: ICD-10-CM

## 2020-03-18 PROCEDURE — 3008F BODY MASS INDEX DOCD: CPT | Performed by: FAMILY MEDICINE

## 2020-03-18 PROCEDURE — 99214 OFFICE O/P EST MOD 30 MIN: CPT | Performed by: FAMILY MEDICINE

## 2020-03-18 PROCEDURE — 3079F DIAST BP 80-89 MM HG: CPT | Performed by: FAMILY MEDICINE

## 2020-03-18 PROCEDURE — 3074F SYST BP LT 130 MM HG: CPT | Performed by: FAMILY MEDICINE

## 2020-03-18 PROCEDURE — 1036F TOBACCO NON-USER: CPT | Performed by: FAMILY MEDICINE

## 2020-03-18 RX ORDER — DIAZEPAM 5 MG/1
5 TABLET ORAL DAILY PRN
Qty: 30 TABLET | Refills: 0 | Status: SHIPPED | OUTPATIENT
Start: 2020-03-18 | End: 2020-05-01 | Stop reason: SDUPTHER

## 2020-03-18 NOTE — PROGRESS NOTES
Assessment/Plan:   1  Cervical radiculopathy  Patient's symptoms appears stable today  At this time, he has had multiple interventions for cervical neck pain  He currently has been maintained on treatment with his oxycodone 10 mg t i d  p r n  [place] PDMP has not shown any abnormalities  Patient denies any sedated of side effects  Will continue with his current treatment  2  Hypertensive urgency  Patient's blood pressure appears much better controlled today  At this time, will continue with his current treatment of amlodipine as well as his lisinopril  Continue with a strict diet and exercise plan  - Comprehensive metabolic panel; Future    3  Fear of flying  Patient's anxiety appears persistent  At this time, he still intermittently states that he knees aid with his anxiety  He unfortunately has been developing significant adverse reactions with very low doses of alprazolam   Will switch him to diazepam for symptom relief  He was advised that he may take half a tablet of his 5 mg p r n  on a very limited basis  - diazepam (VALIUM) 5 mg tablet; Take 1 tablet (5 mg total) by mouth daily as needed for anxiety  Dispense: 30 tablet; Refill: 0    4  Hypothyroidism, unspecified type  Unclear precise control  Will recheck blood work  Will continue with his current dose of levothyroxine   - TSH, 3rd generation with Free T4 reflex; Future    5  Mixed hyperlipidemia  Unclear on precise control  At this time, patient was advised on importance of maintaining very strict low-fat/low-cholesterol diet  Recheck fasting lipid panel  If his lipid panel appears persistently elevated, will consider further treatment options  Follow-up in 6 months  - Lipid Panel with Direct LDL reflex; Future    BMI Counseling: Body mass index is 32 89 kg/m²   The BMI is above normal  Nutrition recommendations include decreasing portion sizes, encouraging healthy choices of fruits and vegetables, decreasing fast food intake, consuming healthier snacks and limiting drinks that contain sugar  Exercise recommendations include moderate physical activity 150 minutes/week and exercising 3-5 times per week  No pharmacotherapy was ordered  There are no diagnoses linked to this encounter  Subjective:       Chief Complaint   Patient presents with    Follow-up     6 month       Patient ID: Cortez Shahid is a 62 y o  male  Patient is a 57-year-old male presents today for a follow-up on his chronic conditions  He has chronic cervical radiculopathy, hypertension, anxiety disorder, dyslipidemia as well as hypothyroidism  He has been taking his medications regularly  He denies adverse reactions with the majority of his medications  He states that he has been using his alprazolam on a very limited basis for his anxiety  Unfortunately this medication has been causing significant drowsiness for patient at low doses  He would like to consider switching to an alternative treatment  Review of Systems   Constitutional: Negative for activity change, chills, fatigue and fever  HENT: Negative for congestion, ear pain, sinus pressure and sore throat  Eyes: Negative for redness, itching and visual disturbance  Respiratory: Negative for cough and shortness of breath  Cardiovascular: Negative for chest pain and palpitations  Gastrointestinal: Negative for abdominal pain, diarrhea and nausea  Endocrine: Negative for cold intolerance and heat intolerance  Genitourinary: Negative for dysuria, flank pain and frequency  Musculoskeletal: Negative for arthralgias, back pain, gait problem and myalgias  Skin: Negative for color change  Allergic/Immunologic: Negative for environmental allergies  Neurological: Negative for dizziness, numbness and headaches  Psychiatric/Behavioral: Negative for behavioral problems and sleep disturbance         The following portions of the patient's history were reviewed and updated as appropriate : past family history, past medical history, past social history and past surgical history  Current Outpatient Medications:     ALPRAZolam (XANAX) 0 5 mg tablet, Take 1 tablet (0 5 mg total) by mouth daily as needed for anxiety, Disp: 30 tablet, Rfl: 0    amLODIPine (NORVASC) 5 mg tablet, Take 1 tablet (5 mg total) by mouth daily, Disp: 90 tablet, Rfl: 1    famotidine (PEPCID) 20 mg tablet, Take 1 tablet (20 mg total) by mouth 2 (two) times a day, Disp: 30 tablet, Rfl: 0    levothyroxine 137 mcg tablet, Take 1 tablet (137 mcg total) by mouth daily, Disp: 90 tablet, Rfl: 0    lisinopril (ZESTRIL) 20 mg tablet, Take 1 tablet (20 mg total) by mouth daily, Disp: 90 tablet, Rfl: 1    metoclopramide (REGLAN) 10 mg tablet, Take 1 tablet (10 mg total) by mouth every 6 (six) hours, Disp: 30 tablet, Rfl: 0    ondansetron (ZOFRAN-ODT) 4 mg disintegrating tablet, Take 1 tablet (4 mg total) by mouth every 8 (eight) hours as needed for nausea or vomiting, Disp: 12 tablet, Rfl: 0    oxyCODONE (ROXICODONE) 10 MG TABS, Take 1 tablet (10 mg total) by mouth every 8 (eight) hours as needed for moderate painMax Daily Amount: 30 mg, Disp: 60 tablet, Rfl: 0         Objective:         Vitals:    03/18/20 0940   BP: 126/88   BP Location: Left arm   Patient Position: Sitting   Cuff Size: Large   Pulse: 80   Resp: 16   Temp: 97 6 °F (36 4 °C)   TempSrc: Tympanic   SpO2: 98%   Weight: 104 kg (229 lb 3 2 oz)   Height: 5' 10" (1 778 m)     Physical Exam   Constitutional: He is oriented to person, place, and time  He appears well-developed and well-nourished  HENT:   Head: Normocephalic and atraumatic  Nose: Nose normal    Mouth/Throat: No oropharyngeal exudate  Eyes: Pupils are equal, round, and reactive to light  Right eye exhibits no discharge  Left eye exhibits no discharge  Neck: Normal range of motion  Neck supple  No tracheal deviation present  Cardiovascular: Normal rate, regular rhythm and intact distal pulses   Exam reveals no gallop and no friction rub  No murmur heard  Pulses:       Dorsalis pedis pulses are 2+ on the right side, and 2+ on the left side  Posterior tibial pulses are 2+ on the right side, and 2+ on the left side  Pulmonary/Chest: Effort normal and breath sounds normal  No respiratory distress  He has no wheezes  He has no rales  Abdominal: Soft  Bowel sounds are normal  He exhibits no distension  There is no tenderness  There is no rebound and no guarding  Musculoskeletal: Normal range of motion  He exhibits no edema  Lymphadenopathy:        Head (right side): No submental and no submandibular adenopathy present  Head (left side): No submental and no submandibular adenopathy present  He has no cervical adenopathy  Right cervical: No superficial cervical, no deep cervical and no posterior cervical adenopathy present  Left cervical: No superficial cervical, no deep cervical and no posterior cervical adenopathy present  Neurological: He is alert and oriented to person, place, and time  No cranial nerve deficit or sensory deficit  Skin: Skin is warm, dry and intact  Psychiatric: His speech is normal and behavior is normal  Judgment normal  His mood appears not anxious  Cognition and memory are normal  He does not exhibit a depressed mood  Vitals reviewed

## 2020-03-31 DIAGNOSIS — M54.12 CERVICAL RADICULOPATHY: ICD-10-CM

## 2020-03-31 DIAGNOSIS — E03.9 HYPOTHYROIDISM, UNSPECIFIED TYPE: ICD-10-CM

## 2020-04-01 RX ORDER — LEVOTHYROXINE SODIUM 137 UG/1
137 TABLET ORAL DAILY
Qty: 90 TABLET | Refills: 1 | Status: SHIPPED | OUTPATIENT
Start: 2020-04-01 | End: 2020-04-01 | Stop reason: SDUPTHER

## 2020-04-01 RX ORDER — OXYCODONE HYDROCHLORIDE 10 MG/1
10 TABLET ORAL EVERY 8 HOURS PRN
Qty: 60 TABLET | Refills: 0 | Status: SHIPPED | OUTPATIENT
Start: 2020-04-01 | End: 2020-04-01 | Stop reason: SDUPTHER

## 2020-04-01 RX ORDER — OXYCODONE HYDROCHLORIDE 10 MG/1
10 TABLET ORAL EVERY 8 HOURS PRN
Qty: 60 TABLET | Refills: 0 | Status: SHIPPED | OUTPATIENT
Start: 2020-04-01 | End: 2020-05-01 | Stop reason: SDUPTHER

## 2020-04-01 RX ORDER — LEVOTHYROXINE SODIUM 137 UG/1
137 TABLET ORAL DAILY
Qty: 90 TABLET | Refills: 1 | Status: SHIPPED | OUTPATIENT
Start: 2020-04-01 | End: 2020-04-02 | Stop reason: SDUPTHER

## 2020-04-02 DIAGNOSIS — E03.9 HYPOTHYROIDISM, UNSPECIFIED TYPE: ICD-10-CM

## 2020-04-02 RX ORDER — LEVOTHYROXINE SODIUM 137 UG/1
137 TABLET ORAL DAILY
Qty: 90 TABLET | Refills: 1 | Status: SHIPPED | OUTPATIENT
Start: 2020-04-02 | End: 2020-09-21 | Stop reason: SDUPTHER

## 2020-05-01 DIAGNOSIS — F40.243 FEAR OF FLYING: ICD-10-CM

## 2020-05-01 DIAGNOSIS — M54.12 CERVICAL RADICULOPATHY: ICD-10-CM

## 2020-05-01 RX ORDER — OXYCODONE HYDROCHLORIDE 10 MG/1
10 TABLET ORAL EVERY 8 HOURS PRN
Qty: 60 TABLET | Refills: 0 | Status: SHIPPED | OUTPATIENT
Start: 2020-05-01 | End: 2020-05-27 | Stop reason: SDUPTHER

## 2020-05-01 RX ORDER — DIAZEPAM 5 MG/1
5 TABLET ORAL DAILY PRN
Qty: 30 TABLET | Refills: 0 | Status: SHIPPED | OUTPATIENT
Start: 2020-05-01 | End: 2020-05-27 | Stop reason: SDUPTHER

## 2020-05-27 DIAGNOSIS — F40.243 FEAR OF FLYING: ICD-10-CM

## 2020-05-27 DIAGNOSIS — M54.12 CERVICAL RADICULOPATHY: ICD-10-CM

## 2020-05-27 RX ORDER — OXYCODONE HYDROCHLORIDE 10 MG/1
10 TABLET ORAL EVERY 8 HOURS PRN
Qty: 60 TABLET | Refills: 0 | Status: SHIPPED | OUTPATIENT
Start: 2020-05-27 | End: 2020-07-08 | Stop reason: SDUPTHER

## 2020-05-27 RX ORDER — DIAZEPAM 5 MG/1
5 TABLET ORAL DAILY PRN
Qty: 30 TABLET | Refills: 0 | Status: SHIPPED | OUTPATIENT
Start: 2020-05-27 | End: 2020-07-08 | Stop reason: SDUPTHER

## 2020-06-29 DIAGNOSIS — E55.9 VITAMIN D DEFICIENCY: Primary | ICD-10-CM

## 2020-06-29 DIAGNOSIS — Z72.52 HIGH RISK HOMOSEXUAL BEHAVIOR: ICD-10-CM

## 2020-07-08 DIAGNOSIS — M54.12 CERVICAL RADICULOPATHY: ICD-10-CM

## 2020-07-08 DIAGNOSIS — F40.243 FEAR OF FLYING: ICD-10-CM

## 2020-07-08 RX ORDER — OXYCODONE HYDROCHLORIDE 10 MG/1
10 TABLET ORAL EVERY 8 HOURS PRN
Qty: 60 TABLET | Refills: 0 | Status: SHIPPED | OUTPATIENT
Start: 2020-07-08 | End: 2020-08-18 | Stop reason: SDUPTHER

## 2020-07-08 RX ORDER — DIAZEPAM 5 MG/1
5 TABLET ORAL DAILY PRN
Qty: 30 TABLET | Refills: 0 | Status: SHIPPED | OUTPATIENT
Start: 2020-07-08 | End: 2020-08-18 | Stop reason: SDUPTHER

## 2020-07-08 NOTE — TELEPHONE ENCOUNTER
PDMP checked, last filled Diazepam 5/30/2020  Oxycodone 5/27/2020    Last appt: 3/18/2020  Next appt: 9/21/2020

## 2020-08-18 DIAGNOSIS — F40.243 FEAR OF FLYING: ICD-10-CM

## 2020-08-18 DIAGNOSIS — M54.12 CERVICAL RADICULOPATHY: ICD-10-CM

## 2020-08-20 RX ORDER — DIAZEPAM 5 MG/1
5 TABLET ORAL DAILY PRN
Qty: 30 TABLET | Refills: 0 | Status: SHIPPED | OUTPATIENT
Start: 2020-08-20 | End: 2020-09-21 | Stop reason: SDUPTHER

## 2020-08-20 RX ORDER — OXYCODONE HYDROCHLORIDE 10 MG/1
10 TABLET ORAL EVERY 8 HOURS PRN
Qty: 60 TABLET | Refills: 0 | Status: SHIPPED | OUTPATIENT
Start: 2020-08-20 | End: 2020-09-21 | Stop reason: SDUPTHER

## 2020-08-23 DIAGNOSIS — I16.0 HYPERTENSIVE URGENCY: ICD-10-CM

## 2020-08-23 RX ORDER — AMLODIPINE BESYLATE 5 MG/1
TABLET ORAL
Qty: 90 TABLET | Refills: 1 | Status: SHIPPED | OUTPATIENT
Start: 2020-08-23 | End: 2021-02-19

## 2020-08-23 RX ORDER — LISINOPRIL 20 MG/1
TABLET ORAL
Qty: 90 TABLET | Refills: 1 | Status: SHIPPED | OUTPATIENT
Start: 2020-08-23 | End: 2021-02-19

## 2020-09-21 ENCOUNTER — OFFICE VISIT (OUTPATIENT)
Dept: FAMILY MEDICINE CLINIC | Facility: CLINIC | Age: 55
End: 2020-09-21
Payer: COMMERCIAL

## 2020-09-21 VITALS
DIASTOLIC BLOOD PRESSURE: 80 MMHG | SYSTOLIC BLOOD PRESSURE: 130 MMHG | OXYGEN SATURATION: 98 % | BODY MASS INDEX: 31.21 KG/M2 | WEIGHT: 218 LBS | TEMPERATURE: 98 F | HEART RATE: 83 BPM | HEIGHT: 70 IN | RESPIRATION RATE: 18 BRPM

## 2020-09-21 DIAGNOSIS — E55.9 VITAMIN D DEFICIENCY: ICD-10-CM

## 2020-09-21 DIAGNOSIS — Z72.52 HIGH RISK HOMOSEXUAL BEHAVIOR: ICD-10-CM

## 2020-09-21 DIAGNOSIS — R73.03 PREDIABETES: ICD-10-CM

## 2020-09-21 DIAGNOSIS — M54.12 CERVICAL RADICULOPATHY: ICD-10-CM

## 2020-09-21 DIAGNOSIS — E03.9 HYPOTHYROIDISM, UNSPECIFIED TYPE: ICD-10-CM

## 2020-09-21 DIAGNOSIS — E78.2 MIXED HYPERLIPIDEMIA: ICD-10-CM

## 2020-09-21 DIAGNOSIS — I10 BENIGN ESSENTIAL HYPERTENSION: Primary | ICD-10-CM

## 2020-09-21 DIAGNOSIS — F40.243 FEAR OF FLYING: ICD-10-CM

## 2020-09-21 PROBLEM — M50.20 DISPLACEMENT OF CERVICAL INTERVERTEBRAL DISC WITHOUT MYELOPATHY: Status: ACTIVE | Noted: 2020-09-21

## 2020-09-21 PROBLEM — M47.812 CERVICAL SPONDYLOSIS: Status: ACTIVE | Noted: 2020-09-21

## 2020-09-21 PROCEDURE — 99214 OFFICE O/P EST MOD 30 MIN: CPT | Performed by: FAMILY MEDICINE

## 2020-09-21 PROCEDURE — 1036F TOBACCO NON-USER: CPT | Performed by: FAMILY MEDICINE

## 2020-09-21 PROCEDURE — 3725F SCREEN DEPRESSION PERFORMED: CPT | Performed by: FAMILY MEDICINE

## 2020-09-21 PROCEDURE — 3079F DIAST BP 80-89 MM HG: CPT | Performed by: FAMILY MEDICINE

## 2020-09-21 RX ORDER — DIAZEPAM 5 MG/1
5 TABLET ORAL DAILY PRN
Qty: 60 TABLET | Refills: 0 | Status: SHIPPED | OUTPATIENT
Start: 2020-09-21 | End: 2020-12-02 | Stop reason: SDUPTHER

## 2020-09-21 RX ORDER — NALOXONE HYDROCHLORIDE 4 MG/.1ML
SPRAY NASAL
Qty: 1 EACH | Refills: 1 | Status: SHIPPED | OUTPATIENT
Start: 2020-09-21

## 2020-09-21 RX ORDER — LEVOTHYROXINE SODIUM 137 UG/1
137 TABLET ORAL DAILY
Qty: 90 TABLET | Refills: 1 | Status: SHIPPED | OUTPATIENT
Start: 2020-09-21 | End: 2021-03-22 | Stop reason: SDUPTHER

## 2020-09-21 RX ORDER — OXYCODONE HYDROCHLORIDE 10 MG/1
10 TABLET ORAL EVERY 8 HOURS PRN
Qty: 60 TABLET | Refills: 0 | Status: SHIPPED | OUTPATIENT
Start: 2020-09-21 | End: 2020-12-02 | Stop reason: SDUPTHER

## 2020-09-21 NOTE — PROGRESS NOTES
Assessment/Plan:   1  Benign essential hypertension    Patient's blood pressure appears stable today  Will continue with routine home monitoring  Continue as well with current treatment of amlodipine as well as his lisinopril  - Comprehensive metabolic panel; Future    2  Mixed hyperlipidemia    Unclear precise control  At this time, continue with a strict low-fat /low-cholesterol diet  Recheck fasting lipid panel  If elevated, will consider statin treatment  - Lipid Panel with Direct LDL reflex; Future    3  Hypothyroidism, unspecified type    Clinically asymptomatic  Recheck thyroid function testing  Continue with current dose of levothyroxine   - levothyroxine 137 mcg tablet; Take 1 tablet (137 mcg total) by mouth daily  Dispense: 90 tablet; Refill: 1  - TSH, 3rd generation with Free T4 reflex; Future    4  Cervical radiculopathy    Patient denies any recent exacerbations of his cervical radiculopathy  At this time, he has been taking his oxycodone for symptom relief  Will continue with his current treatment  Controlled Substance Review  [place] or [place]  reviewed: No red flags were identified; safe to proceed with prescription  [place] PDMP Review       Value Time User    PDMP Reviewed  Yes 9/21/2020 12:19 PM Mark Maxwell DO        - oxyCODONE (ROXICODONE) 10 MG TABS; Take 1 tablet (10 mg total) by mouth every 8 (eight) hours as needed for moderate painMax Daily Amount: 30 mg  Dispense: 60 tablet; Refill: 0  - naloxone (NARCAN) 4 mg/0 1 mL nasal spray; Administer 1 spray into a nostril  If breathing does not return to normal or if breathing difficulty resumes after 2-3 minutes, give another dose in the other nostril using a new spray  Dispense: 1 each; Refill: 1    5  Fear of flying    Patient states that his anxiety appears intermittent  At this time, his diazepam has been effective for him to take intermittently  He was given a refill on this medication  - diazepam (VALIUM) 5 mg tablet;  Take 1 tablet (5 mg total) by mouth daily as needed for anxiety  Dispense: 60 tablet; Refill: 0    6  Vitamin D deficiency    Patient's vitamin-D level appears unclear  He is asymptomatic  Continue with daily supplementation   - Vitamin D 25 hydroxy; Future    7  Prediabetes    Unclear precise control  Continue with a strict diet and exercise plan  Recheck A1c level  - Hemoglobin A1C; Future    8  High risk homosexual behavior    Patient in has been in a long-term homosexual relationship with his partner  He does have his HIV test completed every year  Orders were placed for him  Will call patient with his blood work results  Follow up with patient in 6 months   - HIV 1/2 Antigen/Antibody (4th Generation) w Reflex [facility]; Future           Diagnoses and all orders for this visit:    Hypothyroidism, unspecified type  -     levothyroxine 137 mcg tablet; Take 1 tablet (137 mcg total) by mouth daily    Cervical radiculopathy  -     oxyCODONE (ROXICODONE) 10 MG TABS; Take 1 tablet (10 mg total) by mouth every 8 (eight) hours as needed for moderate painMax Daily Amount: 30 mg    Fear of flying  -     diazepam (VALIUM) 5 mg tablet; Take 1 tablet (5 mg total) by mouth daily as needed for anxiety          Subjective:       Chief Complaint   Patient presents with    Follow-up     6 month       Patient ID: Jose Alberto Black is a 60 y o  male  Patient is a 57-year-old male presents today for follow-up on his chronic conditions  He has hypertension, dyslipidemia, hypothyroidism as well as anxiety disorder and cervical chronic degenerative disc disease with myelopathy  He has been taking his medications regularly  He denies any adverse reactions with medications  He has not had any recent blood work completed  Review of Systems   Constitutional: Negative for activity change, chills, fatigue and fever  HENT: Negative for congestion, ear pain, sinus pressure and sore throat      Eyes: Negative for redness, itching and visual disturbance  Respiratory: Negative for cough and shortness of breath  Cardiovascular: Negative for chest pain and palpitations  Gastrointestinal: Negative for abdominal pain, diarrhea and nausea  Endocrine: Negative for cold intolerance and heat intolerance  Genitourinary: Negative for dysuria, flank pain and frequency  Musculoskeletal: Negative for arthralgias, back pain, gait problem and myalgias  Skin: Negative for color change  Allergic/Immunologic: Negative for environmental allergies  Neurological: Negative for dizziness, numbness and headaches  Psychiatric/Behavioral: Negative for behavioral problems and sleep disturbance  The following portions of the patient's history were reviewed and updated as appropriate : past family history, past medical history, past social history and past surgical history      Current Outpatient Medications:     amLODIPine (NORVASC) 5 mg tablet, TAKE ONE TABLET BY MOUTH EVERY DAY, Disp: 90 tablet, Rfl: 1    famotidine (PEPCID) 20 mg tablet, Take 1 tablet (20 mg total) by mouth 2 (two) times a day, Disp: 30 tablet, Rfl: 0    levothyroxine 137 mcg tablet, Take 1 tablet (137 mcg total) by mouth daily, Disp: 90 tablet, Rfl: 1    lisinopril (ZESTRIL) 20 mg tablet, TAKE ONE TABLET BY MOUTH EVERY DAY, Disp: 90 tablet, Rfl: 1    metoclopramide (REGLAN) 10 mg tablet, Take 1 tablet (10 mg total) by mouth every 6 (six) hours, Disp: 30 tablet, Rfl: 0    ondansetron (ZOFRAN-ODT) 4 mg disintegrating tablet, Take 1 tablet (4 mg total) by mouth every 8 (eight) hours as needed for nausea or vomiting, Disp: 12 tablet, Rfl: 0    oxyCODONE (ROXICODONE) 10 MG TABS, Take 1 tablet (10 mg total) by mouth every 8 (eight) hours as needed for moderate painMax Daily Amount: 30 mg, Disp: 60 tablet, Rfl: 0    diazepam (VALIUM) 5 mg tablet, Take 1 tablet (5 mg total) by mouth daily as needed for anxiety, Disp: 30 tablet, Rfl: 0         Objective:         Vitals: 09/21/20 1146   BP: 130/80   BP Location: Left arm   Patient Position: Sitting   Cuff Size: Large   Pulse: 83   Resp: 18   Temp: 98 °F (36 7 °C)   TempSrc: Temporal   SpO2: 98%   Weight: 98 9 kg (218 lb)   Height: 5' 10" (1 778 m)     Physical Exam  Vitals signs reviewed  Constitutional:       Appearance: He is well-developed  HENT:      Head: Normocephalic and atraumatic  Nose: Nose normal       Mouth/Throat:      Pharynx: No oropharyngeal exudate  Eyes:      General: No scleral icterus  Right eye: No discharge  Left eye: No discharge  Pupils: Pupils are equal, round, and reactive to light  Neck:      Musculoskeletal: Normal range of motion and neck supple  Trachea: No tracheal deviation  Cardiovascular:      Rate and Rhythm: Normal rate and regular rhythm  Pulses:           Dorsalis pedis pulses are 2+ on the right side and 2+ on the left side  Posterior tibial pulses are 2+ on the right side and 2+ on the left side  Heart sounds: Normal heart sounds  No murmur  No friction rub  No gallop  Pulmonary:      Effort: Pulmonary effort is normal  No respiratory distress  Breath sounds: Normal breath sounds  No wheezing or rales  Abdominal:      General: Bowel sounds are normal  There is no distension  Palpations: Abdomen is soft  Tenderness: There is no abdominal tenderness  There is no guarding or rebound  Musculoskeletal: Normal range of motion  Lymphadenopathy:      Head:      Right side of head: No submental or submandibular adenopathy  Left side of head: No submental or submandibular adenopathy  Cervical: No cervical adenopathy  Right cervical: No superficial, deep or posterior cervical adenopathy  Left cervical: No superficial, deep or posterior cervical adenopathy  Skin:     General: Skin is warm and dry  Findings: No erythema     Neurological:      Mental Status: He is alert and oriented to person, place, and time  Cranial Nerves: No cranial nerve deficit  Sensory: No sensory deficit  Psychiatric:         Mood and Affect: Mood is not anxious or depressed  Speech: Speech normal          Behavior: Behavior normal          Thought Content:  Thought content normal          Judgment: Judgment normal

## 2020-12-02 DIAGNOSIS — F40.243 FEAR OF FLYING: ICD-10-CM

## 2020-12-02 DIAGNOSIS — M54.12 CERVICAL RADICULOPATHY: ICD-10-CM

## 2020-12-03 RX ORDER — OXYCODONE HYDROCHLORIDE 10 MG/1
10 TABLET ORAL EVERY 8 HOURS PRN
Qty: 60 TABLET | Refills: 0 | Status: SHIPPED | OUTPATIENT
Start: 2020-12-03 | End: 2021-01-27 | Stop reason: SDUPTHER

## 2020-12-03 RX ORDER — DIAZEPAM 5 MG/1
5 TABLET ORAL DAILY PRN
Qty: 60 TABLET | Refills: 0 | Status: SHIPPED | OUTPATIENT
Start: 2020-12-03 | End: 2021-03-22 | Stop reason: SDUPTHER

## 2021-01-27 DIAGNOSIS — M54.12 CERVICAL RADICULOPATHY: ICD-10-CM

## 2021-01-27 RX ORDER — OXYCODONE HYDROCHLORIDE 10 MG/1
10 TABLET ORAL EVERY 8 HOURS PRN
Qty: 60 TABLET | Refills: 0 | Status: SHIPPED | OUTPATIENT
Start: 2021-01-27 | End: 2021-03-22 | Stop reason: SDUPTHER

## 2021-02-12 ENCOUNTER — LAB (OUTPATIENT)
Dept: LAB | Facility: CLINIC | Age: 56
End: 2021-02-12
Payer: COMMERCIAL

## 2021-02-12 DIAGNOSIS — I10 BENIGN ESSENTIAL HYPERTENSION: ICD-10-CM

## 2021-02-12 DIAGNOSIS — R73.03 PREDIABETES: ICD-10-CM

## 2021-02-12 DIAGNOSIS — E55.9 VITAMIN D DEFICIENCY: ICD-10-CM

## 2021-02-12 DIAGNOSIS — Z72.52 HIGH RISK HOMOSEXUAL BEHAVIOR: ICD-10-CM

## 2021-02-12 DIAGNOSIS — E03.9 HYPOTHYROIDISM, UNSPECIFIED TYPE: ICD-10-CM

## 2021-02-12 DIAGNOSIS — E78.2 MIXED HYPERLIPIDEMIA: ICD-10-CM

## 2021-02-12 LAB
25(OH)D3 SERPL-MCNC: 27.9 NG/ML (ref 30–100)
ALBUMIN SERPL BCP-MCNC: 4.3 G/DL (ref 3.5–5)
ALP SERPL-CCNC: 80 U/L (ref 46–116)
ALT SERPL W P-5'-P-CCNC: 44 U/L (ref 12–78)
ANION GAP SERPL CALCULATED.3IONS-SCNC: 2 MMOL/L (ref 4–13)
AST SERPL W P-5'-P-CCNC: 29 U/L (ref 5–45)
BILIRUB SERPL-MCNC: 0.98 MG/DL (ref 0.2–1)
BUN SERPL-MCNC: 12 MG/DL (ref 5–25)
CALCIUM SERPL-MCNC: 10.1 MG/DL (ref 8.3–10.1)
CHLORIDE SERPL-SCNC: 104 MMOL/L (ref 100–108)
CHOLEST SERPL-MCNC: 288 MG/DL (ref 50–200)
CO2 SERPL-SCNC: 34 MMOL/L (ref 21–32)
CREAT SERPL-MCNC: 0.88 MG/DL (ref 0.6–1.3)
EST. AVERAGE GLUCOSE BLD GHB EST-MCNC: 100 MG/DL
GFR SERPL CREATININE-BSD FRML MDRD: 95 ML/MIN/1.73SQ M
GLUCOSE P FAST SERPL-MCNC: 105 MG/DL (ref 65–99)
HBA1C MFR BLD: 5.1 %
HDLC SERPL-MCNC: 65 MG/DL
LDLC SERPL CALC-MCNC: 192 MG/DL (ref 0–100)
POTASSIUM SERPL-SCNC: 4.5 MMOL/L (ref 3.5–5.3)
PROT SERPL-MCNC: 7.8 G/DL (ref 6.4–8.2)
SODIUM SERPL-SCNC: 140 MMOL/L (ref 136–145)
TRIGL SERPL-MCNC: 153 MG/DL
TSH SERPL DL<=0.05 MIU/L-ACNC: 1.2 UIU/ML (ref 0.36–3.74)

## 2021-02-12 PROCEDURE — 82306 VITAMIN D 25 HYDROXY: CPT

## 2021-02-12 PROCEDURE — 36415 COLL VENOUS BLD VENIPUNCTURE: CPT

## 2021-02-12 PROCEDURE — 84443 ASSAY THYROID STIM HORMONE: CPT

## 2021-02-12 PROCEDURE — 80061 LIPID PANEL: CPT

## 2021-02-12 PROCEDURE — 87389 HIV-1 AG W/HIV-1&-2 AB AG IA: CPT

## 2021-02-12 PROCEDURE — 80053 COMPREHEN METABOLIC PANEL: CPT

## 2021-02-12 PROCEDURE — 83036 HEMOGLOBIN GLYCOSYLATED A1C: CPT

## 2021-02-15 LAB — HIV 1+2 AB+HIV1 P24 AG SERPL QL IA: NORMAL

## 2021-02-18 DIAGNOSIS — I16.0 HYPERTENSIVE URGENCY: ICD-10-CM

## 2021-02-19 RX ORDER — LISINOPRIL 20 MG/1
TABLET ORAL
Qty: 90 TABLET | Refills: 1 | Status: SHIPPED | OUTPATIENT
Start: 2021-02-19 | End: 2021-05-24 | Stop reason: SDUPTHER

## 2021-02-19 RX ORDER — AMLODIPINE BESYLATE 5 MG/1
TABLET ORAL
Qty: 90 TABLET | Refills: 1 | Status: SHIPPED | OUTPATIENT
Start: 2021-02-19 | End: 2021-05-24 | Stop reason: SDUPTHER

## 2021-03-10 DIAGNOSIS — Z23 ENCOUNTER FOR IMMUNIZATION: ICD-10-CM

## 2021-03-22 ENCOUNTER — OFFICE VISIT (OUTPATIENT)
Dept: FAMILY MEDICINE CLINIC | Facility: CLINIC | Age: 56
End: 2021-03-22
Payer: COMMERCIAL

## 2021-03-22 VITALS
TEMPERATURE: 97.9 F | RESPIRATION RATE: 18 BRPM | HEART RATE: 80 BPM | HEIGHT: 70 IN | WEIGHT: 218 LBS | DIASTOLIC BLOOD PRESSURE: 86 MMHG | SYSTOLIC BLOOD PRESSURE: 130 MMHG | OXYGEN SATURATION: 97 % | BODY MASS INDEX: 31.21 KG/M2

## 2021-03-22 DIAGNOSIS — Z00.00 ANNUAL PHYSICAL EXAM: ICD-10-CM

## 2021-03-22 DIAGNOSIS — I10 BENIGN ESSENTIAL HYPERTENSION: Primary | ICD-10-CM

## 2021-03-22 DIAGNOSIS — E78.2 MIXED HYPERLIPIDEMIA: ICD-10-CM

## 2021-03-22 DIAGNOSIS — M54.12 CERVICAL RADICULOPATHY: ICD-10-CM

## 2021-03-22 DIAGNOSIS — E03.9 HYPOTHYROIDISM, UNSPECIFIED TYPE: ICD-10-CM

## 2021-03-22 DIAGNOSIS — F40.243 FEAR OF FLYING: ICD-10-CM

## 2021-03-22 PROCEDURE — 99214 OFFICE O/P EST MOD 30 MIN: CPT | Performed by: FAMILY MEDICINE

## 2021-03-22 PROCEDURE — 99396 PREV VISIT EST AGE 40-64: CPT | Performed by: FAMILY MEDICINE

## 2021-03-22 RX ORDER — DIAZEPAM 5 MG/1
5 TABLET ORAL DAILY PRN
Qty: 60 TABLET | Refills: 0 | Status: SHIPPED | OUTPATIENT
Start: 2021-03-22 | End: 2021-08-03 | Stop reason: SDUPTHER

## 2021-03-22 RX ORDER — LEVOTHYROXINE SODIUM 137 UG/1
137 TABLET ORAL DAILY
Qty: 90 TABLET | Refills: 1 | Status: SHIPPED | OUTPATIENT
Start: 2021-03-22 | End: 2021-05-24 | Stop reason: SDUPTHER

## 2021-03-22 RX ORDER — OXYCODONE HYDROCHLORIDE 10 MG/1
10 TABLET ORAL EVERY 8 HOURS PRN
Qty: 60 TABLET | Refills: 0 | Status: SHIPPED | OUTPATIENT
Start: 2021-03-22 | End: 2021-05-24 | Stop reason: SDUPTHER

## 2021-03-22 NOTE — PROGRESS NOTES
Assessment/Plan:   1  Benign essential hypertension    Patient's blood pressure appears stable  He has been monitoring his blood pressure at home and has been having stable ranges  Will continue at this time with his current treatment of amlodipine as well as his lisinopril  2  Cervical radiculopathy    Patient has chronic cervical radiculopathy  He has seen spinal specialist as well S had multiple evaluations for treatment  He has been taking his oxycodone regularly  He denies adverse reactions with medications such as sedation  PDMP does not show any abnormalities  Controlled Substance Review  [place] or [place]  reviewed: No red flags were identified; safe to proceed with prescription     - oxyCODONE (ROXICODONE) 10 MG TABS; Take 1 tablet (10 mg total) by mouth every 8 (eight) hours as needed for moderate painMax Daily Amount: 30 mg  Dispense: 60 tablet; Refill: 0    3  Fear of flying    Reviewed patient's symptoms today  At this time, he uses Valium only p r n  He pain mainly uses this for flying  Will continue with his current treatment of diazepam   - diazepam (VALIUM) 5 mg tablet; Take 1 tablet (5 mg total) by mouth daily as needed for anxiety  Dispense: 60 tablet; Refill: 0    4  Hypothyroidism, unspecified type  Thyroid function testing appears stable  At this time, continue with his current dose of levothyroxine   - levothyroxine 137 mcg tablet; Take 1 tablet (137 mcg total) by mouth daily  Dispense: 90 tablet; Refill: 1    5  Mixed hyperlipidemia    Fasting lipid panel appears stable  Continue with a strict low-fat / low-cholesterol diet  Diagnoses and all orders for this visit:    Benign essential hypertension    Cervical radiculopathy  -     oxyCODONE (ROXICODONE) 10 MG TABS; Take 1 tablet (10 mg total) by mouth every 8 (eight) hours as needed for moderate painMax Daily Amount: 30 mg    Fear of flying  -     diazepam (VALIUM) 5 mg tablet;  Take 1 tablet (5 mg total) by mouth daily as needed for anxiety    Hypothyroidism, unspecified type  -     levothyroxine 137 mcg tablet; Take 1 tablet (137 mcg total) by mouth daily    Annual physical exam    BMI 31 0-31 9,adult          Subjective:       Chief Complaint   Patient presents with    Follow-up     6 month, BW review     Physical Exam    Flu Vaccine     declined flu vaccine       Patient ID: Carmelina Silver is a 60 y o  male  Presents today for a follow-up on his chronic conditions  He has hypertension, cervical radiculopathy, anxiety disorder, hypothyroidism as well as dyslipidemia  He has been taking all his medications regularly  He denies adverse reactions with medications  HPI    Review of Systems   Constitutional: Negative for activity change, chills, fatigue and fever  HENT: Negative for congestion, ear pain, sinus pressure and sore throat  Eyes: Negative for redness, itching and visual disturbance  Respiratory: Negative for cough and shortness of breath  Cardiovascular: Negative for chest pain and palpitations  Gastrointestinal: Negative for abdominal pain, diarrhea and nausea  Endocrine: Negative for cold intolerance and heat intolerance  Genitourinary: Negative for dysuria, flank pain and frequency  Musculoskeletal: Negative for arthralgias, back pain, gait problem and myalgias  Skin: Negative for color change  Allergic/Immunologic: Negative for environmental allergies  Neurological: Negative for dizziness, numbness and headaches  Psychiatric/Behavioral: Negative for behavioral problems and sleep disturbance  The following portions of the patient's history were reviewed and updated as appropriate : past family history, past medical history, past social history and past surgical history      Current Outpatient Medications:     amLODIPine (NORVASC) 5 mg tablet, TAKE ONE TABLET BY MOUTH EVERY DAY, Disp: 90 tablet, Rfl: 1    famotidine (PEPCID) 20 mg tablet, Take 1 tablet (20 mg total) by mouth 2 (two) times a day, Disp: 30 tablet, Rfl: 0    levothyroxine 137 mcg tablet, Take 1 tablet (137 mcg total) by mouth daily, Disp: 90 tablet, Rfl: 1    lisinopril (ZESTRIL) 20 mg tablet, TAKE ONE TABLET BY MOUTH EVERY DAY, Disp: 90 tablet, Rfl: 1    metoclopramide (REGLAN) 10 mg tablet, Take 1 tablet (10 mg total) by mouth every 6 (six) hours, Disp: 30 tablet, Rfl: 0    naloxone (NARCAN) 4 mg/0 1 mL nasal spray, Administer 1 spray into a nostril  If breathing does not return to normal or if breathing difficulty resumes after 2-3 minutes, give another dose in the other nostril using a new spray , Disp: 1 each, Rfl: 1    ondansetron (ZOFRAN-ODT) 4 mg disintegrating tablet, Take 1 tablet (4 mg total) by mouth every 8 (eight) hours as needed for nausea or vomiting, Disp: 12 tablet, Rfl: 0    oxyCODONE (ROXICODONE) 10 MG TABS, Take 1 tablet (10 mg total) by mouth every 8 (eight) hours as needed for moderate painMax Daily Amount: 30 mg, Disp: 60 tablet, Rfl: 0    diazepam (VALIUM) 5 mg tablet, Take 1 tablet (5 mg total) by mouth daily as needed for anxiety, Disp: 60 tablet, Rfl: 0         Objective:         Vitals:    03/22/21 1133   BP: 130/86   BP Location: Left arm   Patient Position: Sitting   Cuff Size: Large   Pulse: 80   Resp: 18   Temp: 97 9 °F (36 6 °C)   TempSrc: Tympanic   SpO2: 97%   Weight: 98 9 kg (218 lb)   Height: 5' 10" (1 778 m)     Physical Exam  Vitals signs reviewed  Constitutional:       Appearance: He is well-developed  HENT:      Head: Normocephalic and atraumatic  Nose: Nose normal       Mouth/Throat:      Pharynx: No oropharyngeal exudate  Eyes:      General: No scleral icterus  Right eye: No discharge  Left eye: No discharge  Pupils: Pupils are equal, round, and reactive to light  Neck:      Musculoskeletal: Normal range of motion and neck supple  Trachea: No tracheal deviation     Cardiovascular:      Rate and Rhythm: Normal rate and regular rhythm  Pulses:           Dorsalis pedis pulses are 2+ on the right side and 2+ on the left side  Posterior tibial pulses are 2+ on the right side and 2+ on the left side  Heart sounds: Normal heart sounds  No murmur  No friction rub  No gallop  Pulmonary:      Effort: Pulmonary effort is normal  No respiratory distress  Breath sounds: Normal breath sounds  No wheezing or rales  Abdominal:      General: Bowel sounds are normal  There is no distension  Palpations: Abdomen is soft  Tenderness: There is no abdominal tenderness  There is no guarding or rebound  Musculoskeletal: Normal range of motion  Lymphadenopathy:      Head:      Right side of head: No submental or submandibular adenopathy  Left side of head: No submental or submandibular adenopathy  Cervical: No cervical adenopathy  Right cervical: No superficial, deep or posterior cervical adenopathy  Left cervical: No superficial, deep or posterior cervical adenopathy  Skin:     General: Skin is warm and dry  Findings: No erythema  Neurological:      Mental Status: He is alert and oriented to person, place, and time  Cranial Nerves: No cranial nerve deficit  Sensory: No sensory deficit  Psychiatric:         Mood and Affect: Mood is not anxious or depressed  Speech: Speech normal          Behavior: Behavior normal          Thought Content:  Thought content normal          Judgment: Judgment normal

## 2021-03-22 NOTE — PROGRESS NOTES
ADULT ANNUAL [address] GROUP    NAME: Jeri Kwong  AGE: 60 y o  SEX: male  : 1965     DATE: 3/22/2021     Assessment and Plan:     Problem List Items Addressed This Visit        Endocrine    Hypothyroidism       Other    Fear of flying      Other Visit Diagnoses     Benign essential hypertension    -  Primary    Cervical radiculopathy        Annual physical exam        BMI 31 0-31 9,adult              Immunizations and preventive care screenings were discussed with patient today  Appropriate education was printed on patient's after visit summary  Counseling:  Alcohol/drug use: discussed moderation in alcohol intake, the recommendations for healthy alcohol use, and avoidance of illicit drug use  Dental Health: discussed importance of regular tooth brushing, flossing, and dental visits  Injury prevention: discussed safety/seat belts, safety helmets, smoke detectors, carbon dioxide detectors, and smoking near bedding or upholstery  Sexual health: discussed sexually transmitted diseases, partner selection, use of condoms, avoidance of unintended pregnancy, and contraceptive alternatives  · Exercise: the importance of regular exercise/physical activity was discussed  Recommend exercise 3-5 times per week for at least 30 minutes  BMI Counseling: Body mass index is 31 28 kg/m²  The BMI is above normal  Nutrition recommendations include decreasing portion sizes, encouraging healthy choices of fruits and vegetables, decreasing fast food intake, consuming healthier snacks and limiting drinks that contain sugar  Exercise recommendations include moderate physical activity 150 minutes/week and exercising 3-5 times per week  No pharmacotherapy was ordered  Patient referred to PCP due to patient being overweight  Return in 6 months (on 2021)       Chief Complaint:     Chief Complaint   Patient presents with    Follow-up     6 month, BW review     Physical Exam    Flu Vaccine     declined flu vaccine       History of Present Illness:     Adult Annual Physical   Patient here for a comprehensive physical exam  The patient reports no problems  Diet and Physical Activity  · Diet/Nutrition: well balanced diet  · Exercise: walking  Depression Screening  PHQ-9 Depression Screening    PHQ-9:   Frequency of the following problems over the past two weeks:      Little interest or pleasure in doing things: 0 - not at all  Feeling down, depressed, or hopeless: 0 - not at all  PHQ-2 Score: 0       General Health  · Sleep: sleeps well  · Hearing: normal - bilateral   · Vision: no vision problems  · Dental: regular dental visits   Health  · Symptoms include: none     Review of Systems:     Review of Systems   Constitutional: Negative for activity change, chills, fatigue and fever  HENT: Negative for congestion, ear pain, sinus pressure and sore throat  Eyes: Negative for redness, itching and visual disturbance  Respiratory: Negative for cough and shortness of breath  Cardiovascular: Negative for chest pain and palpitations  Gastrointestinal: Negative for abdominal pain, diarrhea and nausea  Endocrine: Negative for cold intolerance and heat intolerance  Genitourinary: Negative for dysuria, flank pain and frequency  Musculoskeletal: Negative for arthralgias, back pain, gait problem and myalgias  Skin: Negative for color change  Allergic/Immunologic: Negative for environmental allergies  Neurological: Negative for dizziness, numbness and headaches  Psychiatric/Behavioral: Negative for behavioral problems and sleep disturbance        Past Medical History:     Past Medical History:   Diagnosis Date    Abnormal glucose     last assessed-12/5/2013    Acute infective pharyngitis     last assessed-5/19/2013    Disease of thyroid gland     Elevated blood pressure reading without diagnosis of hypertension last assessed-12/5/2013    Enlarged prostate     last assessed-12/2/2013    Exposure to STD     last assessed-3/30/2016    Hypertension     Injury of blood vessel at forearm level     last assessed-3/30/2013      Past Surgical History:     Past Surgical History:   Procedure Laterality Date    NO PAST SURGERIES        Family History:     Family History   Problem Relation Age of Onset    No Known Problems Mother     Kidney failure Father       Social History:        Social History     Socioeconomic History    Marital status: Significant Other     Spouse name: None    Number of children: None    Years of education: None    Highest education level: None   Occupational History    None   Social Needs    Financial resource strain: None    Food insecurity     Worry: None     Inability: None    Transportation needs     Medical: None     Non-medical: None   Tobacco Use    Smoking status: Never Smoker    Smokeless tobacco: Never Used   Substance and Sexual Activity    Alcohol use: Yes     Comment: socially    Drug use: No    Sexual activity: None     Comment: high risk sexual behavior   Lifestyle    Physical activity     Days per week: None     Minutes per session: None    Stress: None   Relationships    Social connections     Talks on phone: None     Gets together: None     Attends Mandaen service: None     Active member of club or organization: None     Attends meetings of clubs or organizations: None     Relationship status: None    Intimate partner violence     Fear of current or ex partner: None     Emotionally abused: None     Physically abused: None     Forced sexual activity: None   Other Topics Concern    None   Social History Narrative    None      Current Medications:     Current Outpatient Medications   Medication Sig Dispense Refill    amLODIPine (NORVASC) 5 mg tablet TAKE ONE TABLET BY MOUTH EVERY DAY 90 tablet 1    famotidine (PEPCID) 20 mg tablet Take 1 tablet (20 mg total) by mouth 2 (two) times a day 30 tablet 0    levothyroxine 137 mcg tablet Take 1 tablet (137 mcg total) by mouth daily 90 tablet 1    lisinopril (ZESTRIL) 20 mg tablet TAKE ONE TABLET BY MOUTH EVERY DAY 90 tablet 1    metoclopramide (REGLAN) 10 mg tablet Take 1 tablet (10 mg total) by mouth every 6 (six) hours 30 tablet 0    naloxone (NARCAN) 4 mg/0 1 mL nasal spray Administer 1 spray into a nostril  If breathing does not return to normal or if breathing difficulty resumes after 2-3 minutes, give another dose in the other nostril using a new spray  1 each 1    ondansetron (ZOFRAN-ODT) 4 mg disintegrating tablet Take 1 tablet (4 mg total) by mouth every 8 (eight) hours as needed for nausea or vomiting 12 tablet 0    oxyCODONE (ROXICODONE) 10 MG TABS Take 1 tablet (10 mg total) by mouth every 8 (eight) hours as needed for moderate painMax Daily Amount: 30 mg 60 tablet 0    diazepam (VALIUM) 5 mg tablet Take 1 tablet (5 mg total) by mouth daily as needed for anxiety 60 tablet 0     No current facility-administered medications for this visit  Allergies:     No Known Allergies   Physical Exam:     /86 (BP Location: Left arm, Patient Position: Sitting, Cuff Size: Large)   Pulse 80   Temp 97 9 °F (36 6 °C) (Tympanic)   Resp 18   Ht 5' 10" (1 778 m)   Wt 98 9 kg (218 lb)   SpO2 97%   BMI 31 28 kg/m²     Physical Exam  Vitals signs reviewed  Constitutional:       General: He is not in acute distress  Appearance: He is well-developed  He is not diaphoretic  HENT:      Head: Normocephalic and atraumatic  Mouth/Throat:      Pharynx: No oropharyngeal exudate  Eyes:      Conjunctiva/sclera: Conjunctivae normal       Pupils: Pupils are equal, round, and reactive to light  Neck:      Musculoskeletal: Normal range of motion and neck supple  Cardiovascular:      Rate and Rhythm: Normal rate and regular rhythm  Heart sounds: Normal heart sounds  No murmur  No friction rub  No gallop  Pulmonary:      Effort: Pulmonary effort is normal  No respiratory distress  Breath sounds: Normal breath sounds  No wheezing or rales  Abdominal:      General: Bowel sounds are normal  There is no distension  Palpations: Abdomen is soft  There is no mass  Tenderness: There is no abdominal tenderness  There is no guarding  Musculoskeletal: Normal range of motion  Skin:     General: Skin is warm and dry  Coloration: Skin is not pale  Findings: No erythema or rash  Neurological:      Mental Status: He is alert and oriented to person, place, and time  Cranial Nerves: No cranial nerve deficit        Coordination: Coordination normal           Ihab Hans, DO  [facility]

## 2021-03-22 NOTE — PATIENT INSTRUCTIONS

## 2021-05-24 DIAGNOSIS — M54.12 CERVICAL RADICULOPATHY: ICD-10-CM

## 2021-05-24 DIAGNOSIS — E03.9 HYPOTHYROIDISM, UNSPECIFIED TYPE: ICD-10-CM

## 2021-05-24 DIAGNOSIS — I16.0 HYPERTENSIVE URGENCY: ICD-10-CM

## 2021-05-24 RX ORDER — AMLODIPINE BESYLATE 5 MG/1
5 TABLET ORAL DAILY
Qty: 90 TABLET | Refills: 0 | Status: SHIPPED | OUTPATIENT
Start: 2021-05-24 | End: 2021-12-28 | Stop reason: SDUPTHER

## 2021-05-24 RX ORDER — LEVOTHYROXINE SODIUM 137 UG/1
137 TABLET ORAL DAILY
Qty: 90 TABLET | Refills: 0 | Status: SHIPPED | OUTPATIENT
Start: 2021-05-24 | End: 2021-09-15 | Stop reason: SDUPTHER

## 2021-05-24 RX ORDER — LISINOPRIL 20 MG/1
20 TABLET ORAL DAILY
Qty: 90 TABLET | Refills: 0 | Status: SHIPPED | OUTPATIENT
Start: 2021-05-24 | End: 2021-08-23 | Stop reason: SDUPTHER

## 2021-05-24 RX ORDER — OXYCODONE HYDROCHLORIDE 10 MG/1
10 TABLET ORAL EVERY 8 HOURS PRN
Qty: 60 TABLET | Refills: 0 | Status: SHIPPED | OUTPATIENT
Start: 2021-05-24 | End: 2021-08-03 | Stop reason: SDUPTHER

## 2021-05-24 NOTE — TELEPHONE ENCOUNTER
Pt called needs med refills is in [place] right now mother passed away working on estate     1)Lisinopril 20 mg tab  Take 1 tab daily  #90   [address] [place], [place]     Amlodipine 5 mg tab  Take 1 tab daily  #90   [id number] [place]       2)Oxycodone 10 mg tab  Take 1 tab by mouth every 8 hrs as needed for moderate pain  #60  [facility]   [address] [place], [place]    3)Levothyroxine 137 mcg tab  Take 1 tab daily  #90  [id number]   [facility] [place]    Pt is new to these pharmacies  Please give cell number to be contacted when ready

## 2021-05-27 ENCOUNTER — TELEPHONE (OUTPATIENT)
Dept: FAMILY MEDICINE CLINIC | Facility: CLINIC | Age: 56
End: 2021-05-27

## 2021-05-27 NOTE — TELEPHONE ENCOUNTER
VICKI Verduzco called to notify office there was an error at pharmacy  Pt was given to 90 day scripts for Amlodipine  So pt currently has a 6M supply on hand  Wanted to notify us pt will not be due for some time as a fyi

## 2021-08-03 DIAGNOSIS — M54.12 CERVICAL RADICULOPATHY: ICD-10-CM

## 2021-08-03 DIAGNOSIS — F40.243 FEAR OF FLYING: ICD-10-CM

## 2021-08-03 NOTE — TELEPHONE ENCOUNTER
Last ov 3/22/21 future f/u 9/27/21  PDM checked Oxycodone last filled 3/22/21 Q-60 via 20d supply R-0  Valium last filled 3/22/21 Q-60 via 30d supply R-0

## 2021-08-04 DIAGNOSIS — F40.243 FEAR OF FLYING: ICD-10-CM

## 2021-08-04 DIAGNOSIS — M54.12 CERVICAL RADICULOPATHY: ICD-10-CM

## 2021-08-04 RX ORDER — DIAZEPAM 5 MG/1
5 TABLET ORAL DAILY PRN
Qty: 60 TABLET | Refills: 0 | Status: SHIPPED | OUTPATIENT
Start: 2021-08-04 | End: 2021-08-05 | Stop reason: SDUPTHER

## 2021-08-04 RX ORDER — OXYCODONE HYDROCHLORIDE 10 MG/1
10 TABLET ORAL EVERY 8 HOURS PRN
Qty: 60 TABLET | Refills: 0 | Status: SHIPPED | OUTPATIENT
Start: 2021-08-04 | End: 2021-08-05 | Stop reason: SDUPTHER

## 2021-08-04 NOTE — TELEPHONE ENCOUNTER
Pharmacist confirming refill on   Rx: Oxycodone 10 mg #60 1 table ever 8 hour as needed for pain  DX: M54 12 given for diagnosis and ok to refill  Dr Warren BLAIRx

## 2021-08-05 NOTE — TELEPHONE ENCOUNTER
Patient called     His Oxycodone and valium rx's were called to [facility] and should have been called to [facility] in [facility] cancel [facility]'s and call to [facility]      Thank you        (he said he uses 3 different pharmacies dependent on price)

## 2021-08-06 RX ORDER — DIAZEPAM 5 MG/1
5 TABLET ORAL DAILY PRN
Qty: 60 TABLET | Refills: 0 | Status: SHIPPED | OUTPATIENT
Start: 2021-08-06 | End: 2021-11-08 | Stop reason: SDUPTHER

## 2021-08-06 RX ORDER — OXYCODONE HYDROCHLORIDE 10 MG/1
10 TABLET ORAL EVERY 8 HOURS PRN
Qty: 60 TABLET | Refills: 0 | Status: SHIPPED | OUTPATIENT
Start: 2021-08-06 | End: 2021-09-15 | Stop reason: SDUPTHER

## 2021-08-23 DIAGNOSIS — I16.0 HYPERTENSIVE URGENCY: ICD-10-CM

## 2021-08-23 RX ORDER — LISINOPRIL 20 MG/1
20 TABLET ORAL DAILY
Qty: 90 TABLET | Refills: 1 | Status: SHIPPED | OUTPATIENT
Start: 2021-08-23 | End: 2022-03-02 | Stop reason: SDUPTHER

## 2021-09-15 ENCOUNTER — TELEPHONE (OUTPATIENT)
Dept: FAMILY MEDICINE CLINIC | Facility: CLINIC | Age: 56
End: 2021-09-15

## 2021-09-15 DIAGNOSIS — M54.12 CERVICAL RADICULOPATHY: ICD-10-CM

## 2021-09-15 DIAGNOSIS — E03.9 HYPOTHYROIDISM, UNSPECIFIED TYPE: ICD-10-CM

## 2021-09-15 NOTE — TELEPHONE ENCOUNTER
Pt called is back in [place] til november but needs meds refilled    1)Oxycodone 10 mg tab  Take 1 tab by mouth every 8 hrs as needed for moderate pain  #60  [address] [facility]     2)Levothyroxine 137 mcg tab  Take 1 tab daily  #90  [facility] [address]

## 2021-09-15 NOTE — TELEPHONE ENCOUNTER
Last OV 3/22/21    Per PDMP Oxycodone last filled 8/6/21 #60    Next OV 11/8/21    Patient currently in [facility]

## 2021-09-16 RX ORDER — OXYCODONE HYDROCHLORIDE 10 MG/1
10 TABLET ORAL EVERY 8 HOURS PRN
Qty: 60 TABLET | Refills: 0 | Status: SHIPPED | OUTPATIENT
Start: 2021-09-16 | End: 2021-11-08 | Stop reason: SDUPTHER

## 2021-09-16 RX ORDER — LEVOTHYROXINE SODIUM 137 UG/1
137 TABLET ORAL DAILY
Qty: 90 TABLET | Refills: 0 | Status: SHIPPED | OUTPATIENT
Start: 2021-09-16 | End: 2021-12-15 | Stop reason: SDUPTHER

## 2021-10-14 ENCOUNTER — TELEPHONE (OUTPATIENT)
Dept: FAMILY MEDICINE CLINIC | Facility: CLINIC | Age: 56
End: 2021-10-14

## 2021-10-26 ENCOUNTER — TELEPHONE (OUTPATIENT)
Dept: FAMILY MEDICINE CLINIC | Facility: CLINIC | Age: 56
End: 2021-10-26

## 2021-11-08 ENCOUNTER — OFFICE VISIT (OUTPATIENT)
Dept: FAMILY MEDICINE CLINIC | Facility: CLINIC | Age: 56
End: 2021-11-08
Payer: COMMERCIAL

## 2021-11-08 VITALS
BODY MASS INDEX: 29.78 KG/M2 | HEART RATE: 88 BPM | SYSTOLIC BLOOD PRESSURE: 126 MMHG | DIASTOLIC BLOOD PRESSURE: 82 MMHG | OXYGEN SATURATION: 97 % | WEIGHT: 208 LBS | TEMPERATURE: 98.7 F | HEIGHT: 70 IN

## 2021-11-08 DIAGNOSIS — L82.0 INFLAMED SEBORRHEIC KERATOSIS: Primary | ICD-10-CM

## 2021-11-08 DIAGNOSIS — M54.12 CERVICAL RADICULOPATHY: ICD-10-CM

## 2021-11-08 DIAGNOSIS — F40.243 FEAR OF FLYING: ICD-10-CM

## 2021-11-08 PROCEDURE — 88305 TISSUE EXAM BY PATHOLOGIST: CPT | Performed by: PATHOLOGY

## 2021-11-08 PROCEDURE — 11300 SHAVE SKIN LESION 0.5 CM/<: CPT | Performed by: FAMILY MEDICINE

## 2021-11-08 PROCEDURE — 99214 OFFICE O/P EST MOD 30 MIN: CPT | Performed by: FAMILY MEDICINE

## 2021-11-08 RX ORDER — OXYCODONE HYDROCHLORIDE 10 MG/1
10 TABLET ORAL EVERY 8 HOURS PRN
Qty: 60 TABLET | Refills: 0 | Status: SHIPPED | OUTPATIENT
Start: 2021-11-08 | End: 2022-01-24 | Stop reason: SDUPTHER

## 2021-11-08 RX ORDER — DIAZEPAM 5 MG/1
5 TABLET ORAL DAILY PRN
Qty: 60 TABLET | Refills: 0 | Status: SHIPPED | OUTPATIENT
Start: 2021-11-08 | End: 2022-01-24 | Stop reason: SDUPTHER

## 2021-12-15 DIAGNOSIS — E03.9 HYPOTHYROIDISM, UNSPECIFIED TYPE: ICD-10-CM

## 2021-12-15 RX ORDER — LEVOTHYROXINE SODIUM 137 UG/1
137 TABLET ORAL DAILY
Qty: 90 TABLET | Refills: 0 | Status: SHIPPED | OUTPATIENT
Start: 2021-12-15 | End: 2022-03-11 | Stop reason: SDUPTHER

## 2021-12-28 DIAGNOSIS — I16.0 HYPERTENSIVE URGENCY: ICD-10-CM

## 2021-12-28 RX ORDER — AMLODIPINE BESYLATE 5 MG/1
5 TABLET ORAL DAILY
Qty: 90 TABLET | Refills: 1 | Status: SHIPPED | OUTPATIENT
Start: 2021-12-28 | End: 2022-03-29 | Stop reason: SDUPTHER

## 2022-01-24 DIAGNOSIS — F40.243 FEAR OF FLYING: ICD-10-CM

## 2022-01-24 DIAGNOSIS — M54.12 CERVICAL RADICULOPATHY: ICD-10-CM

## 2022-01-27 RX ORDER — OXYCODONE HYDROCHLORIDE 10 MG/1
10 TABLET ORAL EVERY 8 HOURS PRN
Qty: 60 TABLET | Refills: 0 | Status: SHIPPED | OUTPATIENT
Start: 2022-01-27 | End: 2022-03-11 | Stop reason: SDUPTHER

## 2022-01-27 RX ORDER — DIAZEPAM 5 MG/1
5 TABLET ORAL DAILY PRN
Qty: 60 TABLET | Refills: 0 | Status: SHIPPED | OUTPATIENT
Start: 2022-01-27 | End: 2022-05-01 | Stop reason: SDUPTHER

## 2022-03-02 DIAGNOSIS — I16.0 HYPERTENSIVE URGENCY: ICD-10-CM

## 2022-03-02 RX ORDER — LISINOPRIL 20 MG/1
20 TABLET ORAL DAILY
Qty: 90 TABLET | Refills: 0 | Status: SHIPPED | OUTPATIENT
Start: 2022-03-02 | End: 2022-03-29 | Stop reason: SDUPTHER

## 2022-03-09 ENCOUNTER — APPOINTMENT (EMERGENCY)
Dept: CT IMAGING | Facility: HOSPITAL | Age: 57
End: 2022-03-09
Payer: COMMERCIAL

## 2022-03-09 ENCOUNTER — HOSPITAL ENCOUNTER (EMERGENCY)
Facility: HOSPITAL | Age: 57
Discharge: HOME | End: 2022-03-09
Attending: EMERGENCY MEDICINE
Payer: COMMERCIAL

## 2022-03-09 ENCOUNTER — HOSPITAL ENCOUNTER (EMERGENCY)
Facility: HOSPITAL | Age: 57
Discharge: HOME | End: 2022-03-09
Attending: EMERGENCY MEDICINE | Admitting: EMERGENCY MEDICINE
Payer: COMMERCIAL

## 2022-03-09 VITALS
RESPIRATION RATE: 18 BRPM | SYSTOLIC BLOOD PRESSURE: 178 MMHG | HEART RATE: 68 BPM | OXYGEN SATURATION: 95 % | HEIGHT: 70 IN | TEMPERATURE: 98.2 F | DIASTOLIC BLOOD PRESSURE: 87 MMHG | BODY MASS INDEX: 29.84 KG/M2

## 2022-03-09 VITALS
OXYGEN SATURATION: 96 % | DIASTOLIC BLOOD PRESSURE: 78 MMHG | SYSTOLIC BLOOD PRESSURE: 139 MMHG | HEART RATE: 84 BPM | RESPIRATION RATE: 20 BRPM | TEMPERATURE: 98.6 F

## 2022-03-09 DIAGNOSIS — K29.70 GASTRITIS: ICD-10-CM

## 2022-03-09 DIAGNOSIS — R10.9 ABDOMINAL PAIN: Primary | ICD-10-CM

## 2022-03-09 DIAGNOSIS — R03.0 ELEVATED BLOOD PRESSURE READING: ICD-10-CM

## 2022-03-09 DIAGNOSIS — R10.9 ACUTE ABDOMINAL PAIN: Primary | ICD-10-CM

## 2022-03-09 LAB
ALBUMIN SERPL BCP-MCNC: 4.4 G/DL (ref 3.5–5)
ALBUMIN SERPL BCP-MCNC: 4.6 G/DL (ref 3.5–5)
ALP SERPL-CCNC: 78 U/L (ref 46–116)
ALP SERPL-CCNC: 80 U/L (ref 46–116)
ALT SERPL W P-5'-P-CCNC: 35 U/L (ref 12–78)
ALT SERPL W P-5'-P-CCNC: 36 U/L (ref 12–78)
ANION GAP SERPL CALCULATED.3IONS-SCNC: 14 MMOL/L (ref 4–13)
ANION GAP SERPL CALCULATED.3IONS-SCNC: 16 MMOL/L (ref 4–13)
AST SERPL W P-5'-P-CCNC: 23 U/L (ref 5–45)
AST SERPL W P-5'-P-CCNC: 28 U/L (ref 5–45)
ATRIAL RATE: 66 BPM
BACTERIA UR QL AUTO: ABNORMAL /HPF
BASOPHILS # BLD AUTO: 0.01 THOUSANDS/ΜL (ref 0–0.1)
BASOPHILS # BLD AUTO: 0.02 THOUSANDS/ΜL (ref 0–0.1)
BASOPHILS NFR BLD AUTO: 0 % (ref 0–1)
BASOPHILS NFR BLD AUTO: 0 % (ref 0–1)
BILIRUB SERPL-MCNC: 1.15 MG/DL (ref 0.2–1)
BILIRUB SERPL-MCNC: 1.19 MG/DL (ref 0.2–1)
BILIRUB UR QL STRIP: NEGATIVE
BUN SERPL-MCNC: 13 MG/DL (ref 5–25)
BUN SERPL-MCNC: 14 MG/DL (ref 5–25)
CALCIUM SERPL-MCNC: 9.1 MG/DL (ref 8.3–10.1)
CALCIUM SERPL-MCNC: 9.9 MG/DL (ref 8.3–10.1)
CARDIAC TROPONIN I PNL SERPL HS: 4 NG/L
CHLORIDE SERPL-SCNC: 100 MMOL/L (ref 100–108)
CHLORIDE SERPL-SCNC: 100 MMOL/L (ref 100–108)
CLARITY UR: CLEAR
CO2 SERPL-SCNC: 22 MMOL/L (ref 21–32)
CO2 SERPL-SCNC: 24 MMOL/L (ref 21–32)
COLOR UR: YELLOW
CREAT SERPL-MCNC: 0.85 MG/DL (ref 0.6–1.3)
CREAT SERPL-MCNC: 0.96 MG/DL (ref 0.6–1.3)
EOSINOPHIL # BLD AUTO: 0 THOUSAND/ΜL (ref 0–0.61)
EOSINOPHIL # BLD AUTO: 0 THOUSAND/ΜL (ref 0–0.61)
EOSINOPHIL NFR BLD AUTO: 0 % (ref 0–6)
EOSINOPHIL NFR BLD AUTO: 0 % (ref 0–6)
ERYTHROCYTE [DISTWIDTH] IN BLOOD BY AUTOMATED COUNT: 12 % (ref 11.6–15.1)
ERYTHROCYTE [DISTWIDTH] IN BLOOD BY AUTOMATED COUNT: 12.2 % (ref 11.6–15.1)
GFR SERPL CREATININE-BSD FRML MDRD: 86 ML/MIN/1.73SQ M
GFR SERPL CREATININE-BSD FRML MDRD: 96 ML/MIN/1.73SQ M
GLUCOSE SERPL-MCNC: 127 MG/DL (ref 65–140)
GLUCOSE SERPL-MCNC: 130 MG/DL (ref 65–140)
GLUCOSE UR STRIP-MCNC: NEGATIVE MG/DL
HCT VFR BLD AUTO: 40.8 % (ref 36.5–49.3)
HCT VFR BLD AUTO: 42 % (ref 36.5–49.3)
HGB BLD-MCNC: 15.2 G/DL (ref 12–17)
HGB BLD-MCNC: 15.4 G/DL (ref 12–17)
HGB UR QL STRIP.AUTO: ABNORMAL
IMM GRANULOCYTES # BLD AUTO: 0.03 THOUSAND/UL (ref 0–0.2)
IMM GRANULOCYTES # BLD AUTO: 0.04 THOUSAND/UL (ref 0–0.2)
IMM GRANULOCYTES NFR BLD AUTO: 0 % (ref 0–2)
IMM GRANULOCYTES NFR BLD AUTO: 1 % (ref 0–2)
KETONES UR STRIP-MCNC: ABNORMAL MG/DL
LACTATE SERPL-SCNC: 2 MMOL/L (ref 0.5–2)
LEUKOCYTE ESTERASE UR QL STRIP: NEGATIVE
LIPASE SERPL-CCNC: 71 U/L (ref 73–393)
LIPASE SERPL-CCNC: 73 U/L (ref 73–393)
LYMPHOCYTES # BLD AUTO: 0.83 THOUSANDS/ΜL (ref 0.6–4.47)
LYMPHOCYTES # BLD AUTO: 0.94 THOUSANDS/ΜL (ref 0.6–4.47)
LYMPHOCYTES NFR BLD AUTO: 10 % (ref 14–44)
LYMPHOCYTES NFR BLD AUTO: 10 % (ref 14–44)
MCH RBC QN AUTO: 33.9 PG (ref 26.8–34.3)
MCH RBC QN AUTO: 34.3 PG (ref 26.8–34.3)
MCHC RBC AUTO-ENTMCNC: 36.7 G/DL (ref 31.4–37.4)
MCHC RBC AUTO-ENTMCNC: 37.3 G/DL (ref 31.4–37.4)
MCV RBC AUTO: 91 FL (ref 82–98)
MCV RBC AUTO: 94 FL (ref 82–98)
MONOCYTES # BLD AUTO: 0.28 THOUSAND/ΜL (ref 0.17–1.22)
MONOCYTES # BLD AUTO: 0.29 THOUSAND/ΜL (ref 0.17–1.22)
MONOCYTES NFR BLD AUTO: 3 % (ref 4–12)
MONOCYTES NFR BLD AUTO: 4 % (ref 4–12)
NEUTROPHILS # BLD AUTO: 7.15 THOUSANDS/ΜL (ref 1.85–7.62)
NEUTROPHILS # BLD AUTO: 8.14 THOUSANDS/ΜL (ref 1.85–7.62)
NEUTS SEG NFR BLD AUTO: 85 % (ref 43–75)
NEUTS SEG NFR BLD AUTO: 87 % (ref 43–75)
NITRITE UR QL STRIP: NEGATIVE
NON-SQ EPI CELLS URNS QL MICRO: ABNORMAL /HPF
NRBC BLD AUTO-RTO: 0 /100 WBCS
NRBC BLD AUTO-RTO: 0 /100 WBCS
P AXIS: 63 DEGREES
PH UR STRIP.AUTO: 7 [PH] (ref 4.5–8)
PLATELET # BLD AUTO: 222 THOUSANDS/UL (ref 149–390)
PLATELET # BLD AUTO: 239 THOUSANDS/UL (ref 149–390)
PMV BLD AUTO: 10.2 FL (ref 8.9–12.7)
PMV BLD AUTO: 10.3 FL (ref 8.9–12.7)
POTASSIUM SERPL-SCNC: 3.5 MMOL/L (ref 3.5–5.3)
POTASSIUM SERPL-SCNC: 3.8 MMOL/L (ref 3.5–5.3)
PR INTERVAL: 140 MS
PROT SERPL-MCNC: 8.2 G/DL (ref 6.4–8.2)
PROT SERPL-MCNC: 8.4 G/DL (ref 6.4–8.2)
PROT UR STRIP-MCNC: NEGATIVE MG/DL
QRS AXIS: 55 DEGREES
QRSD INTERVAL: 68 MS
QT INTERVAL: 388 MS
QTC INTERVAL: 406 MS
RBC # BLD AUTO: 4.49 MILLION/UL (ref 3.88–5.62)
RBC # BLD AUTO: 4.49 MILLION/UL (ref 3.88–5.62)
RBC #/AREA URNS AUTO: ABNORMAL /HPF
SODIUM SERPL-SCNC: 138 MMOL/L (ref 136–145)
SODIUM SERPL-SCNC: 138 MMOL/L (ref 136–145)
SP GR UR STRIP.AUTO: 1.02 (ref 1–1.03)
T WAVE AXIS: 45 DEGREES
UROBILINOGEN UR QL STRIP.AUTO: 0.2 E.U./DL
VENTRICULAR RATE: 66 BPM
WBC # BLD AUTO: 8.32 THOUSAND/UL (ref 4.31–10.16)
WBC # BLD AUTO: 9.41 THOUSAND/UL (ref 4.31–10.16)
WBC #/AREA URNS AUTO: ABNORMAL /HPF

## 2022-03-09 PROCEDURE — G1004 CDSM NDSC: HCPCS

## 2022-03-09 PROCEDURE — 96374 THER/PROPH/DIAG INJ IV PUSH: CPT

## 2022-03-09 PROCEDURE — 36415 COLL VENOUS BLD VENIPUNCTURE: CPT | Performed by: EMERGENCY MEDICINE

## 2022-03-09 PROCEDURE — 93005 ELECTROCARDIOGRAM TRACING: CPT

## 2022-03-09 PROCEDURE — 83605 ASSAY OF LACTIC ACID: CPT | Performed by: EMERGENCY MEDICINE

## 2022-03-09 PROCEDURE — 83690 ASSAY OF LIPASE: CPT | Performed by: EMERGENCY MEDICINE

## 2022-03-09 PROCEDURE — 96361 HYDRATE IV INFUSION ADD-ON: CPT

## 2022-03-09 PROCEDURE — 99284 EMERGENCY DEPT VISIT MOD MDM: CPT

## 2022-03-09 PROCEDURE — 85025 COMPLETE CBC W/AUTO DIFF WBC: CPT | Performed by: EMERGENCY MEDICINE

## 2022-03-09 PROCEDURE — 80053 COMPREHEN METABOLIC PANEL: CPT | Performed by: EMERGENCY MEDICINE

## 2022-03-09 PROCEDURE — 96375 TX/PRO/DX INJ NEW DRUG ADDON: CPT

## 2022-03-09 PROCEDURE — 99284 EMERGENCY DEPT VISIT MOD MDM: CPT | Performed by: EMERGENCY MEDICINE

## 2022-03-09 PROCEDURE — 93010 ELECTROCARDIOGRAM REPORT: CPT | Performed by: INTERNAL MEDICINE

## 2022-03-09 PROCEDURE — 96360 HYDRATION IV INFUSION INIT: CPT

## 2022-03-09 PROCEDURE — 99285 EMERGENCY DEPT VISIT HI MDM: CPT

## 2022-03-09 PROCEDURE — 74177 CT ABD & PELVIS W/CONTRAST: CPT

## 2022-03-09 PROCEDURE — 84484 ASSAY OF TROPONIN QUANT: CPT | Performed by: EMERGENCY MEDICINE

## 2022-03-09 PROCEDURE — 81001 URINALYSIS AUTO W/SCOPE: CPT

## 2022-03-09 PROCEDURE — 96376 TX/PRO/DX INJ SAME DRUG ADON: CPT

## 2022-03-09 RX ORDER — SUCRALFATE 1 G/1
1 TABLET ORAL ONCE
Status: COMPLETED | OUTPATIENT
Start: 2022-03-09 | End: 2022-03-09

## 2022-03-09 RX ORDER — FAMOTIDINE 20 MG/1
20 TABLET, FILM COATED ORAL DAILY
Qty: 30 TABLET | Refills: 0 | Status: SHIPPED | OUTPATIENT
Start: 2022-03-09 | End: 2022-03-29 | Stop reason: SDUPTHER

## 2022-03-09 RX ORDER — LIDOCAINE HYDROCHLORIDE 20 MG/ML
15 SOLUTION OROPHARYNGEAL ONCE
Status: COMPLETED | OUTPATIENT
Start: 2022-03-09 | End: 2022-03-09

## 2022-03-09 RX ORDER — SUCRALFATE ORAL 1 G/10ML
1 SUSPENSION ORAL 4 TIMES DAILY
Qty: 420 ML | Refills: 0 | Status: SHIPPED | OUTPATIENT
Start: 2022-03-09 | End: 2022-03-16

## 2022-03-09 RX ORDER — ONDANSETRON 2 MG/ML
1 INJECTION INTRAMUSCULAR; INTRAVENOUS ONCE
Status: COMPLETED | OUTPATIENT
Start: 2022-03-09 | End: 2022-03-09

## 2022-03-09 RX ORDER — SUCRALFATE ORAL 1 G/10ML
1 SUSPENSION ORAL 4 TIMES DAILY
Qty: 420 ML | Refills: 0 | Status: SHIPPED | OUTPATIENT
Start: 2022-03-09 | End: 2022-03-09 | Stop reason: SDUPTHER

## 2022-03-09 RX ORDER — ONDANSETRON 2 MG/ML
4 INJECTION INTRAMUSCULAR; INTRAVENOUS ONCE
Status: COMPLETED | OUTPATIENT
Start: 2022-03-09 | End: 2022-03-09

## 2022-03-09 RX ORDER — FENTANYL CITRATE 50 UG/ML
50 INJECTION, SOLUTION INTRAMUSCULAR; INTRAVENOUS ONCE
Status: COMPLETED | OUTPATIENT
Start: 2022-03-09 | End: 2022-03-09

## 2022-03-09 RX ORDER — MAGNESIUM HYDROXIDE/ALUMINUM HYDROXICE/SIMETHICONE 120; 1200; 1200 MG/30ML; MG/30ML; MG/30ML
30 SUSPENSION ORAL ONCE
Status: COMPLETED | OUTPATIENT
Start: 2022-03-09 | End: 2022-03-09

## 2022-03-09 RX ORDER — OLANZAPINE 5 MG/1
10 TABLET, ORALLY DISINTEGRATING ORAL ONCE
Status: COMPLETED | OUTPATIENT
Start: 2022-03-09 | End: 2022-03-09

## 2022-03-09 RX ORDER — ONDANSETRON 4 MG/1
4 TABLET, FILM COATED ORAL EVERY 8 HOURS PRN
Qty: 7 TABLET | Refills: 0 | Status: SHIPPED | OUTPATIENT
Start: 2022-03-09 | End: 2022-03-09 | Stop reason: SDUPTHER

## 2022-03-09 RX ORDER — ONDANSETRON 4 MG/1
4 TABLET, FILM COATED ORAL EVERY 8 HOURS PRN
Qty: 7 TABLET | Refills: 0 | Status: SHIPPED | OUTPATIENT
Start: 2022-03-09 | End: 2022-03-29 | Stop reason: SDUPTHER

## 2022-03-09 RX ADMIN — ONDANSETRON 4 MG: 2 INJECTION INTRAMUSCULAR; INTRAVENOUS at 08:51

## 2022-03-09 RX ADMIN — LIDOCAINE HYDROCHLORIDE 15 ML: 20 SOLUTION ORAL; TOPICAL at 15:30

## 2022-03-09 RX ADMIN — FENTANYL CITRATE 50 MCG: 50 INJECTION, SOLUTION INTRAMUSCULAR; INTRAVENOUS at 08:53

## 2022-03-09 RX ADMIN — HYOSCYAMINE SULFATE 0.12 MG: 0.12 TABLET SUBLINGUAL at 16:40

## 2022-03-09 RX ADMIN — IOHEXOL 100 ML: 350 INJECTION, SOLUTION INTRAVENOUS at 10:05

## 2022-03-09 RX ADMIN — SUCRALFATE 1 G: 1 TABLET ORAL at 10:47

## 2022-03-09 RX ADMIN — FENTANYL CITRATE 50 MCG: 50 INJECTION, SOLUTION INTRAMUSCULAR; INTRAVENOUS at 09:48

## 2022-03-09 RX ADMIN — OLANZAPINE 10 MG: 5 TABLET, ORALLY DISINTEGRATING ORAL at 15:30

## 2022-03-09 RX ADMIN — SODIUM CHLORIDE 1000 ML: 9 INJECTION, SOLUTION INTRAVENOUS at 15:09

## 2022-03-09 RX ADMIN — ALUMINUM HYDROXIDE, MAGNESIUM HYDROXIDE, AND SIMETHICONE 30 ML: 200; 200; 20 SUSPENSION ORAL at 15:30

## 2022-03-09 RX ADMIN — SODIUM CHLORIDE 1000 ML: 0.9 INJECTION, SOLUTION INTRAVENOUS at 08:56

## 2022-03-09 NOTE — DISCHARGE INSTRUCTIONS
Patient Instructions: Today you were seen in the emergency department for abdominal pain  We reviewed your CT scan, EKG and your labs and determined that you would be able to be discharged  You should take pepcid once a day  You should follow up with GI (gastroenterology)  Please return to the emergency department if your symptoms get worse, you develop a fever, or you have any other symptoms we discussed today prior to discharge  Nice to meet you! Best of luck with everything! [facility]

## 2022-03-09 NOTE — ED PROVIDER NOTES
History  Chief Complaint   Patient presents with    GI Problem     seen earlier, diagnosed with gastritis, no relief of symptoms although did not try prescribed meds     59-year-old male presents for evaluation of intermittent abdominal pain and nausea since Monday  Patient was seen and evaluated  She here today for upper abdominal pain which she rates as severe, is constant, nonradiating that became suddenly worse after taking a pill  Associated with nausea  No vomiting, diarrhea, constipation fevers, chills  Patient denied taking medications he was prescribed  States the same pain that he was seen earlier for today Negative ct a/p earlier today, normal labs  History provided by:  Patient and medical records      Prior to Admission Medications   Prescriptions Last Dose Informant Patient Reported? Taking? amLODIPine (NORVASC) 5 mg tablet   No No   Sig: Take 1 tablet (5 mg total) by mouth daily   diazepam (VALIUM) 5 mg tablet   No No   Sig: Take 1 tablet (5 mg total) by mouth daily as needed for anxiety   famotidine (PEPCID) 20 mg tablet   No No   Sig: Take 1 tablet (20 mg total) by mouth daily   levothyroxine 137 mcg tablet   No No   Sig: Take 1 tablet (137 mcg total) by mouth daily   lisinopril (ZESTRIL) 20 mg tablet   No No   Sig: Take 1 tablet (20 mg total) by mouth daily   metoclopramide (REGLAN) 10 mg tablet   No No   Sig: Take 1 tablet (10 mg total) by mouth every 6 (six) hours   naloxone (NARCAN) 4 mg/0 1 mL nasal spray   No No   Sig: Administer 1 spray into a nostril  If breathing does not return to normal or if breathing difficulty resumes after 2-3 minutes, give another dose in the other nostril using a new spray     oxyCODONE (ROXICODONE) 10 MG TABS   No No   Sig: Take 1 tablet (10 mg total) by mouth every 8 (eight) hours as needed for moderate pain Max Daily Amount: 30 mg      Facility-Administered Medications: None       Past Medical History:   Diagnosis Date    Abnormal glucose     last assessed-12/5/2013    Acute infective pharyngitis     last assessed-5/19/2013    Disease of thyroid gland     Elevated blood pressure reading without diagnosis of hypertension     last assessed-12/5/2013    Enlarged prostate     last assessed-12/2/2013    Exposure to STD     last assessed-3/30/2016    Hypertension     Injury of blood vessel at forearm level     last assessed-3/30/2013       Past Surgical History:   Procedure Laterality Date    NO PAST SURGERIES         Family History   Problem Relation Age of Onset    No Known Problems Mother     Kidney failure Father      I have reviewed and agree with the history as documented  E-Cigarette/Vaping     E-Cigarette/Vaping Substances     Social History     Tobacco Use    Smoking status: Never Smoker    Smokeless tobacco: Never Used   Substance Use Topics    Alcohol use: Yes     Comment: socially    Drug use: No       Review of Systems   Constitutional: Negative for activity change, appetite change, fatigue and fever  HENT: Negative for congestion, dental problem, ear pain, rhinorrhea and sore throat  Eyes: Negative for pain and redness  Respiratory: Negative for chest tightness, shortness of breath and wheezing  Cardiovascular: Negative for chest pain and palpitations  Gastrointestinal: Positive for abdominal pain and nausea  Negative for blood in stool, constipation, diarrhea and vomiting  Endocrine: Negative for cold intolerance and heat intolerance  Genitourinary: Negative for dysuria, frequency and hematuria  Musculoskeletal: Negative for arthralgias and myalgias  Skin: Negative for color change, pallor and rash  Neurological: Negative for weakness and numbness  Hematological: Does not bruise/bleed easily  Psychiatric/Behavioral: Negative for agitation, hallucinations and suicidal ideas  Physical Exam  Physical Exam  Vitals and nursing note reviewed  Constitutional:       General: He is not in acute distress  Appearance: Normal appearance  He is well-developed  He is not ill-appearing  HENT:      Head: Normocephalic and atraumatic  Right Ear: External ear normal       Left Ear: External ear normal       Mouth/Throat:      Pharynx: No oropharyngeal exudate  Eyes:      General: No scleral icterus  Extraocular Movements: Extraocular movements intact  Neck:      Comments: No meningeal signs  Cardiovascular:      Rate and Rhythm: Normal rate and regular rhythm  Heart sounds: Normal heart sounds  Pulmonary:      Effort: Pulmonary effort is normal  No respiratory distress  Breath sounds: Normal breath sounds  No wheezing or rales  Chest:      Chest wall: No tenderness  Abdominal:      General: Bowel sounds are normal  There is no distension  Palpations: Abdomen is soft  There is no mass  Tenderness: There is no abdominal tenderness  There is no right CVA tenderness, left CVA tenderness, guarding or rebound  Hernia: No hernia is present  Comments: No cvat   Musculoskeletal:         General: Normal range of motion  Cervical back: Normal range of motion and neck supple  Lymphadenopathy:      Cervical: No cervical adenopathy  Skin:     Capillary Refill: Capillary refill takes less than 2 seconds  Coloration: Skin is not jaundiced  Findings: No erythema or rash  Comments: No edema   Neurological:      General: No focal deficit present  Mental Status: He is alert and oriented to person, place, and time  Cranial Nerves: No cranial nerve deficit     Psychiatric:         Behavior: Behavior normal          Vital Signs  ED Triage Vitals   Temperature Pulse Respirations Blood Pressure SpO2   03/09/22 1410 03/09/22 1410 03/09/22 1410 03/09/22 1410 03/09/22 1410   98 2 °F (36 8 °C) 62 20 (!) 202/101 98 %      Temp Source Heart Rate Source Patient Position - Orthostatic VS BP Location FiO2 (%)   03/09/22 1410 03/09/22 1534 03/09/22 1534 03/09/22 1534 --   Oral Monitor Sitting Right arm       Pain Score       03/09/22 1410       10 - Worst Possible Pain           Vitals:    03/09/22 1410 03/09/22 1534 03/09/22 1615   BP: (!) 202/101 (!) 200/95 (!) 178/87   Pulse: 62 60 68   Patient Position - Orthostatic VS:  Sitting Lying         Visual Acuity      ED Medications  Medications   sodium chloride 0 9 % bolus 1,000 mL (0 mL Intravenous Stopped 3/9/22 1613)   OLANZapine (ZyPREXA ZYDIS) dispersible tablet 10 mg (10 mg Oral Given 3/9/22 1530)   aluminum-magnesium hydroxide-simethicone (MYLANTA) oral suspension 30 mL (30 mL Oral Given 3/9/22 1530)   Lidocaine Viscous HCl (XYLOCAINE) 2 % mucosal solution 15 mL (15 mL Oral Given 3/9/22 1530)   hyoscyamine (LEVSIN/SL) SL tablet 0 125 mg (0 125 mg Sublingual Given 3/9/22 1640)       Diagnostic Studies  Results Reviewed     Procedure Component Value Units Date/Time    Urine Microscopic [[id number]]  (Abnormal) Collected: 03/09/22 1535    Lab Status: Final result Specimen: Urine, Clean Catch Updated: 03/09/22 1651     RBC, UA 1-2 /hpf      WBC, UA None Seen /hpf      Epithelial Cells Occasional /hpf      Bacteria, UA None Seen /hpf     Lactic acid [[id number]]  (Normal) Collected: 03/09/22 1505    Lab Status: Final result Specimen: Blood from Arm, Right Updated: 03/09/22 1547     LACTIC ACID 2 0 mmol/L     Narrative:      Result may be elevated if tourniquet was used during collection      Comprehensive metabolic panel [[id number]]  (Abnormal) Collected: 03/09/22 1505    Lab Status: Final result Specimen: Blood from Arm, Right Updated: 03/09/22 1542     Sodium 138 mmol/L      Potassium 3 8 mmol/L      Chloride 100 mmol/L      CO2 24 mmol/L      ANION GAP 14 mmol/L      BUN 13 mg/dL      Creatinine 0 96 mg/dL      Glucose 130 mg/dL      Calcium 9 1 mg/dL      AST 23 U/L      ALT 35 U/L      Alkaline Phosphatase 78 U/L      Total Protein 8 4 g/dL      Albumin 4 4 g/dL      Total Bilirubin 1 15 mg/dL      eGFR 86 ml/min/1 73sq m Narrative:      National Kid[employer] guidelines for Chronic Kidney Disease (CKD):     Stage 1 with normal or high GFR (GFR > 90 mL/min/1 73 square meters)    Stage 2 Mild CKD (GFR = 60-89 mL/min/1 73 square meters)    Stage 3A Moderate CKD (GFR = 45-59 mL/min/1 73 square meters)    Stage 3B Moderate CKD (GFR = 30-44 mL/min/1 73 square meters)    Stage 4 Severe CKD (GFR = 15-29 mL/min/1 73 square meters)    Stage 5 End Stage CKD (GFR <15 mL/min/1 73 square meters)  Note: GFR calculation is accurate only with a steady state creatinine    Lipase [[id number]]  (Abnormal) Collected: 03/09/22 1505    Lab Status: Final result Specimen: Blood from Arm, Right Updated: 03/09/22 1542     Lipase 71 u/L     Urine Macroscopic, POC [[id number]]  (Abnormal) Collected: 03/09/22 1535    Lab Status: Final result Specimen: Urine Updated: 03/09/22 1536     Color, UA Yellow     Clarity, UA Clear     pH, UA 7 0     Leukocytes, UA Negative     Nitrite, UA Negative     Protein, UA Negative mg/dl      Glucose, UA Negative mg/dl      Ketones, UA 80 (3+) mg/dl      Urobilinogen, UA 0 2 E U /dl      Bilirubin, UA Negative     Blood, UA Trace     Specific [place], UA 1 020    Narrative:      CLINITEK RESULT    CBC and differential [[id number]]  (Abnormal) Collected: 03/09/22 1505    Lab Status: Final result Specimen: Blood from Arm, Right Updated: 03/09/22 1515     WBC 8 32 Thousand/uL      RBC 4 49 Million/uL      Hemoglobin 15 4 g/dL      Hematocrit 42 0 %      MCV 94 fL      MCH 34 3 pg      MCHC 36 7 g/dL      RDW 12 0 %      MPV 10 3 fL      Platelets 442 Thousands/uL      nRBC 0 /100 WBCs      Neutrophils Relative 85 %      Immat GRANS % 1 %      Lymphocytes Relative 10 %      Monocytes Relative 4 %      Eosinophils Relative 0 %      Basophils Relative 0 %      Neutrophils Absolute 7 15 Thousands/µL      Immature Grans Absolute 0 04 Thousand/uL      Lymphocytes Absolute 0 83 Thousands/µL      Monocytes Absolute 0 29 Thousand/µL      Eosinophils Absolute 0 00 Thousand/µL      Basophils Absolute 0 01 Thousands/µL                  No orders to display              Procedures  Procedures         ED Course  ED Course as of 03/09/22 1752   Wed Mar 09, 2022   1621 Labs stable  Abd exam bening, will reassure, , dc to home, add carafe                               SBIRT 22yo+      Most Recent Value   SBIRT (22 yo +)    In order to provide better care to our patients, we are screening all of our patients for alcohol and drug use  Would it be okay to ask you these screening questions? Unable to answer at this time Filed at: 03/09/2022 1536                    MDM  Number of Diagnoses or Management Options  Acute abdominal pain  Elevated blood pressure reading  Diagnosis management comments: Upper abdominal pain with benign exam with a negative workup earlier today  Will do repeat labs, treat symptoms, reassess  Disposition  Final diagnoses:   Acute abdominal pain   Elevated blood pressure reading     Time reflects when diagnosis was documented in both MDM as applicable and the Disposition within this note     Time User Action Codes Description Comment    3/9/2022  4:22 PM Bk Medico Add [R10 9] Acute abdominal pain     3/9/2022  4:23 PM Ayse Hatch Add [R03 0] Elevated blood pressure reading       ED Disposition     ED Disposition Condition Date/Time Comment    Discharge Stable Wed Mar 9, 2022  4:22 PM [address] discharge to home/self care              Follow-up Information     Follow up With Specialties Details Why Contact Loyda Mchugh DO Family Medicine Schedule an appointment as soon as possible for a visit in 2 days  [address] Me[address]ia [phone] [facility]  [phone]            Discharge Medication List as of 3/9/2022  4:23 PM      START taking these medications    Details   ondansetron (ZOFRAN) 4 mg tablet Take 1 tablet (4 mg total) by mouth every 8 (eight) hours as needed for nausea or vomiting for up to 7 doses, Starting Wed 3/9/2022, Normal      sucralfate (CARAFATE) 1 g/10 mL suspension Take 10 mL (1 g total) by mouth 4 (four) times a day for 7 days, Starting Wed 3/9/2022, Until Wed 3/16/2022, Normal         CONTINUE these medications which have NOT CHANGED    Details   amLODIPine (NORVASC) 5 mg tablet Take 1 tablet (5 mg total) by mouth daily, Starting Tue 12/28/2021, Normal      diazepam (VALIUM) 5 mg tablet Take 1 tablet (5 mg total) by mouth daily as needed for anxiety, Starting Thu 1/27/2022, Until Wed 3/9/2022 at 2359, Normal      famotidine (PEPCID) 20 mg tablet Take 1 tablet (20 mg total) by mouth daily, Starting Wed 3/9/2022, Normal      levothyroxine 137 mcg tablet Take 1 tablet (137 mcg total) by mouth daily, Starting Wed 12/15/2021, Normal      lisinopril (ZESTRIL) 20 mg tablet Take 1 tablet (20 mg total) by mouth daily, Starting Wed 3/2/2022, Normal      metoclopramide (REGLAN) 10 mg tablet Take 1 tablet (10 mg total) by mouth every 6 (six) hours, Starting Sun 6/23/2019, Print      naloxone (NARCAN) 4 mg/0 1 mL nasal spray Administer 1 spray into a nostril  If breathing does not return to normal or if breathing difficulty resumes after 2-3 minutes, give another dose in the other nostril using a new spray , Normal      oxyCODONE (ROXICODONE) 10 MG TABS Take 1 tablet (10 mg total) by mouth every 8 (eight) hours as needed for moderate pain Max Daily Amount: 30 mg, Starting u 1/27/2022, Normal      ondansetron (ZOFRAN-ODT) 4 mg disintegrating tablet Take 1 tablet (4 mg total) by mouth every 8 (eight) hours as needed for nausea or vomiting, Starting Sun 6/23/2019, Print             No discharge procedures on file      PDMP Review       Value Time User    PDMP Reviewed  Yes 11/8/2021  5:08 PM Taryn Walker DO ED Provider  Electronically Signed by           Roberta Wheeler MD  03/09/22 0734

## 2022-03-09 NOTE — ED PROVIDER NOTES
Final Diagnosis:  1  Abdominal pain    2  Gastritis      ED Course as of 03/09/22 1330   Wed Mar 09, 2022   9927 Procedure Note: EKG  Date/Time: 03/09/22 9:14 AM   Interpreted by: Rajwinder Purvis  Indications / Diagnosis: epigastric pain  ECG reviewed by me, the ED Provider: yes   The EKG demonstrates:  Rhythm: sinus, sinus arrhythmia  Intervals: normal intervals  Axis: normal axis  QRS/Blocks: normal QRS  ST Changes: No acute ST Changes, no STD/SABAS  Chief Complaint   Patient presents with    Vomiting     patient reports 5 episodes of emesis PTA  Patient was able to eat and drink regularly yesterday but reports emesis Monday with 12 episodes  Zofran pre hosp  with decreased vomiting  Patient reports abdominal pain that is an eight  Denies chest pain, SOB  ASSESSMENT + PLAN:   - Nursing note reviewed  1  Abdominal pain  -epigastric, patient does have history of marijuana use, similar presentation few years ago  -IV antibiotics, IV pain control, IV fluids  -EKG and troponin given nausea vomiting as possible atypical chest pain although patient denies any chest pain or shortness of breath  -CT abdomen pelvis  -Labs normal, CT A+P wnl  -Seems consistent with gastritis, started on pepcid    Final Dispo   Patient was reassessed  Vital signs stable  Patient and/or family given discharge instructions and return precautions  Patient and/or family was reassured  The patient and/or family vocalizes understanding  Answered all of the patient's and/or family's questions  Will follow up with GI  Patient and/or family are agreeable to the plan          Medications   ondansetron (FOR EMS ONLY) (ZOFRAN) 4 mg/2 mL injection 4 mg (0 mg Does not apply Given to EMS 3/9/22 0832)   fentanyl citrate (PF) 100 MCG/2ML 50 mcg (50 mcg Intravenous Given 3/9/22 0853)   ondansetron (ZOFRAN) injection 4 mg (4 mg Intravenous Given 3/9/22 0851)   sodium chloride 0 9 % bolus 1,000 mL (0 mL Intravenous Stopped 3/9/22 1119)   fentanyl citrate (PF) 100 MCG/2ML 50 mcg (50 mcg Intravenous Given 3/9/22 0955)   iohexol (OMNIPAQUE) 350 MG/ML injection (SINGLE-DOSE) 100 mL (100 mL Intravenous Given 3/9/22 1005)   sucralfate (CARAFATE) tablet 1 g (1 g Oral Given 3/9/22 1047)     Time reflects when diagnosis was documented in both MDM as applicable and the Disposition within this note     Time User Action Codes Description Comment    3/9/2022 10:53 AM [facility] [R10 9] Abdominal pain     3/9/2022 10:53 AM [facility] Add [K29 70] Gastritis       ED Disposition     ED Disposition Condition Date/Time Comment    Discharge Stable Wed Mar 9, 2022 10:53 AM Anselmo Fierro discharge to home/self care              Follow-up Information     Follow up With Specialties Details Why Contact Info Additional Alok [facility]   [address]  [facility] [address] [id number]  Alee Gordon [address] [facility] [place], [address], [address], [facility] [place], [address] [phone]        Discharge Medication List as of 3/9/2022 10:54 AM      START taking these medications    Details   famotidine (PEPCID) 20 mg tablet Take 1 tablet (20 mg total) by mouth daily, Starting Wed 3/9/2022, Normal         CONTINUE these medications which have NOT CHANGED    Details   amLODIPine (NORVASC) 5 mg tablet Take 1 tablet (5 mg total) by mouth daily, Starting Tue 12/28/2021, Normal      diazepam (VALIUM) 5 mg tablet Take 1 tablet (5 mg total) by mouth daily as needed for anxiety, Starting Thu 1/27/2022, Until Wed 3/9/2022 at 2359, Normal      levothyroxine 137 mcg tablet Take 1 tablet (137 mcg total) by mouth daily, Starting Wed 12/15/2021, Normal      lisinopril (ZESTRIL) 20 mg tablet Take 1 tablet (20 mg total) by mouth daily, Starting Wed 3/2/2022, Normal      metoclopramide (REGLAN) 10 mg tablet Take 1 tablet (10 mg total) by mouth every 6 (six) hours, Starting Sun 6/23/2019, Print      naloxone (NARCAN) 4 mg/0 1 mL nasal spray Administer 1 spray into a nostril  If breathing does not return to normal or if breathing difficulty resumes after 2-3 minutes, give another dose in the other nostril using a new spray , Normal      ondansetron (ZOFRAN-ODT) 4 mg disintegrating tablet Take 1 tablet (4 mg total) by mouth every 8 (eight) hours as needed for nausea or vomiting, Starting Sun 6/23/2019, Print      oxyCODONE (ROXICODONE) 10 MG TABS Take 1 tablet (10 mg total) by mouth every 8 (eight) hours as needed for moderate pain Max Daily Amount: 30 mg, Starting Thu 1/27/2022, Normal           No discharge procedures on file  Prior to Admission Medications   Prescriptions Last Dose Informant Patient Reported? Taking? amLODIPine (NORVASC) 5 mg tablet   No Yes   Sig: Take 1 tablet (5 mg total) by mouth daily   diazepam (VALIUM) 5 mg tablet   No Yes   Sig: Take 1 tablet (5 mg total) by mouth daily as needed for anxiety   levothyroxine 137 mcg tablet   No Yes   Sig: Take 1 tablet (137 mcg total) by mouth daily   lisinopril (ZESTRIL) 20 mg tablet   No Yes   Sig: Take 1 tablet (20 mg total) by mouth daily   metoclopramide (REGLAN) 10 mg tablet   No Yes   Sig: Take 1 tablet (10 mg total) by mouth every 6 (six) hours   naloxone (NARCAN) 4 mg/0 1 mL nasal spray   No Yes   Sig: Administer 1 spray into a nostril  If breathing does not return to normal or if breathing difficulty resumes after 2-3 minutes, give another dose in the other nostril using a new spray  ondansetron (ZOFRAN-ODT) 4 mg disintegrating tablet   No Yes   Sig: Take 1 tablet (4 mg total) by mouth every 8 (eight) hours as needed for nausea or vomiting   oxyCODONE (ROXICODONE) 10 MG TABS   No Yes   Sig: Take 1 tablet (10 mg total) by mouth every 8 (eight) hours as needed for moderate pain Max Daily Amount: 30 mg      Facility-Administered Medications: None       History of Present Illness:    This is a 60 y o  male presenting today for evaluation of abdominal pain  Patient has some associated nausea and vomiting  He he says that his epigastric pain does not go anywhere  He does have a history of daily alcohol use  Patient also says that he uses marijuana, but has not used it in about a week  He says that he has been unable to eat or drink  He says that he had 5-6 episodes of vomiting today  No hematemesis or coffee-ground emesis  He also had similar episode on Monday and had 10-12 episodes of vomiting  Tuesday he was totally normal but then last night after drinking 1 glass of  has been sick since  - No language barrier    - History obtained from patient and chart   - There are no limitations to the history obtained  - Previous charting was reviewed  Some data reviewed included below for ease of access whether or not it is relevant to this patient encounter  Past Medical, Past Surgical History:    has a past medical history of Abnormal glucose, Acute infective pharyngitis, Disease of thyroid gland, Elevated blood pressure reading without diagnosis of hypertension, Enlarged prostate, Exposure to STD, Hypertension, and Injury of blood vessel at forearm level    has a past surgical history that includes No past surgeries  Allergies:   No Known Allergies    Social and Family History:     Social History     Substance and Sexual Activity   Alcohol Use Yes    Comment: socially     Social History     Tobacco Use   Smoking Status Never Smoker   Smokeless Tobacco Never Used     Social History     Substance and Sexual Activity   Drug Use No       Review of Systems:   Review of Systems   Constitutional: Negative for chills, diaphoresis and fever  HENT: Negative  Eyes: Negative  Negative for visual disturbance  Respiratory: Negative  Negative for shortness of breath  Cardiovascular: Negative  Negative for chest pain  Gastrointestinal: Positive for abdominal pain, nausea and vomiting  Endocrine: Negative  Genitourinary: Negative  Musculoskeletal: Negative  Negative for myalgias  Skin: Negative  Negative for rash  Allergic/Immunologic: Negative  Neurological: Negative  Negative for weakness, light-headedness, numbness and headaches  Hematological: Negative  Psychiatric/Behavioral: Negative  All other systems reviewed and are negative  Physical Examination     Vitals:    03/09/22 0830 03/09/22 1045   BP: (!) 180/92 139/78   BP Location: Right arm Right arm   Pulse: 68 84   Resp: 20 20   Temp: 98 6 °F (37 °C)    TempSrc: Oral    SpO2: 99% 96%     Vitals reviewed by me  Physical Exam  Vitals and nursing note reviewed  Constitutional:       General: He is not in acute distress  Appearance: He is well-developed  Comments: Appears uncomfortable   HENT:      Head: Normocephalic and atraumatic  Eyes:      General: No scleral icterus  Cardiovascular:      Rate and Rhythm: Normal rate and regular rhythm  Pulmonary:      Effort: Pulmonary effort is normal       Breath sounds: Normal breath sounds  Abdominal:      General: There is no distension  Palpations: Abdomen is soft  Tenderness: There is abdominal tenderness  There is no guarding or rebound  Comments: Mild epigastric tenderness   Musculoskeletal:         General: Normal range of motion  Cervical back: Normal range of motion and neck supple  Skin:     General: Skin is warm and dry  Neurological:      Mental Status: He is alert and oriented to person, place, and time  Comments: Grossly neuro intact;  Able to move all extremities   Psychiatric:         Mood and Affect: Mood normal             Risk Stratification Tools                ED Course as of 03/09/22 1330   Wed Mar 09, 2022   3465 Procedure Note: EKG  Date/Time: 03/09/22 9:14 AM   Interpreted by: Isabell Wilcox  Indications / Diagnosis: epigastric pain  ECG reviewed by me, the ED Provider: yes   The EKG demonstrates:  Rhythm: sinus, sinus arrhythmia  Intervals: normal intervals  Axis: normal axis  QRS/Blocks: normal QRS  ST Changes: No acute ST Changes, no STD/SABAS  CT Abdomen pelvis with contrast   Final Result   No acute findings                 Workstation performed: [id number]           Orders Placed This Encounter   Procedures    CT Abdomen pelvis with contrast    CBC and differential    CMP    Lipase    HS Troponin 0hr (reflex protocol)    ECG 12 lead       Labs:     Labs Reviewed   CBC AND DIFFERENTIAL - Abnormal       Result Value Ref Range Status    WBC 9 41  4 31 - 10 16 Thousand/uL Final    RBC 4 49  3 88 - 5 62 Million/uL Final    Hemoglobin 15 2  12 0 - 17 0 g/dL Final    Hematocrit 40 8  36 5 - 49 3 % Final    MCV 91  82 - 98 fL Final    MCH 33 9  26 8 - 34 3 pg Final    MCHC 37 3  31 4 - 37 4 g/dL Final    RDW 12 2  11 6 - 15 1 % Final    MPV 10 2  8 9 - 12 7 fL Final    Platelets 703  789 - 390 Thousands/uL Final    nRBC 0  /100 WBCs Final    Neutrophils Relative 87 (*) 43 - 75 % Final    Immat GRANS % 0  0 - 2 % Final    Lymphocytes Relative 10 (*) 14 - 44 % Final    Monocytes Relative 3 (*) 4 - 12 % Final    Eosinophils Relative 0  0 - 6 % Final    Basophils Relative 0  0 - 1 % Final    Neutrophils Absolute 8 14 (*) 1 85 - 7 62 Thousands/µL Final    Immature Grans Absolute 0 03  0 00 - 0 20 Thousand/uL Final    Lymphocytes Absolute 0 94  0 60 - 4 47 Thousands/µL Final    Monocytes Absolute 0 28  0 17 - 1 22 Thousand/µL Final    Eosinophils Absolute 0 00  0 00 - 0 61 Thousand/µL Final    Basophils Absolute 0 02  0 00 - 0 10 Thousands/µL Final   COMPREHENSIVE METABOLIC PANEL - Abnormal    Sodium 138  136 - 145 mmol/L Final    Potassium 3 5  3 5 - 5 3 mmol/L Final    Chloride 100  100 - 108 mmol/L Final    CO2 22  21 - 32 mmol/L Final    ANION GAP 16 (*) 4 - 13 mmol/L Final    BUN 14  5 - 25 mg/dL Final    Creatinine 0 85  0 60 - 1 30 mg/dL Final    Comment: Standardized to IDMS reference method    Glucose 127  65 - 140 mg/dL Final    Comment: If the patient is fasting, the ADA then defines impaired fasting glucose as > 100 mg/dL and diabetes as > or equal to 123 mg/dL  Specimen collection should occur prior to Sulfasalazine administration due to the potential for falsely depressed results  Specimen collection should occur prior to Sulfapyridine administration due to the potential for falsely elevated results  Calcium 9 9  8 3 - 10 1 mg/dL Final    AST 28  5 - 45 U/L Final    Comment: Specimen collection should occur prior to Sulfasalazine administration due to the potential for falsely depressed results  ALT 36  12 - 78 U/L Final    Comment: Specimen collection should occur prior to Sulfasalazine administration due to the potential for falsely depressed results  Alkaline Phosphatase 80  46 - 116 U/L Final    Total Protein 8 2  6 4 - 8 2 g/dL Final    Albumin 4 6  3 5 - 5 0 g/dL Final    Total Bilirubin 1 19 (*) 0 20 - 1 00 mg/dL Final    Comment: Use of this assay is not recommended for patients undergoing treatment with eltrombopag due to the potential for falsely elevated results      eGFR 96  ml/min/1 73sq m Final    Narrative:     [place] guidelines for Chronic Kidney Disease (CKD):     Stage 1 with normal or high GFR (GFR > 90 mL/min/1 73 square meters)    Stage 2 Mild CKD (GFR = 60-89 mL/min/1 73 square meters)    Stage 3A Moderate CKD (GFR = 45-59 mL/min/1 73 square meters)    Stage 3B Moderate CKD (GFR = 30-44 mL/min/1 73 square meters)    Stage 4 Severe CKD (GFR = 15-29 mL/min/1 73 square meters)    Stage 5 End Stage CKD (GFR <15 mL/min/1 73 square meters)  Note: GFR calculation is accurate only with a steady state creatinine   LIPASE - Normal    Lipase 73  73 - 393 u/L Final   HS TROPONIN I 0HR - Normal    hs TnI 0hr 4  "Refer to ACS Flowchart"- see link ng/L Final    Comment:                                              Initial (time 0) result  If >=50 ng/L, Myocardial injury suggested ;  Type of myocardial injury and treatment strategy  to be determined  If 5-49 ng/L, a delta result at 2 hours and or 4 hours will be needed to further evaluate  If <4 ng/L, and chest pain has been >3 hours since onset, patient may qualify for discharge based on the HEART score in the ED  If <5 ng/L and <3hours since onset of chest pain, a delta result at 2 hours will be needed to further evaluate  HS Troponin 99th Percentile URL of a Health Population=12 ng/L with a 95% Confidence Interval of 8-18 ng/L  Second Troponin (time 2 hours)  If calculated delta >= 20 ng/L,  Myocardial injury suggested ; Type of myocardial injury and treatment strategy to be determined  If 5-49 ng/L and the calculated delta is 5-19 ng/L, consult medical service for evaluation  Continue evaluation for ischemia on ecg and other possible etiology and repeat hs troponin at 4 hours  If delta is <5 ng/L at 2 hours, consider discharge based on risk stratification via the HEART score (if in ED), or WILLY risk score in IP/Observation  HS Troponin 99th Percentile URL of a Health Population=12 ng/L with a 95% Confidence Interval of 8-18 ng/L  Imaging:   No results found  Final Diagnosis:  1  Abdominal pain    2  Gastritis        Code Status: Prior    Portions of the record may have been created with voice recognition software  Occasional wrong word or "sound a like" substitutions may have occurred due to the inherent limitations of voice recognition software  Read the chart carefully and recognize, using context, where substitutions have occurred      Electronically signed by:  Santos Maddox PGY 3, MD Gunnar Cardozo MD  03/09/22 6850

## 2022-03-09 NOTE — ED ATTENDING ATTESTATION
3/9/2022  IBeverly DO, saw and evaluated the patient  I have discussed the patient with the resident/non-physician practitioner and agree with the resident's/non-physician practitioner's findings, Plan of Care, and MDM as documented in the resident's/non-physician practitioner's note, except where noted  All available labs and Radiology studies were reviewed  I was present for key portions of any procedure(s) performed by the resident/non-physician practitioner and I was immediately available to provide assistance  At this point I agree with the current assessment done in the Emergency Department  I have conducted an independent evaluation of this patient a history and physical is as follows:    61 yo M h/o ETOH use, marijuana use, HTN; presenting for evaluation of N/V  Occurred 2 days ago then resolved yesterday, recurred last night/today  Epigastric pain  Denies fevers, chills, CP, SOB, urinary complaints, changes in stool     Admitted for similar years ago    MDM: 61 yo M with epigastric pain/N/V- symptomatic management, ekg/trop, abdominal labs/CT,  dispo pending results            ED Course         Critical Care Time  Procedures

## 2022-03-10 ENCOUNTER — TELEPHONE (OUTPATIENT)
Dept: FAMILY MEDICINE CLINIC | Facility: CLINIC | Age: 57
End: 2022-03-10

## 2022-03-10 NOTE — TELEPHONE ENCOUNTER
Spoke to [facility] to let him know that he can swallow Xylocaine and that it is ready for him to  at his pharmacy

## 2022-03-11 DIAGNOSIS — M54.12 CERVICAL RADICULOPATHY: ICD-10-CM

## 2022-03-11 DIAGNOSIS — E03.9 HYPOTHYROIDISM, UNSPECIFIED TYPE: ICD-10-CM

## 2022-03-11 RX ORDER — OXYCODONE HYDROCHLORIDE 10 MG/1
10 TABLET ORAL EVERY 8 HOURS PRN
Qty: 60 TABLET | Refills: 0 | Status: SHIPPED | OUTPATIENT
Start: 2022-03-11 | End: 2022-05-01 | Stop reason: SDUPTHER

## 2022-03-11 RX ORDER — LEVOTHYROXINE SODIUM 137 UG/1
137 TABLET ORAL DAILY
Qty: 90 TABLET | Refills: 0 | Status: SHIPPED | OUTPATIENT
Start: 2022-03-11 | End: 2022-06-05 | Stop reason: SDUPTHER

## 2022-03-29 ENCOUNTER — OFFICE VISIT (OUTPATIENT)
Dept: FAMILY MEDICINE CLINIC | Facility: CLINIC | Age: 57
End: 2022-03-29
Payer: COMMERCIAL

## 2022-03-29 VITALS
HEIGHT: 70 IN | DIASTOLIC BLOOD PRESSURE: 80 MMHG | RESPIRATION RATE: 18 BRPM | TEMPERATURE: 97.5 F | SYSTOLIC BLOOD PRESSURE: 152 MMHG | WEIGHT: 211.8 LBS | OXYGEN SATURATION: 96 % | HEART RATE: 105 BPM | BODY MASS INDEX: 30.32 KG/M2

## 2022-03-29 DIAGNOSIS — K29.00 ACUTE GASTRITIS WITHOUT HEMORRHAGE, UNSPECIFIED GASTRITIS TYPE: Primary | ICD-10-CM

## 2022-03-29 DIAGNOSIS — I16.0 HYPERTENSIVE URGENCY: ICD-10-CM

## 2022-03-29 DIAGNOSIS — R10.9 ACUTE ABDOMINAL PAIN: ICD-10-CM

## 2022-03-29 DIAGNOSIS — K29.70 GASTRITIS: ICD-10-CM

## 2022-03-29 DIAGNOSIS — E03.9 HYPOTHYROIDISM, UNSPECIFIED TYPE: ICD-10-CM

## 2022-03-29 DIAGNOSIS — Z72.52 HIGH RISK HOMOSEXUAL BEHAVIOR: ICD-10-CM

## 2022-03-29 DIAGNOSIS — E78.2 MIXED HYPERLIPIDEMIA: ICD-10-CM

## 2022-03-29 PROCEDURE — 99214 OFFICE O/P EST MOD 30 MIN: CPT | Performed by: FAMILY MEDICINE

## 2022-03-29 RX ORDER — AMLODIPINE BESYLATE 5 MG/1
5 TABLET ORAL DAILY
Qty: 90 TABLET | Refills: 1 | Status: SHIPPED | OUTPATIENT
Start: 2022-03-29 | End: 2022-04-01 | Stop reason: SDUPTHER

## 2022-03-29 RX ORDER — FAMOTIDINE 20 MG/1
20 TABLET, FILM COATED ORAL DAILY
Qty: 90 TABLET | Refills: 1 | Status: SHIPPED | OUTPATIENT
Start: 2022-03-29 | End: 2022-04-01 | Stop reason: SDUPTHER

## 2022-03-29 RX ORDER — ONDANSETRON 4 MG/1
4 TABLET, FILM COATED ORAL EVERY 8 HOURS PRN
Qty: 7 TABLET | Refills: 0 | Status: SHIPPED | OUTPATIENT
Start: 2022-03-29 | End: 2022-04-01 | Stop reason: SDUPTHER

## 2022-03-29 RX ORDER — LISINOPRIL 20 MG/1
20 TABLET ORAL DAILY
Qty: 90 TABLET | Refills: 1 | Status: SHIPPED | OUTPATIENT
Start: 2022-03-29 | End: 2022-04-01 | Stop reason: SDUPTHER

## 2022-03-29 NOTE — PROGRESS NOTES
Assessment/Plan:   1  Acute gastritis without hemorrhage, unspecified gastritis type    Patient's symptoms appear likely secondary to acute gastritis  At this time, will continue with his current GI regimen  He was given a refill on his Zofran as well  He was instructed on importance of trying to avoid specific gastric triggers  Follow-up if any symptoms worsen  - ondansetron (ZOFRAN) 4 mg tablet; Take 1 tablet (4 mg total) by mouth every 8 (eight) hours as needed for nausea or vomiting for up to 7 doses  Dispense: 7 tablet; Refill: 0    2  Hypertensive urgency    Patient's blood pressure was moderately elevated today  At this time, it appears this elevation is likely due to stress  He is having significant stress secondary to his partners recent health  He was advised to continue with routine monitoring at home with his blood pressure  If he notes any persistent elevations, will consider adjustments to his treatment  Continue with his current treatment at this time  - lisinopril (ZESTRIL) 20 mg tablet; Take 1 tablet (20 mg total) by mouth daily  Dispense: 90 tablet; Refill: 1  - amLODIPine (NORVASC) 5 mg tablet; Take 1 tablet (5 mg total) by mouth daily  Dispense: 90 tablet; Refill: 1                 Diagnoses and all orders for this visit:    Acute gastritis without hemorrhage, unspecified gastritis type    Gastritis  -     famotidine (PEPCID) 20 mg tablet; Take 1 tablet (20 mg total) by mouth daily    Hypertensive urgency  -     lisinopril (ZESTRIL) 20 mg tablet; Take 1 tablet (20 mg total) by mouth daily  -     amLODIPine (NORVASC) 5 mg tablet; Take 1 tablet (5 mg total) by mouth daily    Hypothyroidism, unspecified type  -     TSH, 3rd generation with Free T4 reflex; Future    Acute abdominal pain  -     ondansetron (ZOFRAN) 4 mg tablet;  Take 1 tablet (4 mg total) by mouth every 8 (eight) hours as needed for nausea or vomiting for up to 7 doses    Mixed hyperlipidemia  -     Lipid Panel with Direct LDL reflex; Future    High risk homosexual behavior  -     HIV 1/2 Antigen/Antibody (4th Generation) w Reflex [facility]; Future          Subjective:       Chief Complaint   Patient presents with    Follow-up     3/9 abdominal pain       Patient ID: Funmilayo Hernandez is a 60 y o  male  Is presents today for a follow-up from his  in recent ED visits  He was seen in the ED secondary to significant upper abdominal pain as well as nausea and vomiting  He was seen in the ED on the 9th of this month  At that time, he did have a CT imaging as well as blood work all of which was overall stable  At this time, does believe that his symptoms were secondary to a gastritis  He was placed on Carafate as well as Pepcid  Since that visit, his symptoms have significantly improved unfortunate, he is having moderate stress today secondary to his partners admission to acute rehab facility  HPI    Review of Systems   Constitutional: Negative for activity change, chills, fatigue and fever  HENT: Negative for congestion, ear pain, sinus pressure and sore throat  Eyes: Negative for redness, itching and visual disturbance  Respiratory: Negative for cough and shortness of breath  Cardiovascular: Negative for chest pain and palpitations  Gastrointestinal: Negative for abdominal pain, diarrhea and nausea  Endocrine: Negative for cold intolerance and heat intolerance  Genitourinary: Negative for dysuria, flank pain and frequency  Musculoskeletal: Negative for arthralgias, back pain, gait problem and myalgias  Skin: Negative for color change  Allergic/Immunologic: Negative for environmental allergies  Neurological: Negative for dizziness, numbness and headaches  Psychiatric/Behavioral: Negative for behavioral problems and sleep disturbance  The patient is nervous/anxious          The following portions of the patient's history were reviewed and updated as appropriate : past family history, past medical history, past social history and past surgical history  Current Outpatient Medications:     amLODIPine (NORVASC) 5 mg tablet, Take 1 tablet (5 mg total) by mouth daily, Disp: 90 tablet, Rfl: 1    famotidine (PEPCID) 20 mg tablet, Take 1 tablet (20 mg total) by mouth daily, Disp: 90 tablet, Rfl: 1    levothyroxine 137 mcg tablet, Take 1 tablet (137 mcg total) by mouth daily, Disp: 90 tablet, Rfl: 0    Lidocaine Viscous HCl (XYLOCAINE) 2 % mucosal solution, Swish and spit 15 mL 4 (four) times a day as needed for mouth pain or discomfort, Disp: 100 mL, Rfl: 0    lisinopril (ZESTRIL) 20 mg tablet, Take 1 tablet (20 mg total) by mouth daily, Disp: 90 tablet, Rfl: 1    naloxone (NARCAN) 4 mg/0 1 mL nasal spray, Administer 1 spray into a nostril  If breathing does not return to normal or if breathing difficulty resumes after 2-3 minutes, give another dose in the other nostril using a new spray , Disp: 1 each, Rfl: 1    ondansetron (ZOFRAN) 4 mg tablet, Take 1 tablet (4 mg total) by mouth every 8 (eight) hours as needed for nausea or vomiting for up to 7 doses, Disp: 7 tablet, Rfl: 0    oxyCODONE (ROXICODONE) 10 MG TABS, Take 1 tablet (10 mg total) by mouth every 8 (eight) hours as needed for moderate pain Max Daily Amount: 30 mg, Disp: 60 tablet, Rfl: 0    diazepam (VALIUM) 5 mg tablet, Take 1 tablet (5 mg total) by mouth daily as needed for anxiety, Disp: 60 tablet, Rfl: 0    sucralfate (CARAFATE) 1 g/10 mL suspension, Take 10 mL (1 g total) by mouth 4 (four) times a day for 7 days, Disp: 420 mL, Rfl: 0         Objective:         Vitals:    03/29/22 1446   BP: 152/80   BP Location: Left arm   Patient Position: Sitting   Cuff Size: Large   Pulse: 105   Resp: 18   Temp: 97 5 °F (36 4 °C)   TempSrc: Tympanic   SpO2: 96%   Weight: 96 1 kg (211 lb 12 8 oz)   Height: 5' 10" (1 778 m)     Physical Exam  Vitals reviewed  Constitutional:       Appearance: Normal appearance  He is well-developed     HENT:      Head: Normocephalic and atraumatic  Right Ear: Hearing normal       Left Ear: Hearing normal       Nose: Nose normal  No septal deviation  Eyes:      General: Lids are normal       Pupils: Pupils are equal, round, and reactive to light  Neck:      Thyroid: No thyroid mass or thyromegaly  Trachea: Trachea normal    Cardiovascular:      Rate and Rhythm: Normal rate  Pulmonary:      Effort: Pulmonary effort is normal  No respiratory distress  Breath sounds: No decreased breath sounds  Abdominal:      Tenderness: There is no guarding  Musculoskeletal:         General: Normal range of motion  Cervical back: Normal range of motion  Skin:     General: Skin is warm and dry  Neurological:      Mental Status: He is alert and oriented to person, place, and time  Cranial Nerves: No cranial nerve deficit  Sensory: No sensory deficit  Psychiatric:         Speech: Speech normal          Behavior: Behavior normal          Thought Content:  Thought content normal          Judgment: Judgment normal

## 2022-04-01 DIAGNOSIS — K29.70 GASTRITIS: ICD-10-CM

## 2022-04-01 DIAGNOSIS — I16.0 HYPERTENSIVE URGENCY: ICD-10-CM

## 2022-04-01 DIAGNOSIS — R10.9 ACUTE ABDOMINAL PAIN: ICD-10-CM

## 2022-04-01 RX ORDER — ONDANSETRON 4 MG/1
4 TABLET, FILM COATED ORAL EVERY 8 HOURS PRN
Qty: 7 TABLET | Refills: 0 | Status: SHIPPED | OUTPATIENT
Start: 2022-04-01

## 2022-04-01 RX ORDER — LISINOPRIL 20 MG/1
20 TABLET ORAL DAILY
Qty: 90 TABLET | Refills: 1 | Status: SHIPPED | OUTPATIENT
Start: 2022-04-01

## 2022-04-01 RX ORDER — AMLODIPINE BESYLATE 5 MG/1
5 TABLET ORAL DAILY
Qty: 90 TABLET | Refills: 1 | Status: SHIPPED | OUTPATIENT
Start: 2022-04-01

## 2022-04-01 RX ORDER — FAMOTIDINE 20 MG/1
20 TABLET, FILM COATED ORAL DAILY
Qty: 90 TABLET | Refills: 1 | Status: SHIPPED | OUTPATIENT
Start: 2022-04-01

## 2022-05-01 DIAGNOSIS — M54.12 CERVICAL RADICULOPATHY: ICD-10-CM

## 2022-05-01 DIAGNOSIS — F40.243 FEAR OF FLYING: ICD-10-CM

## 2022-05-02 RX ORDER — OXYCODONE HYDROCHLORIDE 10 MG/1
10 TABLET ORAL EVERY 8 HOURS PRN
Qty: 60 TABLET | Refills: 0 | Status: SHIPPED | OUTPATIENT
Start: 2022-05-02 | End: 2022-06-05 | Stop reason: SDUPTHER

## 2022-05-02 RX ORDER — DIAZEPAM 5 MG/1
5 TABLET ORAL DAILY PRN
Qty: 60 TABLET | Refills: 0 | Status: SHIPPED | OUTPATIENT
Start: 2022-05-02 | End: 2022-06-01

## 2022-05-04 ENCOUNTER — TELEPHONE (OUTPATIENT)
Dept: HEMATOLOGY ONCOLOGY | Facility: CLINIC | Age: 57
End: 2022-05-04

## 2022-05-04 NOTE — TELEPHONE ENCOUNTER
Patient called to ask if it's required to wear a mask to his appt  I informed patient that yes, a mask is required

## 2022-06-05 DIAGNOSIS — M54.12 CERVICAL RADICULOPATHY: ICD-10-CM

## 2022-06-06 RX ORDER — OXYCODONE HYDROCHLORIDE 10 MG/1
10 TABLET ORAL EVERY 8 HOURS PRN
Qty: 60 TABLET | Refills: 0 | Status: SHIPPED | OUTPATIENT
Start: 2022-06-06

## 2022-08-19 DIAGNOSIS — M54.12 CERVICAL RADICULOPATHY: ICD-10-CM

## 2022-08-19 RX ORDER — OXYCODONE HYDROCHLORIDE 10 MG/1
10 TABLET ORAL EVERY 8 HOURS PRN
Qty: 60 TABLET | Refills: 0 | Status: SHIPPED | OUTPATIENT
Start: 2022-08-19

## 2022-09-13 ENCOUNTER — OFFICE VISIT (OUTPATIENT)
Dept: FAMILY MEDICINE CLINIC | Facility: CLINIC | Age: 57
End: 2022-09-13
Payer: COMMERCIAL

## 2022-09-13 VITALS
HEIGHT: 70 IN | SYSTOLIC BLOOD PRESSURE: 130 MMHG | OXYGEN SATURATION: 97 % | DIASTOLIC BLOOD PRESSURE: 80 MMHG | RESPIRATION RATE: 18 BRPM | BODY MASS INDEX: 31.04 KG/M2 | HEART RATE: 92 BPM | WEIGHT: 216.8 LBS | TEMPERATURE: 98.2 F

## 2022-09-13 DIAGNOSIS — F11.20 CONTINUOUS OPIOID DEPENDENCE (HCC): ICD-10-CM

## 2022-09-13 DIAGNOSIS — M54.12 CERVICAL RADICULOPATHY: Primary | ICD-10-CM

## 2022-09-13 DIAGNOSIS — G89.4 CHRONIC PAIN SYNDROME: ICD-10-CM

## 2022-09-13 DIAGNOSIS — Z12.11 SCREENING FOR COLON CANCER: ICD-10-CM

## 2022-09-13 DIAGNOSIS — E03.9 HYPOTHYROIDISM, UNSPECIFIED TYPE: ICD-10-CM

## 2022-09-13 DIAGNOSIS — F40.243 FEAR OF FLYING: ICD-10-CM

## 2022-09-13 DIAGNOSIS — I16.0 HYPERTENSIVE URGENCY: ICD-10-CM

## 2022-09-13 DIAGNOSIS — K29.50 CHRONIC GASTRITIS WITHOUT BLEEDING, UNSPECIFIED GASTRITIS TYPE: ICD-10-CM

## 2022-09-13 PROCEDURE — 99215 OFFICE O/P EST HI 40 MIN: CPT | Performed by: FAMILY MEDICINE

## 2022-09-13 NOTE — PROGRESS NOTES
Assessment/Plan   1  Cervical radiculopathy/Continuous opioid dependence (HCC)/Chronic pain syndrome  Patient appears clinically stable today  He states that his pain has been intermittent  He does use his oxycodone regularly  Reviewed opiate management visits with him  He will need to complete a random drug screen at least once per year  He MUST do this at the time of the request   He denies adverse reactions with his opiates, he denies adverse reactions  PDMP reviewed  No abnormality seen today  2  Hypertensive urgency  Stable today  Continue with routine home monitoring as well as current treatment with lisinopril  3  Chronic gastritis without bleeding, unspecified gastritis type  Denies any recent exacerbations  Continue with his current treatment of famotidine  4  Hypothyroidism, unspecified type  Stable  Recheck thyroid function testing  Continue with current dose of levothyroxine  5  Fear of flying  Patient has used alprazolam diazepam in the past for his fear of flying  He was given another refill today  PDMP reviewed  No abnormality seen  Follow-up in 6 months  6  Screening for colon cancer  - Ambulatory referral for colonoscopy; Future      Problem List Items Addressed This Visit        Endocrine    Hypothyroidism       Other    Fear of flying      Other Visit Diagnoses     Cervical radiculopathy    -  Primary    Hypertensive urgency        Chronic gastritis without bleeding, unspecified gastritis type        Continuous opioid dependence ([facility] [address] )        Chronic pain syndrome        Screening for colon cancer        Relevant Orders    Ambulatory referral for colonoscopy             Opiate risks  There are risks associated with opioid medications, including dependence, addiction and tolerance  The patient understands and agrees to use these medications only as prescribed   Potential side effects of the medications include, but are not limited to, constipation, drowsiness, addiction, impaired judgment and risk of fatal overdose if not taken as prescribed  The patient was warned against driving while taking sedation medications  Sharing medications is a felony  At this point in time, the patient is showing no signs of addiction, abuse, diversion or suicidal ideation  Pateint is taking concurrent benzodiazepines  Has been counseled on the risks of taking opioids and benzodiazepines including sedation, increased fall risk, dizziness, addictive potential and death  Subjective   Patient is a 47-year-old male presents today for his chronic conditions  He has cervical radiculopathy, chronic opiate dependence, hypertension, gastritis, hypothyroidism as well as a fear of flying  Opioid Management:   Type of visit: Follow-up    Aberrant behavior?: No      Adverse effects from medication?: No      Screening Tools/Assessments:    PHQ-2/9:  Last PHQ-2 score: 0 (Last PHQ-2 date: 3/29/2022)    Brief Pain Inventory (BPI):  1) Throughout our lives, most of us have had pain from time to time (such as minor headaches, sprains, and toothaches)  Have you had pain other than these everyday kinds of pain today? Yes  2) Where is your pain located? Neck, shoulders, back  3) Rate your pain at its worst in the last 24 hours: 0  4) Rate your pain at its least in the last 24 hours: 0  5) Rate your average level of pain: 4  6) Rate your pain right now: 0  7) What treatments or medications are you receiving for your pain?  Oxycodone  8) In the past 24 hours, how much relief have pain treatments or medication provided? 0%  9) During the past 24 hours, pain has interfered with your:     A) General activity: 0     B) Mood: 0     C) Walking ability: 0     D) Normal work (work outside the home & housework): 0     E) Relations with other people: 0     F) Sleep: 0     G) Enjoyment of life: 0    COMM:  Current COMM Score: 2 (negative, low risk patient)    Opioid agreement:  Active Opioid agreement on file?: No    Opioid agreement signed date: 8/13/2019  Opioid agreement expiration date: 8/13/2020    Naloxone:  Currently prescribed Naloxone (Narcan): Yes      HPI  Pain Medications             oxyCODONE (ROXICODONE) 10 MG TABS Take 1 tablet (10 mg total) by mouth every 8 (eight) hours as needed for moderate pain Max Daily Amount: 30 mg         Outpatient Morphine Milligram Equivalents Per Day     9/15/22 and after 39 MME/Day    Order Name Dose Route Frequency Maximum MME/Day     oxyCODONE (ROXICODONE) 10 MG TABS 10 mg Oral Every 8 hours PRN 45 MME/Day    Total Potential Morphine Milligram Equivalents Per Day 45 MME/Day    Calculation Information        oxyCODONE (ROXICODONE) 10 MG TABS    oxyCODONE 10 MG Tabs: maximum daily dose of 30 mg * morphine equivalence factor of 1 5 = 45 MME/Day                         PDMP Review       Value Time User    PDMP Reviewed  Yes 6/6/2022  8:38 PM Ihab Hans,          Review of Systems  Objective     /80   Pulse 92   Temp 98 2 °F (36 8 °C) (Tympanic)   Resp 18   Ht 5' 10" (1 778 m)   Wt 98 3 kg (216 lb 12 8 oz)   SpO2 97%   BMI 31 11 kg/m²     Physical Exam  Vitals reviewed  Constitutional:       General: He is not in acute distress  Appearance: He is well-developed  He is not diaphoretic  HENT:      Head: Normocephalic and atraumatic  No right periorbital erythema or left periorbital erythema  Right Ear: Tympanic membrane normal  No decreased hearing noted  Left Ear: Tympanic membrane normal  No decreased hearing noted  Nose: Nose normal       Right Sinus: No maxillary sinus tenderness or frontal sinus tenderness  Left Sinus: No maxillary sinus tenderness or frontal sinus tenderness  Mouth/Throat:      Lips: Pink  Mouth: Mucous membranes are moist       Pharynx: No oropharyngeal exudate  Tonsils: No tonsillar exudate or tonsillar abscesses     Eyes:      General: Lids are normal       Extraocular Movements: Extraocular movements intact  Conjunctiva/sclera: Conjunctivae normal       Pupils: Pupils are equal, round, and reactive to light  Neck:      Thyroid: No thyroid mass  Trachea: Trachea normal    Cardiovascular:      Rate and Rhythm: Normal rate and regular rhythm  Pulses: Normal pulses  Heart sounds: Normal heart sounds  No murmur heard  No friction rub  No gallop  Pulmonary:      Effort: Pulmonary effort is normal  No respiratory distress  Breath sounds: Normal breath sounds  No wheezing or rales  Abdominal:      General: Bowel sounds are normal  There is no distension  Palpations: Abdomen is soft  There is no mass  Tenderness: There is no abdominal tenderness  There is no guarding  Musculoskeletal:         General: Normal range of motion  Cervical back: Normal range of motion and neck supple  Skin:     General: Skin is warm and dry  Coloration: Skin is not pale  Findings: No erythema or rash  Neurological:      Mental Status: He is alert and oriented to person, place, and time  Cranial Nerves: No cranial nerve deficit  Coordination: Coordination normal    Psychiatric:         Attention and Perception: Attention and perception normal          Mood and Affect: Mood and affect normal          Speech: Speech normal          Behavior: Behavior normal          Thought Content:  Thought content normal          Cognition and Memory: Cognition and memory normal          Judgment: Judgment normal          Ihab Abdelaal, DO

## 2022-09-13 NOTE — PATIENT INSTRUCTIONS

## 2022-09-23 DIAGNOSIS — E03.9 HYPOTHYROIDISM, UNSPECIFIED TYPE: ICD-10-CM

## 2022-09-23 RX ORDER — LEVOTHYROXINE SODIUM 137 UG/1
137 TABLET ORAL DAILY
Qty: 90 TABLET | Refills: 1 | Status: SHIPPED | OUTPATIENT
Start: 2022-09-23

## 2022-10-05 ENCOUNTER — HOSPITAL ENCOUNTER (EMERGENCY)
Facility: HOSPITAL | Age: 57
Discharge: HOME | End: 2022-10-05
Attending: EMERGENCY MEDICINE
Payer: COMMERCIAL

## 2022-10-05 ENCOUNTER — APPOINTMENT (EMERGENCY)
Dept: CT IMAGING | Facility: HOSPITAL | Age: 57
End: 2022-10-05
Payer: COMMERCIAL

## 2022-10-05 VITALS
SYSTOLIC BLOOD PRESSURE: 195 MMHG | RESPIRATION RATE: 18 BRPM | HEART RATE: 68 BPM | OXYGEN SATURATION: 99 % | TEMPERATURE: 98 F | DIASTOLIC BLOOD PRESSURE: 104 MMHG

## 2022-10-05 DIAGNOSIS — R11.2 NAUSEA AND VOMITING: Primary | ICD-10-CM

## 2022-10-05 DIAGNOSIS — K29.00 ACUTE GASTRITIS WITHOUT HEMORRHAGE, UNSPECIFIED GASTRITIS TYPE: ICD-10-CM

## 2022-10-05 DIAGNOSIS — R10.9 ABDOMINAL PAIN: ICD-10-CM

## 2022-10-05 DIAGNOSIS — K29.70 GASTRITIS: ICD-10-CM

## 2022-10-05 LAB
ALBUMIN SERPL BCP-MCNC: 4.9 G/DL (ref 3.5–5)
ALP SERPL-CCNC: 67 U/L (ref 46–116)
ALT SERPL W P-5'-P-CCNC: 25 U/L (ref 12–78)
ANION GAP SERPL CALCULATED.3IONS-SCNC: 11 MMOL/L (ref 4–13)
AST SERPL W P-5'-P-CCNC: 24 U/L (ref 5–45)
BASOPHILS # BLD AUTO: 0.01 THOUSANDS/ΜL (ref 0–0.1)
BASOPHILS NFR BLD AUTO: 0 % (ref 0–1)
BILIRUB SERPL-MCNC: 1.48 MG/DL (ref 0.2–1)
BUN SERPL-MCNC: 15 MG/DL (ref 5–25)
CALCIUM SERPL-MCNC: 10.6 MG/DL (ref 8.3–10.1)
CHLORIDE SERPL-SCNC: 99 MMOL/L (ref 96–108)
CO2 SERPL-SCNC: 23 MMOL/L (ref 21–32)
CREAT SERPL-MCNC: 1 MG/DL (ref 0.6–1.3)
EOSINOPHIL # BLD AUTO: 0.01 THOUSAND/ΜL (ref 0–0.61)
EOSINOPHIL NFR BLD AUTO: 0 % (ref 0–6)
ERYTHROCYTE [DISTWIDTH] IN BLOOD BY AUTOMATED COUNT: 11.6 % (ref 11.6–15.1)
GFR SERPL CREATININE-BSD FRML MDRD: 82 ML/MIN/1.73SQ M
GLUCOSE SERPL-MCNC: 118 MG/DL (ref 65–140)
HCT VFR BLD AUTO: 44.7 % (ref 36.5–49.3)
HGB BLD-MCNC: 16.1 G/DL (ref 12–17)
IMM GRANULOCYTES # BLD AUTO: 0.03 THOUSAND/UL (ref 0–0.2)
IMM GRANULOCYTES NFR BLD AUTO: 0 % (ref 0–2)
LIPASE SERPL-CCNC: 112 U/L (ref 73–393)
LYMPHOCYTES # BLD AUTO: 1.27 THOUSANDS/ΜL (ref 0.6–4.47)
LYMPHOCYTES NFR BLD AUTO: 13 % (ref 14–44)
MCH RBC QN AUTO: 34.4 PG (ref 26.8–34.3)
MCHC RBC AUTO-ENTMCNC: 36 G/DL (ref 31.4–37.4)
MCV RBC AUTO: 96 FL (ref 82–98)
MONOCYTES # BLD AUTO: 0.48 THOUSAND/ΜL (ref 0.17–1.22)
MONOCYTES NFR BLD AUTO: 5 % (ref 4–12)
NEUTROPHILS # BLD AUTO: 8.13 THOUSANDS/ΜL (ref 1.85–7.62)
NEUTS SEG NFR BLD AUTO: 82 % (ref 43–75)
NRBC BLD AUTO-RTO: 0 /100 WBCS
PLATELET # BLD AUTO: 247 THOUSANDS/UL (ref 149–390)
PMV BLD AUTO: 9.6 FL (ref 8.9–12.7)
POTASSIUM SERPL-SCNC: 4.2 MMOL/L (ref 3.5–5.3)
PROT SERPL-MCNC: 9.4 G/DL (ref 6.4–8.4)
RBC # BLD AUTO: 4.68 MILLION/UL (ref 3.88–5.62)
SODIUM SERPL-SCNC: 133 MMOL/L (ref 135–147)
WBC # BLD AUTO: 9.93 THOUSAND/UL (ref 4.31–10.16)

## 2022-10-05 PROCEDURE — 36415 COLL VENOUS BLD VENIPUNCTURE: CPT

## 2022-10-05 PROCEDURE — 96361 HYDRATE IV INFUSION ADD-ON: CPT

## 2022-10-05 PROCEDURE — G1004 CDSM NDSC: HCPCS

## 2022-10-05 PROCEDURE — 96374 THER/PROPH/DIAG INJ IV PUSH: CPT

## 2022-10-05 PROCEDURE — 83690 ASSAY OF LIPASE: CPT

## 2022-10-05 PROCEDURE — 85025 COMPLETE CBC W/AUTO DIFF WBC: CPT

## 2022-10-05 PROCEDURE — 80053 COMPREHEN METABOLIC PANEL: CPT

## 2022-10-05 PROCEDURE — 99285 EMERGENCY DEPT VISIT HI MDM: CPT

## 2022-10-05 PROCEDURE — 96372 THER/PROPH/DIAG INJ SC/IM: CPT

## 2022-10-05 PROCEDURE — 74177 CT ABD & PELVIS W/CONTRAST: CPT

## 2022-10-05 PROCEDURE — 96375 TX/PRO/DX INJ NEW DRUG ADDON: CPT

## 2022-10-05 RX ORDER — SUCRALFATE 1 G/1
1 TABLET ORAL 4 TIMES DAILY
Qty: 30 TABLET | Refills: 0 | Status: SHIPPED | OUTPATIENT
Start: 2022-10-05 | End: 2022-10-25

## 2022-10-05 RX ORDER — METOCLOPRAMIDE HYDROCHLORIDE 5 MG/ML
10 INJECTION INTRAMUSCULAR; INTRAVENOUS ONCE
Status: COMPLETED | OUTPATIENT
Start: 2022-10-05 | End: 2022-10-05

## 2022-10-05 RX ORDER — ONDANSETRON 2 MG/ML
1 INJECTION INTRAMUSCULAR; INTRAVENOUS ONCE
Status: COMPLETED | OUTPATIENT
Start: 2022-10-05 | End: 2022-10-05

## 2022-10-05 RX ORDER — MAGNESIUM HYDROXIDE/ALUMINUM HYDROXICE/SIMETHICONE 120; 1200; 1200 MG/30ML; MG/30ML; MG/30ML
30 SUSPENSION ORAL ONCE
Status: COMPLETED | OUTPATIENT
Start: 2022-10-05 | End: 2022-10-05

## 2022-10-05 RX ORDER — LIDOCAINE HYDROCHLORIDE 20 MG/ML
15 SOLUTION OROPHARYNGEAL 4 TIMES DAILY PRN
Qty: 100 ML | Refills: 0 | Status: SHIPPED | OUTPATIENT
Start: 2022-10-05

## 2022-10-05 RX ORDER — ONDANSETRON 4 MG/1
4 TABLET, ORALLY DISINTEGRATING ORAL EVERY 6 HOURS PRN
Qty: 20 TABLET | Refills: 0 | Status: SHIPPED | OUTPATIENT
Start: 2022-10-05

## 2022-10-05 RX ORDER — FAMOTIDINE 20 MG/1
20 TABLET, FILM COATED ORAL 2 TIMES DAILY
Qty: 14 TABLET | Refills: 0 | Status: SHIPPED | OUTPATIENT
Start: 2022-10-05 | End: 2022-10-13

## 2022-10-05 RX ORDER — KETOROLAC TROMETHAMINE 30 MG/ML
1 INJECTION, SOLUTION INTRAMUSCULAR; INTRAVENOUS ONCE
Status: COMPLETED | OUTPATIENT
Start: 2022-10-05 | End: 2022-10-05

## 2022-10-05 RX ORDER — ONDANSETRON 2 MG/ML
4 INJECTION INTRAMUSCULAR; INTRAVENOUS ONCE
Status: COMPLETED | OUTPATIENT
Start: 2022-10-05 | End: 2022-10-05

## 2022-10-05 RX ORDER — LIDOCAINE HYDROCHLORIDE 20 MG/ML
15 SOLUTION OROPHARYNGEAL ONCE
Status: COMPLETED | OUTPATIENT
Start: 2022-10-05 | End: 2022-10-05

## 2022-10-05 RX ORDER — HALOPERIDOL 5 MG/ML
2 INJECTION INTRAMUSCULAR ONCE
Status: COMPLETED | OUTPATIENT
Start: 2022-10-05 | End: 2022-10-05

## 2022-10-05 RX ADMIN — IOHEXOL 100 ML: 350 INJECTION, SOLUTION INTRAVENOUS at 15:10

## 2022-10-05 RX ADMIN — LIDOCAINE HYDROCHLORIDE 15 ML: 20 SOLUTION ORAL at 16:00

## 2022-10-05 RX ADMIN — ONDANSETRON 4 MG: 2 INJECTION INTRAMUSCULAR; INTRAVENOUS at 14:02

## 2022-10-05 RX ADMIN — HALOPERIDOL LACTATE 2 MG: 5 INJECTION, SOLUTION INTRAMUSCULAR at 14:19

## 2022-10-05 RX ADMIN — SODIUM CHLORIDE 1000 ML: 0.9 INJECTION, SOLUTION INTRAVENOUS at 14:02

## 2022-10-05 RX ADMIN — ALUMINUM HYDROXIDE, MAGNESIUM HYDROXIDE, AND DIMETHICONE 30 ML: 200; 20; 200 SUSPENSION ORAL at 16:00

## 2022-10-05 RX ADMIN — METOCLOPRAMIDE 10 MG: 5 INJECTION, SOLUTION INTRAMUSCULAR; INTRAVENOUS at 15:26

## 2022-10-05 NOTE — ED CARE HANDOFF
Emergency Department Sign Out Note        Sign out and transfer of care from [facility]  See Separate Emergency Department note  The patient, [place] Left, was evaluated by the previous provider for abdominal pain, nausea, vomiting  Suspicion for gastritis per previous provider  Workup Completed:  Labs Reviewed   CBC AND DIFFERENTIAL - Abnormal       Result Value Ref Range Status    WBC 9 93  4 31 - 10 16 Thousand/uL Final    RBC 4 68  3 88 - 5 62 Million/uL Final    Hemoglobin 16 1  12 0 - 17 0 g/dL Final    Hematocrit 44 7  36 5 - 49 3 % Final    MCV 96  82 - 98 fL Final    MCH 34 4 (*) 26 8 - 34 3 pg Final    MCHC 36 0  31 4 - 37 4 g/dL Final    RDW 11 6  11 6 - 15 1 % Final    MPV 9 6  8 9 - 12 7 fL Final    Platelets 821  672 - 390 Thousands/uL Final    nRBC 0  /100 WBCs Final    Neutrophils Relative 82 (*) 43 - 75 % Final    Immat GRANS % 0  0 - 2 % Final    Lymphocytes Relative 13 (*) 14 - 44 % Final    Monocytes Relative 5  4 - 12 % Final    Eosinophils Relative 0  0 - 6 % Final    Basophils Relative 0  0 - 1 % Final    Neutrophils Absolute 8 13 (*) 1 85 - 7 62 Thousands/µL Final    Immature Grans Absolute 0 03  0 00 - 0 20 Thousand/uL Final    Lymphocytes Absolute 1 27  0 60 - 4 47 Thousands/µL Final    Monocytes Absolute 0 48  0 17 - 1 22 Thousand/µL Final    Eosinophils Absolute 0 01  0 00 - 0 61 Thousand/µL Final    Basophils Absolute 0 01  0 00 - 0 10 Thousands/µL Final   COMPREHENSIVE METABOLIC PANEL - Abnormal    Sodium 133 (*) 135 - 147 mmol/L Final    Potassium 4 2  3 5 - 5 3 mmol/L Final    Comment: Slightly Hemolyzed;  Results May be Affected    Chloride 99  96 - 108 mmol/L Final    CO2 23  21 - 32 mmol/L Final    ANION GAP 11  4 - 13 mmol/L Final    BUN 15  5 - 25 mg/dL Final    Creatinine 1 00  0 60 - 1 30 mg/dL Final    Comment: Standardized to IDMS reference method    Glucose 118  65 - 140 mg/dL Final    Comment: If the patient is fasting, the ADA then defines impaired fasting glucose as > 100 mg/dL and diabetes as > or equal to 123 mg/dL  Specimen collection should occur prior to Sulfasalazine administration due to the potential for falsely depressed results  Specimen collection should occur prior to Sulfapyridine administration due to the potential for falsely elevated results  Calcium 10 6 (*) 8 3 - 10 1 mg/dL Final    AST 24  5 - 45 U/L Final    Comment: Slightly Hemolyzed; Results May be Affected  Specimen collection should occur prior to Sulfasalazine administration due to the potential for falsely depressed results  ALT 25  12 - 78 U/L Final    Comment: Specimen collection should occur prior to Sulfasalazine administration due to the potential for falsely depressed results  Alkaline Phosphatase 67  46 - 116 U/L Final    Total Protein 9 4 (*) 6 4 - 8 4 g/dL Final    Albumin 4 9  3 5 - 5 0 g/dL Final    Total Bilirubin 1 48 (*) 0 20 - 1 00 mg/dL Final    Comment: Use of this assay is not recommended for patients undergoing treatment with eltrombopag due to the potential for falsely elevated results  eGFR 82  ml/min/1 73sq m Final    Narrative:     [place] guidelines for Chronic Kidney Disease (CKD):     Stage 1 with normal or high GFR (GFR > 90 mL/min/1 73 square meters)    Stage 2 Mild CKD (GFR = 60-89 mL/min/1 73 square meters)    Stage 3A Moderate CKD (GFR = 45-59 mL/min/1 73 square meters)    Stage 3B Moderate CKD (GFR = 30-44 mL/min/1 73 square meters)    Stage 4 Severe CKD (GFR = 15-29 mL/min/1 73 square meters)    Stage 5 End Stage CKD (GFR <15 mL/min/1 73 square meters)  Note: GFR calculation is accurate only with a steady state creatinine   LIPASE - Normal    Lipase 112  73 - 393 u/L Final     CT Abdomen pelvis with contrast   Final Result      No significant interval change  No evidence for bowel obstruction              Workstation performed: [id number]               ED Course / Workup Pending (followup):  -Significant improvement in symptoms noted by patient on my re-exam following maalox  -Stable for discharge for treatment of suspected gastritis  -Follow up, return indications discussed with patient                                     Procedures  MDM        Disposition  Final diagnoses:   Nausea and vomiting   Abdominal pain   Gastritis     Time reflects when diagnosis was documented in both MDM as applicable and the Disposition within this note     Time User Action Codes Description Comment    10/5/2022  3:38 PM Javed Spells Add [R11 2] Nausea and vomiting     10/5/2022  3:38 PM Javed Spells Add [R10 9] Abdominal pain     10/5/2022  3:39 PM Javed Spells Add [K29 70] Gastritis       ED Disposition     ED Disposition   Discharge    Condition   Stable    Date/Time   Wed Oct 5, 2022  4:58 PM    Comment   Altaf Fierro discharge to home/self care                 Follow-up Information     Follow up With Specialties Details Why [address] [facility] Emergency Department Emergency Medicine  If symptoms worsen [facility] [id number]  [address] [facility] Emergency Department, [address] [place] [address], [address],  Family Medicine Schedule an appointment as soon as possible for a visit   [address]  [address]        [facility] Gastro[facility]ology Schedule an appointment as soon as possible for a visit   [address] [facility] [address] [id number]  Alee Gordon [address] [facility], [address] [place] [address], [facility] [address], [place], [place], [address] [phone]        Patient's Medications   Discharge Prescriptions    FAMOTIDINE (PEPCID) 20 MG TABLET    Take 1 tablet (20 mg total) by mouth 2 (two) times a day for 7 days       Start Date: 10/5/2022 End Date: 10/12/2022       Order Dose: 20 mg       Quantity: 14 tablet    Refills: 0    ONDANSETRON (ZOFRAN ODT) 4 MG DISINTEGRATING TABLET    Take 1 tablet (4 mg total) by mouth every 6 (six) hours as needed for nausea or vomiting       Start Date: 10/5/2022 End Date: --       Order Dose: 4 mg       Quantity: 20 tablet    Refills: 0    SUCRALFATE (CARAFATE) 1 G TABLET    Take 1 tablet (1 g total) by mouth 4 (four) times a day       Start Date: 10/5/2022 End Date: --       Order Dose: 1 g       Quantity: 30 tablet    Refills: 0            ED Provider  Electronically Signed by     Gustavo Fernandez PA-C  10/05/22 1066

## 2022-10-05 NOTE — DISCHARGE INSTRUCTIONS
-schedule follow up with PCP  -schedule follow up with GI  -drink fluids  -bland diet     Please refer to the attached information for strict return instructions  If symptoms worsen or new symptoms develop please return to the ER

## 2022-10-05 NOTE — ED PROVIDER NOTES
History  Chief Complaint   Patient presents with    Abdominal Pain     Pt presents via ems complaining of generalized abdominal pain, nausea, vomiting, and chills x4 days  Pt denies cp,sob, diarrhea  Pt unable to take BP meds x2 days  This is a 33-year-old male with past medical history of hypothyroidism, hypertension, cervical radiculopathy on chronic opioids, and gastritis on pepcid who presents today with generalized abdominal pain, nausea, vomiting and chills since Friday  States that vomit is nonbilious and non bloody  Denies any chest pain, shortness of breath, diarrhea, urinary symptoms  Does admit to occasional alcohol use, denies it being daily  Also admits to recreational marijuana use however states it was at least >1 week prior to symptoms starting  Prior to Admission Medications   Prescriptions Last Dose Informant Patient Reported? Taking? Lidocaine Viscous HCl (XYLOCAINE) 2 % mucosal solution   No No   Sig: Swish and spit 15 mL 4 (four) times a day as needed for mouth pain or discomfort   amLODIPine (NORVASC) 5 mg tablet   No No   Sig: Take 1 tablet (5 mg total) by mouth daily   diazepam (VALIUM) 5 mg tablet   No No   Sig: Take 1 tablet (5 mg total) by mouth daily as needed for anxiety   famotidine (PEPCID) 20 mg tablet   No No   Sig: Take 1 tablet (20 mg total) by mouth daily   levothyroxine 137 mcg tablet   No No   Sig: Take 1 tablet (137 mcg total) by mouth daily   lisinopril (ZESTRIL) 20 mg tablet   No No   Sig: Take 1 tablet (20 mg total) by mouth daily   naloxone (NARCAN) 4 mg/0 1 mL nasal spray   No No   Sig: Administer 1 spray into a nostril  If breathing does not return to normal or if breathing difficulty resumes after 2-3 minutes, give another dose in the other nostril using a new spray     ondansetron (ZOFRAN) 4 mg tablet   No No   Sig: Take 1 tablet (4 mg total) by mouth every 8 (eight) hours as needed for nausea or vomiting for up to 7 doses   oxyCODONE (ROXICODONE) 10 MG TABS   No No   Sig: Take 1 tablet (10 mg total) by mouth every 8 (eight) hours as needed for moderate pain Max Daily Amount: 30 mg   sucralfate (CARAFATE) 1 g/10 mL suspension   No No   Sig: Take 10 mL (1 g total) by mouth 4 (four) times a day for 7 days      Facility-Administered Medications: None       Past Medical History:   Diagnosis Date    Abnormal glucose     last assessed-12/5/2013    Acute infective pharyngitis     last assessed-5/19/2013    Disease of thyroid gland     Elevated blood pressure reading without diagnosis of hypertension     last assessed-12/5/2013    Enlarged prostate     last assessed-12/2/2013    Exposure to STD     last assessed-3/30/2016    Hypertension     Injury of blood vessel at forearm level     last assessed-3/30/2013       Past Surgical History:   Procedure Laterality Date    NO PAST SURGERIES         Family History   Problem Relation Age of Onset    No Known Problems Mother     Kidney failure Father      I have reviewed and agree with the history as documented  E-Cigarette/Vaping    E-Cigarette Use Never User      E-Cigarette/Vaping Substances     Social History     Tobacco Use    Smoking status: Never Smoker    Smokeless tobacco: Never Used   Vaping Use    Vaping Use: Never used   Substance Use Topics    Alcohol use: Yes     Comment: socially    Drug use: No       Review of Systems   Constitutional: Positive for chills  Gastrointestinal: Positive for abdominal pain, nausea and vomiting  Negative for diarrhea  Genitourinary: Negative for decreased urine volume  All other systems reviewed and are negative  Physical Exam  Physical Exam  Vitals and nursing note reviewed  Constitutional:       General: He is in acute distress  Appearance: He is well-developed  He is ill-appearing  HENT:      Head: Normocephalic and atraumatic     Eyes:      Conjunctiva/sclera: Conjunctivae normal    Cardiovascular:      Rate and Rhythm: Normal rate and regular rhythm  Heart sounds: No murmur heard  Pulmonary:      Effort: Pulmonary effort is normal  No respiratory distress  Breath sounds: Normal breath sounds  Abdominal:      Palpations: Abdomen is soft  Tenderness: There is generalized abdominal tenderness  Musculoskeletal:      Cervical back: Neck supple  Skin:     General: Skin is warm and dry  Capillary Refill: Capillary refill takes less than 2 seconds  Neurological:      General: No focal deficit present  Mental Status: He is alert and oriented to person, place, and time     Psychiatric:         Mood and Affect: Mood normal          Behavior: Behavior normal          Vital Signs  ED Triage Vitals [10/05/22 1317]   Temperature Pulse Respirations Blood Pressure SpO2   98 °F (36 7 °C) 71 18 (!) 205/98 99 %      Temp Source Heart Rate Source Patient Position - Orthostatic VS BP Location FiO2 (%)   Oral Monitor Sitting Right arm --      Pain Score       --           Vitals:    10/05/22 1317 10/05/22 1531   BP: (!) 205/98 (!) 195/104   Pulse: 71 68   Patient Position - Orthostatic VS: Sitting Sitting         Visual Acuity      ED Medications  Medications   ondansetron (FOR EMS ONLY) (ZOFRAN) 4 mg/2 mL injection 4 mg (0 mg Does not apply Given to EMS 10/5/22 1355)   ketorolac (FOR EMS ONLY) (TORADOL) injection 30 mg (0 mg Does not apply Given to EMS 10/5/22 1355)   ondansetron (ZOFRAN) injection 4 mg (4 mg Intravenous Given 10/5/22 1402)   sodium chloride 0 9 % bolus 1,000 mL (1,000 mL Intravenous New Bag 10/5/22 1402)   haloperidol lactate (HALDOL) injection 2 mg (2 mg Intramuscular Given 10/5/22 1419)   metoclopramide (REGLAN) injection 10 mg (10 mg Intravenous Given 10/5/22 1526)   iohexol (OMNIPAQUE) 350 MG/ML injection (SINGLE-DOSE) 100 mL (100 mL Intravenous Given 10/5/22 1510)   aluminum-magnesium hydroxide-simethicone (MYLANTA) oral suspension 30 mL (30 mL Oral Given 10/5/22 1600)   Lidocaine Viscous HCl (XYLOCAINE) 2 % mucosal solution 15 mL (15 mL Swish & Spit Given 10/5/22 1600)       Diagnostic Studies  Results Reviewed     Procedure Component Value Units Date/Time    CMP [[id number]]  (Abnormal) Collected: 10/05/22 1359    Lab Status: Final result Specimen: Blood from Hand, Right Updated: 10/05/22 1427     Sodium 133 mmol/L      Potassium 4 2 mmol/L      Chloride 99 mmol/L      CO2 23 mmol/L      ANION GAP 11 mmol/L      BUN 15 mg/dL      Creatinine 1 00 mg/dL      Glucose 118 mg/dL      Calcium 10 6 mg/dL      AST 24 U/L      ALT 25 U/L      Alkaline Phosphatase 67 U/L      Total Protein 9 4 g/dL      Albumin 4 9 g/dL      Total Bilirubin 1 48 mg/dL      eGFR 82 ml/min/1 73sq m     Narrative:      National Kidney Disease Foundation guidelines for Chronic Kidney Disease (CKD):     Stage 1 with normal or high GFR (GFR > 90 mL/min/1 73 square meters)    Stage 2 Mild CKD (GFR = 60-89 mL/min/1 73 square meters)    Stage 3A Moderate CKD (GFR = 45-59 mL/min/1 73 square meters)    Stage 3B Moderate CKD (GFR = 30-44 mL/min/1 73 square meters)    Stage 4 Severe CKD (GFR = 15-29 mL/min/1 73 square meters)    Stage 5 End Stage CKD (GFR <15 mL/min/1 73 square meters)  Note: GFR calculation is accurate only with a steady state creatinine    Lipase [[id number]]  (Normal) Collected: 10/05/22 1359    Lab Status: Final result Specimen: Blood from Hand, Right Updated: 10/05/22 1427     Lipase 112 u/L     CBC and differential [[id number]]  (Abnormal) Collected: 10/05/22 1359    Lab Status: Final result Specimen: Blood from Hand, Right Updated: 10/05/22 1407     WBC 9 93 Thousand/uL      RBC 4 68 Million/uL      Hemoglobin 16 1 g/dL      Hematocrit 44 7 %      MCV 96 fL      MCH 34 4 pg      MCHC 36 0 g/dL      RDW 11 6 %      MPV 9 6 fL      Platelets 199 Thousands/uL      nRBC 0 /100 WBCs      Neutrophils Relative 82 %      Immat GRANS % 0 %      Lymphocytes Relative 13 %      Monocytes Relative 5 %      Eosinophils Relative 0 %      Basophils Relative 0 %      Neutrophils Absolute 8 13 Thousands/µL      Immature Grans Absolute 0 03 Thousand/uL      Lymphocytes Absolute 1 27 Thousands/µL      Monocytes Absolute 0 48 Thousand/µL      Eosinophils Absolute 0 01 Thousand/µL      Basophils Absolute 0 01 Thousands/µL                  CT Abdomen pelvis with contrast   Final Result by Tab Coelho MD (10/05 1544)      No significant interval change  No evidence for bowel obstruction  Workstation performed: [id number]                    Procedures  Procedures         ED Course  ED Course as of 10/05/22 1610   Wed Oct 05, 2022   1438 TOTAL BILIRUBIN(!): 1 48  1 15:    7 months ago   1547 CT Abdomen pelvis with contrast  No significant interval change  No evidence for bowel obstruction  1558 Went to discuss labs and CT scan  Pt asking for more pain medications  States "morphine and something with Mylanta usually works"  Discussed that we will try GI cocktail  Plan to discharge him with GI follow up    1603 Observed pt drinking water    1609 S/o to JK: can be discharged once pt is feeling better             SBIRT 22yo+    Flowsheet Row Most Recent Value   SBIRT (23 yo +)    In order to provide better care to our patients, we are screening all of our patients for alcohol and drug use  Would it be okay to ask you these screening questions? No Filed at: 10/05/2022 1323                    MDM  Number of Diagnoses or Management Options  Abdominal pain: new and requires workup  Gastritis: new and requires workup  Nausea and vomiting: new and requires workup  Diagnosis management comments: This is a 24-year-old male with past medical history of hypothyroidism, hypertension, cervical radiculopathy on chronic opioids, and gastritis on pepcid who presents today with generalized abdominal pain, nausea, vomiting and chills since Friday  Physical exam as noted above  Lab work WNL  CT scan showed no acute findings   Suspect this is a viral gastritis as pt has had this in the past  Referral to GI placed  Sign out to Krystin Bañuelos PA-C: pt has received pain meds and anti-emetics  Can be discharged once pain has gotten better          Amount and/or Complexity of Data Reviewed  Clinical lab tests: ordered and reviewed  Tests in the radiology section of CPT®: ordered and reviewed  Decide to obtain previous medical records or to obtain history from someone other than the patient: yes  Review and summarize past medical records: yes    Patient Progress  Patient progress: stable      Disposition  Final diagnoses:   Nausea and vomiting   Abdominal pain   Gastritis     Time reflects when diagnosis was documented in both MDM as applicable and the Disposition within this note     Time User Action Codes Description Comment    10/5/2022  3:38 PM Shawn Melendez [R11 2] Nausea and vomiting     10/5/2022  3:38 PM Chris Sotomayor [R10 9] Abdominal pain     10/5/2022  3:39 PM Chris Sotomayor [K29 70] Gastritis       ED Disposition     None      Follow-up Information     Follow up With Specialties Details Why Contact Info Additional [address] [facility] Emergency Department Emergency Medicine  If symptoms worsen [facility] [id number] [address] [facility] Emergency Department, [address] [place] [address], [address], DO Family Medicine Schedule an appointment as soon as possible for a visit   [address]  [address]        [facility] Gastroenterology Schedule an appointment as soon as possible for a visit   [address] [facility]  [address] [facility] [address]  Alee Gordon [address], [address] [place] [address], [address], [facility] [place], [address] [phone]          Patient's Medications   Discharge Prescriptions    SUCRALFATE (CARAFATE) 1 G TABLET    Take 1 tablet (1 g total) by mouth 4 (four) times a day       Start Date: 10/5/2022 End Date: --       Order Dose: 1 g       Quantity: 30 tablet    Refills: 0           PDMP Review       Value Time User    PDMP Reviewed  Yes 6/6/2022  8:38 PM Robert Lambert DO          ED Provider  Electronically Signed by           Sarah Leyva PA-C  10/05/22 0903

## 2022-10-06 ENCOUNTER — TELEPHONE (OUTPATIENT)
Dept: GASTROENTEROLOGY | Facility: CLINIC | Age: 57
End: 2022-10-06

## 2022-10-06 NOTE — TELEPHONE ENCOUNTER
Patients GI provider:  Dr Lucy Soria    Number to return call: [phone]     Reason for call: Pt called and stated he was at the ER and need to schedule a repeat  EGD thank you     Scheduled procedure/appointment date if applicable: n/a

## 2022-10-07 NOTE — TELEPHONE ENCOUNTER
Scheduled by MM on 10/06/22    Scheduled date of EGD(as of today):10 13 22  Physician performing EGD:dr malagon  Location of EGD:[facility]  Instructions reviewed with patient by:  Clearances:

## 2022-10-12 RX ORDER — SODIUM CHLORIDE 9 MG/ML
125 INJECTION, SOLUTION INTRAVENOUS CONTINUOUS
Status: CANCELLED | OUTPATIENT
Start: 2022-10-12

## 2022-10-13 ENCOUNTER — HOSPITAL ENCOUNTER (OUTPATIENT)
Dept: GASTROENTEROLOGY | Facility: HOSPITAL | Age: 57
Setting detail: OUTPATIENT SURGERY
End: 2022-10-13
Attending: INTERNAL MEDICINE
Payer: COMMERCIAL

## 2022-10-13 ENCOUNTER — ANESTHESIA EVENT (OUTPATIENT)
Dept: GASTROENTEROLOGY | Facility: HOSPITAL | Age: 57
End: 2022-10-13

## 2022-10-13 ENCOUNTER — ANESTHESIA (OUTPATIENT)
Dept: GASTROENTEROLOGY | Facility: HOSPITAL | Age: 57
End: 2022-10-13

## 2022-10-13 VITALS
OXYGEN SATURATION: 98 % | DIASTOLIC BLOOD PRESSURE: 75 MMHG | SYSTOLIC BLOOD PRESSURE: 117 MMHG | RESPIRATION RATE: 15 BRPM | BODY MASS INDEX: 28.92 KG/M2 | WEIGHT: 202 LBS | HEART RATE: 78 BPM | TEMPERATURE: 97.8 F | HEIGHT: 70 IN

## 2022-10-13 DIAGNOSIS — R11.2 NAUSEA AND VOMITING, UNSPECIFIED VOMITING TYPE: ICD-10-CM

## 2022-10-13 PROCEDURE — 43239 EGD BIOPSY SINGLE/MULTIPLE: CPT | Performed by: INTERNAL MEDICINE

## 2022-10-13 PROCEDURE — 88305 TISSUE EXAM BY PATHOLOGIST: CPT | Performed by: STUDENT IN AN ORGANIZED HEALTH CARE EDUCATION/TRAINING PROGRAM

## 2022-10-13 PROCEDURE — 88342 IMHCHEM/IMCYTCHM 1ST ANTB: CPT | Performed by: STUDENT IN AN ORGANIZED HEALTH CARE EDUCATION/TRAINING PROGRAM

## 2022-10-13 RX ORDER — LIDOCAINE HYDROCHLORIDE 20 MG/ML
INJECTION, SOLUTION EPIDURAL; INFILTRATION; INTRACAUDAL; PERINEURAL AS NEEDED
Status: DISCONTINUED | OUTPATIENT
Start: 2022-10-13 | End: 2022-10-13

## 2022-10-13 RX ORDER — SODIUM CHLORIDE 9 MG/ML
125 INJECTION, SOLUTION INTRAVENOUS CONTINUOUS
Status: DISCONTINUED | OUTPATIENT
Start: 2022-10-13 | End: 2022-10-17 | Stop reason: HOSPADM

## 2022-10-13 RX ORDER — PROPOFOL 10 MG/ML
INJECTION, EMULSION INTRAVENOUS AS NEEDED
Status: DISCONTINUED | OUTPATIENT
Start: 2022-10-13 | End: 2022-10-13

## 2022-10-13 RX ADMIN — PROPOFOL 160 MG: 10 INJECTION, EMULSION INTRAVENOUS at 13:01

## 2022-10-13 RX ADMIN — SODIUM CHLORIDE 125 ML/HR: 0.9 INJECTION, SOLUTION INTRAVENOUS at 12:31

## 2022-10-13 RX ADMIN — LIDOCAINE HYDROCHLORIDE 100 MG: 20 INJECTION, SOLUTION EPIDURAL; INFILTRATION; INTRACAUDAL; PERINEURAL at 13:01

## 2022-10-13 RX ADMIN — PROPOFOL 50 MG: 10 INJECTION, EMULSION INTRAVENOUS at 13:06

## 2022-10-13 RX ADMIN — PROPOFOL 40 MG: 10 INJECTION, EMULSION INTRAVENOUS at 13:03

## 2022-10-13 NOTE — ANESTHESIA PREPROCEDURE EVALUATION
Review of Systems/Medical History  Patient summary reviewed  Chart reviewed  Cardiovascular  Hyperlipidemia, Hypertension ,    Pulmonary  Negative pulmonary ROS        GI/Hepatic      Comment: Nausea and vomiting     Negative  ROS        Endo/Other  History of thyroid disease , hypothyroidism,      GYN       Hematology  Negative hematology ROS      Musculoskeletal  Negative musculoskeletal ROS        Neurology  Negative neurology ROS      Psychology   Anxiety,              Physical Exam    Airway    Mallampati score: II  TM Distance: >3 FB  Neck ROM: full     Dental   No notable dental hx     Cardiovascular  Rhythm: regular, Rate: normal, Cardiovascular exam normal    Pulmonary  Pulmonary exam normal Breath sounds clear to auscultation,     Other Findings        Anesthesia Plan  ASA Score- 2     Anesthesia Type- IV sedation with anesthesia with ASA Monitors  Additional Monitors:   Airway Plan:     Comment: GA prn  Plan Factors-    Chart reviewed  Patient summary reviewed  Patient not instructed to abstain from smoking on day of procedure  Patient did not smoke on day of surgery  Induction- intravenous  Postoperative Plan-     Informed Consent- Anesthetic plan and risks discussed with patient  I personally reviewed this patient with the CRNA  Discussed and agreed on the Anesthesia Plan with the CRNA  Jg Madrigal

## 2022-10-13 NOTE — ANESTHESIA POSTPROCEDURE EVALUATION
Post-Op Assessment Note    CV Status:  Stable    Pain management: adequate     Mental Status:  Alert and awake   Hydration Status:  Euvolemic   PONV Controlled:  Controlled   Airway Patency:  Patent      Post Op Vitals Reviewed: Yes      Staff: Anesthesiologist, CRNA         No complications documented      BP      Temp      Pulse     Resp      SpO2      /75   Pulse 78   Temp 97 8 °F (36 6 °C) (Temporal)   Resp 15   Ht 5' 10" (1 778 m)   Wt 91 6 kg (202 lb)   SpO2 98%   BMI 28 98 kg/m²

## 2022-10-13 NOTE — H&P
History and Physical -  [facility]  Tariq Sifuentes 59 y o  male MRN: [id number]    HPI: Tariq Sifuentes is a 59y o  year old male with hx of marijuana use, hypothyroidism, HTN, cervical radiculopathy on chronic opioids, gastritis on famotidine, recurrent nausea/emesis of unclear etiology presenting for EGD  Prior EGD in 2019 with Dr Rupinder Iqbal for similar indication  At that time small hiatal hernia noted, esophageal, gastric, and duodenal biopsies taken (all normal)  Has had recurrent ED visits for similar symptoms, generally improves with sucralfate or maalox  Scheduled for EGD by provider in ED per patient  Has not seen GI as an outpt     Last Hg   Lab Results   Component Value Date    HGB 16 1 10/05/2022     Last INR No results found for: INR    Review of Systems    Historical Information   Past Medical History:   Diagnosis Date   • Abnormal glucose     last assessed-12/5/2013   • Acute infective pharyngitis     last assessed-5/19/2013   • Disease of thyroid gland    • Elevated blood pressure reading without diagnosis of hypertension     last assessed-12/5/2013   • Enlarged prostate     last assessed-12/2/2013   • Exposure to STD     last assessed-3/30/2016   • Hypertension    • Injury of blood vessel at forearm level     last assessed-3/30/2013     Past Surgical History:   Procedure Laterality Date   • NO PAST SURGERIES       Social History   Social History     Substance and Sexual Activity   Alcohol Use Yes    Comment: socially     Social History     Substance and Sexual Activity   Drug Use No     Social History     Tobacco Use   Smoking Status Never Smoker   Smokeless Tobacco Never Used     Family History   Problem Relation Age of Onset   • No Known Problems Mother    • Kidney failure Father        Meds/Allergies     (Not in a hospital admission)      No Known Allergies    Objective     /92   Pulse 89   Temp 97 8 °F (36 6 °C) (Temporal)   Resp 18   Ht 5' 10" (1 778 m)   Wt 91 6 kg (202 lb)   SpO2 97%   BMI 28 98 kg/m²     PHYSICAL EXAM    Gen: NAD  CV: RRR  CHEST: Clear  ABD: soft, NT/ND  EXT: no edema  Neuro: AAO    ASSESSMENT/PLAN:  This is a 59y o  year old male here for EGD for investigation into etiology of recurrent nausea, emesis       Plan/Procedure: EGD

## 2022-10-19 ENCOUNTER — TELEPHONE (OUTPATIENT)
Dept: FAMILY MEDICINE CLINIC | Facility: CLINIC | Age: 57
End: 2022-10-19

## 2022-10-19 ENCOUNTER — TELEMEDICINE (OUTPATIENT)
Dept: FAMILY MEDICINE CLINIC | Facility: CLINIC | Age: 57
End: 2022-10-19
Payer: COMMERCIAL

## 2022-10-19 DIAGNOSIS — U07.1 COVID-19: Primary | ICD-10-CM

## 2022-10-19 PROCEDURE — 99213 OFFICE O/P EST LOW 20 MIN: CPT | Performed by: FAMILY MEDICINE

## 2022-10-19 NOTE — TELEPHONE ENCOUNTER
Called pt, pt advised that Covid is a viral infection and antibiotics will be ineffective and will not be prescribed  Advised that he will likely need to start antiviral treatment  Virtual visit rescheduled for today with Dr Ciara Fontenot

## 2022-10-19 NOTE — TELEPHONE ENCOUNTER
Please call patient, please advise him that Covid is a viral infection and antibiotics will be ineffective for him and will not be prescribed for him  He will likely need to start antiviral treatment  It would be best for him to be scheduled today to avoid any delay in starting this treatment  Schedule him with anyone who has availability today

## 2022-10-19 NOTE — PROGRESS NOTES
COVID-19 Outpatient Progress Note    Assessment/Plan:    Problem List Items Addressed This Visit    None     Visit Diagnoses     COVID-19    -  Primary         Disposition:     Patient has asymptomatic or mild COVID-19 infection  Based off CDC guidelines, they were recommended to isolate for 5 days  If they are asymptomatic or symptoms are improving with no fevers in the past 24 hours, isolation may be ended followed by 5 days of wearing a mask when around othes to minimize risk of infecting others  If still have a fever or other symptoms have not improved, continue to isolate until they improve  Regardless of when they end isolation, avoid being around people who are more likely to get very sick from COVID-19 until at least day 11  Discussed symptom directed medication options with patient  Discussed vitamin D, vitamin C, and/or zinc supplementation with patient  I have spent 15 minutes directly with the patient  Greater than 50% of this time was spent in counseling/coordination of care regarding: diagnostic results, prognosis, risks and benefits of treatment options, instructions for management, patient and family education and importance of treatment compliance  Encounter provider: Stephanie Dsouza DO     Provider located at: [address] [place] [address] [address]  [address] [facility] 100  [address] [place] [address]  [phone]     Recent Visits  No visits were found meeting these conditions  Showing recent visits within past 7 days and meeting all other requirements  Today's Visits  Date Type Provider Dept   10/19/22 Telemedicine Stephanie Dsouza DO [address] [facility]   [facility] and meeting all other requirements  Future Appointments  No visits were found meeting these conditions    Showing future appointments within next 150 days and meeting all other requirements       Patient agrees to participate in a virtual check in via telephone or video visit instead of presenting to the office to address urgent/immediate medical needs  Patient is aware this is a billable service  He acknowledged consent and understanding of privacy and security of the video platform  The patient has agreed to participate and understands they can discontinue the visit at any time  After connecting through [facility], the patient was identified by name and date of birth  Greg Goldsmith was informed that this was a telemedicine visit and that the exam was being conducted confidentially over secure lines  Greg Goldsmith acknowledged consent and understanding of privacy and security of the telemedicine visit  I informed the patient that I have reviewed his record in Epic and presented the opportunity for him to ask any questions regarding the visit today  The patient agreed to participate  Verification of patient location:  Patient is located in the following state in which I hold an active license: [place]    Subjective:   Greg Goldsmith is a 59 y o  male who has been screened for COVID-19  Symptom change since last report: improving  Patient's symptoms include fatigue, nasal congestion and cough  - Date of positive COVID-19 test: 10/19/2022  Type of test: Home antigen  COVID-19 vaccination status: Fully vaccinated with booster    Ryan Arroyo has been staying home and has isolated themselves in his home  He is taking care to not share personal items and is cleaning all surfaces that are touched often, like counters, tabletops, and doorknobs using household cleaning sprays or wipes  He is wearing a mask when he leaves his room  No results found for: Franck Faria, [address] [facility] [address], CORONAVIRUSR, [place], [address] [facility]    Review of Systems   Constitutional: Positive for fatigue  HENT: Positive for congestion  Respiratory: Positive for cough        Current Outpatient Medications on File Prior to Visit   Medication Sig   • amLODIPine (NORVASC) 5 mg tablet Take 1 tablet (5 mg total) by mouth daily   • diazepam (VALIUM) 5 mg tablet Take 1 tablet (5 mg total) by mouth daily as needed for anxiety   • famotidine (PEPCID) 20 mg tablet Take 1 tablet (20 mg total) by mouth 2 (two) times a day for 7 days   • levothyroxine 137 mcg tablet Take 1 tablet (137 mcg total) by mouth daily   • Lidocaine Viscous HCl (XYLOCAINE) 2 % mucosal solution Swish and spit 15 mL 4 (four) times a day as needed for mouth pain or discomfort   • lisinopril (ZESTRIL) 20 mg tablet Take 1 tablet (20 mg total) by mouth daily   • naloxone (NARCAN) 4 mg/0 1 mL nasal spray Administer 1 spray into a nostril  If breathing does not return to normal or if breathing difficulty resumes after 2-3 minutes, give another dose in the other nostril using a new spray  • ondansetron (Zofran ODT) 4 mg disintegrating tablet Take 1 tablet (4 mg total) by mouth every 6 (six) hours as needed for nausea or vomiting   • oxyCODONE (ROXICODONE) 10 MG TABS Take 1 tablet (10 mg total) by mouth every 8 (eight) hours as needed for moderate pain Max Daily Amount: 30 mg   • sucralfate (CARAFATE) 1 g tablet Take 1 tablet (1 g total) by mouth 4 (four) times a day       Objective: There were no vitals taken for this visit  Physical Exam  Constitutional:       General: He is not in acute distress  Appearance: He is well-developed  He is not diaphoretic  HENT:      Head: Atraumatic  Eyes:      Pupils: Pupils are equal, round, and reactive to light  Pulmonary:      Effort: Pulmonary effort is normal  No respiratory distress  Musculoskeletal:      Cervical back: Normal range of motion  Skin:     Coloration: Skin is not pale  Findings: No rash  Psychiatric:         Behavior: Behavior normal          Thought Content:  Thought content normal          Judgment: Judgment normal        Malachy Anger, DO

## 2022-10-19 NOTE — TELEPHONE ENCOUNTER
Spoke with pt last night he called to change appt Thursday to virtual due to covid positive Tuesday  sxs started Monday he feels like he has bronchitis was wondering about abx  Sorry thought I did this last night   Please advise

## 2022-10-20 ENCOUNTER — TELEPHONE (OUTPATIENT)
Dept: FAMILY MEDICINE CLINIC | Facility: CLINIC | Age: 57
End: 2022-10-20

## 2022-10-20 DIAGNOSIS — U07.1 COVID-19: Primary | ICD-10-CM

## 2022-10-20 RX ORDER — NIRMATRELVIR AND RITONAVIR 300-100 MG
3 KIT ORAL 2 TIMES DAILY
Qty: 30 TABLET | Refills: 0 | Status: SHIPPED | OUTPATIENT
Start: 2022-10-20 | End: 2022-10-25

## 2022-10-20 NOTE — TELEPHONE ENCOUNTER
Pt is complaining of a very sore throat Britta red color  Dr Jared Bush did agreed to send paxlovid for pt to ride aid in [place]  Pt would like to know if there is any thing else he can take for his sore throat  Pt appt with Dr Jared Bush was canceled today and pt was instructed to follow up with gastro for his endoscopy f/u

## 2022-10-25 DIAGNOSIS — A04.8 H. PYLORI INFECTION: Primary | ICD-10-CM

## 2022-10-25 PROCEDURE — 88342 IMHCHEM/IMCYTCHM 1ST ANTB: CPT | Performed by: STUDENT IN AN ORGANIZED HEALTH CARE EDUCATION/TRAINING PROGRAM

## 2022-10-25 PROCEDURE — 88305 TISSUE EXAM BY PATHOLOGIST: CPT | Performed by: STUDENT IN AN ORGANIZED HEALTH CARE EDUCATION/TRAINING PROGRAM

## 2022-10-25 RX ORDER — OMEPRAZOLE 40 MG/1
40 CAPSULE, DELAYED RELEASE ORAL 2 TIMES DAILY
Qty: 28 CAPSULE | Refills: 0 | Status: SHIPPED | OUTPATIENT
Start: 2022-10-25 | End: 2022-11-08

## 2022-10-25 RX ORDER — LEVOFLOXACIN 500 MG/1
500 TABLET, FILM COATED ORAL EVERY 24 HOURS
Qty: 14 TABLET | Refills: 0 | Status: SHIPPED | OUTPATIENT
Start: 2022-10-25 | End: 2022-11-08

## 2022-10-25 RX ORDER — AMOXICILLIN 500 MG/1
1000 CAPSULE ORAL EVERY 12 HOURS SCHEDULED
Qty: 56 CAPSULE | Refills: 0 | Status: SHIPPED | OUTPATIENT
Start: 2022-10-25 | End: 2022-11-08

## 2022-10-30 DIAGNOSIS — I16.0 HYPERTENSIVE URGENCY: ICD-10-CM

## 2022-10-31 RX ORDER — LISINOPRIL 20 MG/1
TABLET ORAL
Qty: 90 TABLET | Refills: 1 | Status: SHIPPED | OUTPATIENT
Start: 2022-10-31

## 2022-11-14 ENCOUNTER — OFFICE VISIT (OUTPATIENT)
Dept: FAMILY MEDICINE CLINIC | Facility: CLINIC | Age: 57
End: 2022-11-14

## 2022-11-14 VITALS
HEIGHT: 70 IN | OXYGEN SATURATION: 96 % | BODY MASS INDEX: 30.78 KG/M2 | TEMPERATURE: 98.9 F | DIASTOLIC BLOOD PRESSURE: 70 MMHG | WEIGHT: 215 LBS | HEART RATE: 90 BPM | SYSTOLIC BLOOD PRESSURE: 132 MMHG

## 2022-11-14 DIAGNOSIS — J06.9 URI WITH COUGH AND CONGESTION: Primary | ICD-10-CM

## 2022-11-14 RX ORDER — AMOXICILLIN AND CLAVULANATE POTASSIUM 875; 125 MG/1; MG/1
1 TABLET, FILM COATED ORAL EVERY 12 HOURS SCHEDULED
Qty: 14 TABLET | Refills: 0 | Status: SHIPPED | OUTPATIENT
Start: 2022-11-14 | End: 2022-11-21

## 2022-11-14 NOTE — ASSESSMENT & PLAN NOTE
Increase fluid intake and get plenty of rest     Rule out COVID/flu  Please start augmentin which was sent to your pharmacy  If you have nasal congestions, post nasal drip, sinus pressure, runny nose you may try the following:        Clearing your sinuses in a nice steamy shower or in bathroom filled with steamy air  Nasal saline rinses every 1-2 hours while awake may also help decrease nasal congestion, drainage  You may try Mucinex D 12 hour version  1/2 to 1 tablet one to two times a day as needed for nasal congestion, runny nose, post nasal drip, or cough  Most upper respiratory symptoms start to improve after 7-10 days but may take a few weeks to completely resolve  You may also use Ibuprofen or Acetaminophen containing product for symptom relief  Follow up in 1 week if your symptoms persist or worsen  Please call the office if you have any questions  The patient verbalized understanding of treatment plan

## 2022-11-14 NOTE — PROGRESS NOTES
Assessment/Plan:    1  URI with cough and congestion  Assessment & Plan:  Increase fluid intake and get plenty of rest     Rule out COVID/flu  Please start augmentin which was sent to your pharmacy  If you have nasal congestions, post nasal drip, sinus pressure, runny nose you may try the following:        Clearing your sinuses in a nice steamy shower or in bathroom filled with steamy air  Nasal saline rinses every 1-2 hours while awake may also help decrease nasal congestion, drainage  You may try Mucinex D 12 hour version  1/2 to 1 tablet one to two times a day as needed for nasal congestion, runny nose, post nasal drip, or cough  Most upper respiratory symptoms start to improve after 7-10 days but may take a few weeks to completely resolve  You may also use Ibuprofen or Acetaminophen containing product for symptom relief  Follow up in 1 week if your symptoms persist or worsen  Please call the office if you have any questions  The patient verbalized understanding of treatment plan  Orders:  -     amoxicillin-clavulanate (AUGMENTIN) 875-125 mg per tablet; Take 1 tablet by mouth every 12 (twelve) hours for 7 days  -     Covid/Flu- Office Collect      Subjective:      Patient ID: Alissa Coats is a 59 y o  male  HPI    Patient presenting with sick symptoms that started yesterday  He has cough and congestion  He is taking mucinex and delsym  He has wheezing with coughing  Denies shortness of breath  He has mucous as well  Denies sick contacts       The following portions of the patient's history were reviewed and updated as appropriate: allergies, current medications, past family history, past medical history, past social history, past surgical history, and problem list       Current Outpatient Medications:   •  amLODIPine (NORVASC) 5 mg tablet, Take 1 tablet (5 mg total) by mouth daily, Disp: 90 tablet, Rfl: 1  •  amoxicillin-clavulanate (AUGMENTIN) 875-125 mg per tablet, Take 1 tablet by mouth every 12 (twelve) hours for 7 days, Disp: 14 tablet, Rfl: 0  •  diazepam (VALIUM) 5 mg tablet, Take 1 tablet (5 mg total) by mouth daily as needed for anxiety, Disp: 60 tablet, Rfl: 0  •  levothyroxine 137 mcg tablet, Take 1 tablet (137 mcg total) by mouth daily, Disp: 90 tablet, Rfl: 1  •  Lidocaine Viscous HCl (XYLOCAINE) 2 % mucosal solution, Swish and spit 15 mL 4 (four) times a day as needed for mouth pain or discomfort, Disp: 100 mL, Rfl: 0  •  lisinopril (ZESTRIL) 20 mg tablet, TAKE ONE TABLET BY MOUTH EVERY DAY, Disp: 90 tablet, Rfl: 1  •  naloxone (NARCAN) 4 mg/0 1 mL nasal spray, Administer 1 spray into a nostril  If breathing does not return to normal or if breathing difficulty resumes after 2-3 minutes, give another dose in the other nostril using a new spray , Disp: 1 each, Rfl: 1  •  omeprazole (PriLOSEC) 40 MG capsule, Take 1 capsule (40 mg total) by mouth 2 (two) times a day for 14 days (Patient taking differently: Take 40 mg by mouth daily), Disp: 28 capsule, Rfl: 0  •  ondansetron (Zofran ODT) 4 mg disintegrating tablet, Take 1 tablet (4 mg total) by mouth every 6 (six) hours as needed for nausea or vomiting, Disp: 20 tablet, Rfl: 0  •  oxyCODONE (ROXICODONE) 10 MG TABS, Take 1 tablet (10 mg total) by mouth every 8 (eight) hours as needed for moderate pain Max Daily Amount: 30 mg, Disp: 60 tablet, Rfl: 0  •  famotidine (PEPCID) 20 mg tablet, Take 1 tablet (20 mg total) by mouth 2 (two) times a day for 7 days, Disp: 14 tablet, Rfl: 0      Review of Systems   Constitutional: Positive for fatigue  Negative for chills and fever  HENT: Positive for congestion  Negative for ear pain and sore throat  Eyes: Negative for pain and visual disturbance  Respiratory: Positive for cough and wheezing  Negative for shortness of breath  Cardiovascular: Negative for chest pain and palpitations  Gastrointestinal: Negative for abdominal pain and vomiting     Genitourinary: Negative for dysuria and hematuria  Musculoskeletal: Negative for arthralgias and back pain  Skin: Negative for color change and rash  Neurological: Negative for seizures and syncope  All other systems reviewed and are negative  Objective:      /70 (BP Location: Left arm, Patient Position: Sitting, Cuff Size: Large)   Pulse 90   Temp 98 9 °F (37 2 °C) (Temporal)   Ht 5' 10" (1 778 m)   Wt 97 5 kg (215 lb)   SpO2 96%   BMI 30 85 kg/m²          Physical Exam  Vitals and nursing note reviewed  Constitutional:       General: He is not in acute distress  Appearance: Normal appearance  He is not toxic-appearing  HENT:      Head: Normocephalic and atraumatic  Right Ear: Tympanic membrane, ear canal and external ear normal       Left Ear: Tympanic membrane, ear canal and external ear normal       Nose: Congestion present  Eyes:      Extraocular Movements: Extraocular movements intact  Conjunctiva/sclera: Conjunctivae normal       Pupils: Pupils are equal, round, and reactive to light  Cardiovascular:      Rate and Rhythm: Normal rate and regular rhythm  Heart sounds: Normal heart sounds  No murmur heard  Pulmonary:      Effort: Pulmonary effort is normal  No respiratory distress  Breath sounds: Normal breath sounds  No wheezing  Musculoskeletal:      Cervical back: Normal range of motion  Lymphadenopathy:      Cervical: Cervical adenopathy present  Skin:     General: Skin is warm  Neurological:      General: No focal deficit present  Mental Status: He is alert and oriented to person, place, and time  Mental status is at baseline  Psychiatric:         Mood and Affect: Mood normal          Behavior: Behavior normal          Thought Content:  Thought content normal          Judgment: Judgment normal

## 2022-11-15 DIAGNOSIS — U07.1 COVID-19: Primary | ICD-10-CM

## 2022-11-15 LAB
FLUAV RNA RESP QL NAA+PROBE: NEGATIVE
FLUBV RNA RESP QL NAA+PROBE: NEGATIVE
SARS-COV-2 RNA RESP QL NAA+PROBE: POSITIVE

## 2022-11-15 RX ORDER — NIRMATRELVIR AND RITONAVIR 300-100 MG
3 KIT ORAL 2 TIMES DAILY
Qty: 30 TABLET | Refills: 0 | Status: SHIPPED | OUTPATIENT
Start: 2022-11-15 | End: 2022-11-20

## 2022-11-29 ENCOUNTER — APPOINTMENT (OUTPATIENT)
Dept: LAB | Facility: CLINIC | Age: 57
End: 2022-11-29

## 2022-11-29 DIAGNOSIS — E03.9 HYPOTHYROIDISM, UNSPECIFIED TYPE: ICD-10-CM

## 2022-11-29 DIAGNOSIS — Z72.52 HIGH RISK HOMOSEXUAL BEHAVIOR: ICD-10-CM

## 2022-11-29 DIAGNOSIS — E78.2 MIXED HYPERLIPIDEMIA: ICD-10-CM

## 2022-11-29 LAB
CHOLEST SERPL-MCNC: 270 MG/DL
HDLC SERPL-MCNC: 63 MG/DL
LDLC SERPL CALC-MCNC: 164 MG/DL (ref 0–100)
TRIGL SERPL-MCNC: 215 MG/DL
TSH SERPL DL<=0.05 MIU/L-ACNC: 1.91 UIU/ML (ref 0.45–4.5)

## 2022-11-30 LAB — HIV 1+2 AB+HIV1 P24 AG SERPL QL IA: NORMAL

## 2022-12-05 DIAGNOSIS — I16.0 HYPERTENSIVE URGENCY: ICD-10-CM

## 2022-12-05 RX ORDER — AMLODIPINE BESYLATE 5 MG/1
TABLET ORAL
Qty: 90 TABLET | Refills: 1 | Status: SHIPPED | OUTPATIENT
Start: 2022-12-05

## 2022-12-13 ENCOUNTER — OFFICE VISIT (OUTPATIENT)
Dept: FAMILY MEDICINE CLINIC | Facility: CLINIC | Age: 57
End: 2022-12-13

## 2022-12-13 VITALS
BODY MASS INDEX: 30.67 KG/M2 | DIASTOLIC BLOOD PRESSURE: 80 MMHG | HEIGHT: 70 IN | HEART RATE: 89 BPM | RESPIRATION RATE: 18 BRPM | WEIGHT: 214.2 LBS | TEMPERATURE: 98 F | SYSTOLIC BLOOD PRESSURE: 118 MMHG | OXYGEN SATURATION: 96 %

## 2022-12-13 DIAGNOSIS — M54.12 CERVICAL RADICULOPATHY: Primary | ICD-10-CM

## 2022-12-13 DIAGNOSIS — E03.9 HYPOTHYROIDISM, UNSPECIFIED TYPE: ICD-10-CM

## 2022-12-13 DIAGNOSIS — I16.0 HYPERTENSIVE URGENCY: ICD-10-CM

## 2022-12-13 DIAGNOSIS — K29.50 CHRONIC GASTRITIS WITHOUT BLEEDING, UNSPECIFIED GASTRITIS TYPE: ICD-10-CM

## 2022-12-13 RX ORDER — LISINOPRIL 20 MG/1
20 TABLET ORAL DAILY
Qty: 90 TABLET | Refills: 1 | Status: SHIPPED | OUTPATIENT
Start: 2022-12-13

## 2022-12-13 RX ORDER — OXYCODONE HYDROCHLORIDE 10 MG/1
10 TABLET ORAL EVERY 8 HOURS PRN
Qty: 60 TABLET | Refills: 0 | Status: SHIPPED | OUTPATIENT
Start: 2022-12-13

## 2022-12-13 NOTE — PROGRESS NOTES
Assessment/Plan:   1  Cervical radiculopathy  Reviewed patient symptoms today  At this time, symptoms appear stable  He denies any recent exacerbations  He has been taking his oxycodone regularly  PDMP reviewed, no abnormality seen today  Patient was advised that he needs to complete a [facility] drug screen  He states that he was unable to give a urine sample at his visit however he stated that he would return during the day today to complete this urine drug screen as he is in town  Patient was advised to follow-up today to complete this  2  Hypertensive urgency  Blood pressure appears stable  Continue with current treatment of lisinopril as well as amlodipine    3  Chronic gastritis without bleeding, unspecified gastritis type  Patient completed treatment with antibiotics for H  pylori infection  He is due for H  pylori stool antigen testing  Continue with his current treatment  4  Hypothyroidism, unspecified type  Clinically stable  thyroid function testing stable today     Continue with current dose of levothyroxine  Diagnoses and all orders for this visit:    Cervical radiculopathy    Hypertensive urgency          Subjective:       Chief Complaint   Patient presents with   • Follow-up     IZZY Onofre review       Patient ID: Clayton Garcia is a 59 y o  male today for a follow-up on his chronic conditions  He has cervical to colopathy, hypertensive urgency, chronic gastritis as well as hypothyroidism  He has been taking his medications regularly  He denies adverse reactions with his medications  He previously had an upper endoscopy  He was on treatment for H  pylori infection  HPI    Review of Systems   Constitutional: Negative for activity change, chills, fatigue and fever  HENT: Negative for congestion, ear pain, sinus pressure and sore throat  Eyes: Negative for redness, itching and visual disturbance  Respiratory: Negative for cough and shortness of breath  Cardiovascular: Negative for chest pain and palpitations  Gastrointestinal: Negative for abdominal pain, diarrhea and nausea  Endocrine: Negative for cold intolerance and heat intolerance  Genitourinary: Negative for dysuria, flank pain and frequency  Musculoskeletal: Negative for arthralgias, back pain, gait problem and myalgias  Skin: Negative for color change  Allergic/Immunologic: Negative for environmental allergies  Neurological: Negative for dizziness, numbness and headaches  Psychiatric/Behavioral: Negative for behavioral problems and sleep disturbance  The following portions of the patient's history were reviewed and updated as appropriate : past family history, past medical history, past social history and past surgical history  Current Outpatient Medications:   •  amLODIPine (NORVASC) 5 mg tablet, TAKE ONE TABLET BY MOUTH EVERY DAY, Disp: 90 tablet, Rfl: 1  •  diazepam (VALIUM) 5 mg tablet, Take 1 tablet (5 mg total) by mouth daily as needed for anxiety, Disp: 60 tablet, Rfl: 0  •  levothyroxine 137 mcg tablet, Take 1 tablet (137 mcg total) by mouth daily, Disp: 90 tablet, Rfl: 1  •  Lidocaine Viscous HCl (XYLOCAINE) 2 % mucosal solution, Swish and spit 15 mL 4 (four) times a day as needed for mouth pain or discomfort, Disp: 100 mL, Rfl: 0  •  lisinopril (ZESTRIL) 20 mg tablet, TAKE ONE TABLET BY MOUTH EVERY DAY, Disp: 90 tablet, Rfl: 1  •  naloxone (NARCAN) 4 mg/0 1 mL nasal spray, Administer 1 spray into a nostril   If breathing does not return to normal or if breathing difficulty resumes after 2-3 minutes, give another dose in the other nostril using a new spray , Disp: 1 each, Rfl: 1  •  ondansetron (Zofran ODT) 4 mg disintegrating tablet, Take 1 tablet (4 mg total) by mouth every 6 (six) hours as needed for nausea or vomiting, Disp: 20 tablet, Rfl: 0  •  oxyCODONE (ROXICODONE) 10 MG TABS, Take 1 tablet (10 mg total) by mouth every 8 (eight) hours as needed for moderate pain Max Daily Amount: 30 mg, Disp: 60 tablet, Rfl: 0  •  famotidine (PEPCID) 20 mg tablet, Take 1 tablet (20 mg total) by mouth 2 (two) times a day for 7 days (Patient not taking: Reported on 12/13/2022), Disp: 14 tablet, Rfl: 0  •  omeprazole (PriLOSEC) 40 MG capsule, Take 1 capsule (40 mg total) by mouth 2 (two) times a day for 14 days (Patient taking differently: Take 40 mg by mouth daily), Disp: 28 capsule, Rfl: 0         Objective:         Vitals:    12/13/22 1228   BP: 118/80   Pulse: 89   Resp: 18   Temp: 98 °F (36 7 °C)   TempSrc: Tympanic   SpO2: 96%   Weight: 97 2 kg (214 lb 3 2 oz)   Height: 5' 10" (1 778 m)     Physical Exam  Vitals reviewed  Constitutional:       Appearance: He is well-developed  HENT:      Head: Normocephalic and atraumatic  Nose: Nose normal       Mouth/Throat:      Pharynx: No oropharyngeal exudate  Eyes:      General: No scleral icterus  Right eye: No discharge  Left eye: No discharge  Pupils: Pupils are equal, round, and reactive to light  Neck:      Trachea: No tracheal deviation  Cardiovascular:      Rate and Rhythm: Normal rate and regular rhythm  Pulses:           Dorsalis pedis pulses are 2+ on the right side and 2+ on the left side  Posterior tibial pulses are 2+ on the right side and 2+ on the left side  Heart sounds: Normal heart sounds  No murmur heard  No friction rub  No gallop  Pulmonary:      Effort: Pulmonary effort is normal  No respiratory distress  Breath sounds: Normal breath sounds  No wheezing or rales  Abdominal:      General: Bowel sounds are normal  There is no distension  Palpations: Abdomen is soft  Tenderness: There is no abdominal tenderness  There is no guarding or rebound  Musculoskeletal:         General: Normal range of motion  Cervical back: Normal range of motion and neck supple     Lymphadenopathy:      Head:      Right side of head: No submental or submandibular adenopathy  Left side of head: No submental or submandibular adenopathy  Cervical: No cervical adenopathy  Right cervical: No superficial, deep or posterior cervical adenopathy  Left cervical: No superficial, deep or posterior cervical adenopathy  Skin:     General: Skin is warm and dry  Findings: No erythema  Neurological:      Mental Status: He is alert and oriented to person, place, and time  Cranial Nerves: No cranial nerve deficit  Sensory: No sensory deficit  Psychiatric:         Mood and Affect: Mood is not anxious or depressed  Speech: Speech normal          Behavior: Behavior normal          Thought Content:  Thought content normal          Judgment: Judgment normal

## 2022-12-14 ENCOUNTER — TELEPHONE (OUTPATIENT)
Dept: FAMILY MEDICINE CLINIC | Facility: CLINIC | Age: 57
End: 2022-12-14

## 2022-12-14 NOTE — TELEPHONE ENCOUNTER
Lvm for patient to call office back to make an appointment  Patient needs to come in for a urine test as per Dr Anali Ortez to avoid interruption of his medications      Thank you

## 2022-12-14 NOTE — TELEPHONE ENCOUNTER
----- Message from Irma Mckeon DO sent at 12/14/2022  9:43 AM EST -----  Please call this patient, he was seen for his visit yesterday however was unable to give a urine [facility] drug screen  He indicated at his visit that he would return during the day yesterday to complete this however it does not appear that he came  This must be completed or this will jeopardize his opiate medication prescriptions

## 2022-12-16 ENCOUNTER — CLINICAL SUPPORT (OUTPATIENT)
Dept: FAMILY MEDICINE CLINIC | Facility: CLINIC | Age: 57
End: 2022-12-16

## 2022-12-16 DIAGNOSIS — F11.20 CONTINUOUS OPIOID DEPENDENCE (HCC): Primary | ICD-10-CM

## 2022-12-19 ENCOUNTER — NURSE TRIAGE (OUTPATIENT)
Dept: OTHER | Facility: OTHER | Age: 57
End: 2022-12-19

## 2022-12-19 NOTE — TELEPHONE ENCOUNTER
Regarding: question about freezing stool sample  ----- Message from [facility] sent at 12/19/2022  2:47 PM EST -----  "I have to give a stool sample and I would like to know if it must be frozen when I drop it off "

## 2022-12-19 NOTE — TELEPHONE ENCOUNTER
Patient calling to know if he should freeze stool sample prior to bringing it to the lab  Patient advised to call lab, please follow up  Reason for Disposition  • [1] Caller requesting NON-URGENT health information AND [2] PCP's office is the best resource    Answer Assessment - Initial Assessment Questions  1  REASON FOR CALL or QUESTION: "What is your reason for calling today?" or "How can I best help you?" or "What question do you have that I can help answer?"      Do I have to freeze my stool sample?     Protocols used: INFORMATION ONLY CALL - NO TRIAGE[facility]

## 2022-12-20 ENCOUNTER — TELEPHONE (OUTPATIENT)
Dept: FAMILY MEDICINE CLINIC | Facility: CLINIC | Age: 57
End: 2022-12-20

## 2022-12-20 NOTE — TELEPHONE ENCOUNTER
Prior auth started for Oxycodone thru covermymeds faxed to [facility] waiting determination will fax 1-5 days
 used

## 2022-12-29 LAB

## 2023-01-03 DIAGNOSIS — I16.0 HYPERTENSIVE URGENCY: ICD-10-CM

## 2023-01-03 DIAGNOSIS — R11.2 NAUSEA AND VOMITING: ICD-10-CM

## 2023-01-03 DIAGNOSIS — K29.70 GASTRITIS: ICD-10-CM

## 2023-01-03 RX ORDER — AMLODIPINE BESYLATE 5 MG/1
5 TABLET ORAL DAILY
Qty: 90 TABLET | Refills: 1 | Status: SHIPPED | OUTPATIENT
Start: 2023-01-03

## 2023-01-04 RX ORDER — FAMOTIDINE 20 MG/1
20 TABLET, FILM COATED ORAL 2 TIMES DAILY
Qty: 60 TABLET | Refills: 2 | Status: SHIPPED | OUTPATIENT
Start: 2023-01-04

## 2023-01-13 PROBLEM — J06.9 URI WITH COUGH AND CONGESTION: Status: RESOLVED | Noted: 2022-11-14 | Resolved: 2023-01-13

## 2023-01-20 DIAGNOSIS — M54.12 CERVICAL RADICULOPATHY: ICD-10-CM

## 2023-01-20 RX ORDER — OXYCODONE HYDROCHLORIDE 10 MG/1
10 TABLET ORAL EVERY 8 HOURS PRN
Qty: 60 TABLET | Refills: 0 | Status: SHIPPED | OUTPATIENT
Start: 2023-01-20

## 2023-02-22 DIAGNOSIS — I16.0 HYPERTENSIVE URGENCY: ICD-10-CM

## 2023-02-22 DIAGNOSIS — K29.00 ACUTE GASTRITIS WITHOUT HEMORRHAGE, UNSPECIFIED GASTRITIS TYPE: ICD-10-CM

## 2023-02-22 DIAGNOSIS — M54.12 CERVICAL RADICULOPATHY: ICD-10-CM

## 2023-02-22 DIAGNOSIS — E03.9 HYPOTHYROIDISM, UNSPECIFIED TYPE: ICD-10-CM

## 2023-02-22 DIAGNOSIS — F40.243 FEAR OF FLYING: ICD-10-CM

## 2023-02-23 RX ORDER — LEVOTHYROXINE SODIUM 137 UG/1
137 TABLET ORAL DAILY
Qty: 90 TABLET | Refills: 0 | Status: SHIPPED | OUTPATIENT
Start: 2023-02-23 | End: 2023-02-27 | Stop reason: SDUPTHER

## 2023-02-23 RX ORDER — OXYCODONE HYDROCHLORIDE 10 MG/1
10 TABLET ORAL EVERY 8 HOURS PRN
Qty: 60 TABLET | Refills: 0 | Status: SHIPPED | OUTPATIENT
Start: 2023-02-23

## 2023-02-23 RX ORDER — LISINOPRIL 20 MG/1
20 TABLET ORAL DAILY
Qty: 90 TABLET | Refills: 0 | Status: SHIPPED | OUTPATIENT
Start: 2023-02-23

## 2023-02-23 RX ORDER — NALOXONE HYDROCHLORIDE 4 MG/.1ML
SPRAY NASAL
Qty: 1 EACH | Refills: 0 | Status: SHIPPED | OUTPATIENT
Start: 2023-02-23

## 2023-02-23 RX ORDER — LIDOCAINE HYDROCHLORIDE 20 MG/ML
15 SOLUTION OROPHARYNGEAL 4 TIMES DAILY PRN
Qty: 100 ML | Refills: 0 | Status: SHIPPED | OUTPATIENT
Start: 2023-02-23

## 2023-02-23 RX ORDER — DIAZEPAM 5 MG/1
5 TABLET ORAL DAILY PRN
Qty: 60 TABLET | Refills: 0 | Status: SHIPPED | OUTPATIENT
Start: 2023-02-23 | End: 2023-03-25

## 2023-02-27 ENCOUNTER — TELEPHONE (OUTPATIENT)
Dept: FAMILY MEDICINE CLINIC | Facility: CLINIC | Age: 58
End: 2023-02-27

## 2023-02-27 DIAGNOSIS — E03.9 HYPOTHYROIDISM, UNSPECIFIED TYPE: ICD-10-CM

## 2023-02-27 RX ORDER — LEVOTHYROXINE SODIUM 137 UG/1
137 TABLET ORAL DAILY
Qty: 90 TABLET | Refills: 0 | Status: SHIPPED | OUTPATIENT
Start: 2023-02-27

## 2023-02-27 RX ORDER — LEVOTHYROXINE SODIUM 137 UG/1
137 TABLET ORAL DAILY
Qty: 90 TABLET | Refills: 0 | Status: SHIPPED | OUTPATIENT
Start: 2023-02-27 | End: 2023-02-27 | Stop reason: SDUPTHER

## 2023-02-27 NOTE — TELEPHONE ENCOUNTER
Pt called stating that he put in for a refill of his Levothyroxine via MyChart but that [address] [facility] was not an option  Refill was sent to [facility] in [place]  Pt requesting that this be cancelled and re-sent to [facility] on [facility] [address] [facility]  Pt would like a callback when this has been done

## 2023-03-24 DIAGNOSIS — K29.70 GASTRITIS: ICD-10-CM

## 2023-03-24 DIAGNOSIS — R11.2 NAUSEA AND VOMITING: ICD-10-CM

## 2023-03-24 DIAGNOSIS — M54.12 CERVICAL RADICULOPATHY: ICD-10-CM

## 2023-03-27 RX ORDER — FAMOTIDINE 20 MG/1
20 TABLET, FILM COATED ORAL 2 TIMES DAILY
Qty: 60 TABLET | Refills: 0 | Status: SHIPPED | OUTPATIENT
Start: 2023-03-27

## 2023-03-27 RX ORDER — OXYCODONE HYDROCHLORIDE 10 MG/1
10 TABLET ORAL EVERY 8 HOURS PRN
Qty: 60 TABLET | Refills: 0 | Status: SHIPPED | OUTPATIENT
Start: 2023-03-27

## 2023-05-04 DIAGNOSIS — M54.12 CERVICAL RADICULOPATHY: ICD-10-CM

## 2023-05-04 DIAGNOSIS — I16.0 HYPERTENSIVE URGENCY: ICD-10-CM

## 2023-05-04 DIAGNOSIS — E03.9 HYPOTHYROIDISM, UNSPECIFIED TYPE: ICD-10-CM

## 2023-05-04 DIAGNOSIS — F40.243 FEAR OF FLYING: ICD-10-CM

## 2023-05-05 ENCOUNTER — PATIENT MESSAGE (OUTPATIENT)
Dept: FAMILY MEDICINE CLINIC | Facility: CLINIC | Age: 58
End: 2023-05-05

## 2023-05-05 DIAGNOSIS — E03.9 HYPOTHYROIDISM, UNSPECIFIED TYPE: ICD-10-CM

## 2023-05-05 DIAGNOSIS — F40.243 FEAR OF FLYING: ICD-10-CM

## 2023-05-05 DIAGNOSIS — M54.12 CERVICAL RADICULOPATHY: ICD-10-CM

## 2023-05-05 DIAGNOSIS — I16.0 HYPERTENSIVE URGENCY: ICD-10-CM

## 2023-05-05 RX ORDER — DIAZEPAM 5 MG/1
5 TABLET ORAL DAILY PRN
Qty: 60 TABLET | Refills: 0 | Status: SHIPPED | OUTPATIENT
Start: 2023-05-05 | End: 2023-06-04

## 2023-05-05 RX ORDER — OXYCODONE HYDROCHLORIDE 10 MG/1
10 TABLET ORAL EVERY 8 HOURS PRN
Qty: 60 TABLET | Refills: 0 | Status: SHIPPED | OUTPATIENT
Start: 2023-05-05

## 2023-05-05 RX ORDER — LISINOPRIL 20 MG/1
20 TABLET ORAL DAILY
Qty: 90 TABLET | Refills: 0 | Status: SHIPPED | OUTPATIENT
Start: 2023-05-05 | End: 2023-05-05 | Stop reason: SDUPTHER

## 2023-05-05 RX ORDER — LEVOTHYROXINE SODIUM 137 UG/1
137 TABLET ORAL DAILY
Qty: 90 TABLET | Refills: 1 | Status: SHIPPED | OUTPATIENT
Start: 2023-05-05

## 2023-05-05 RX ORDER — LEVOTHYROXINE SODIUM 137 UG/1
137 TABLET ORAL DAILY
Qty: 90 TABLET | Refills: 0 | Status: CANCELLED | OUTPATIENT
Start: 2023-05-05

## 2023-05-05 RX ORDER — LEVOTHYROXINE SODIUM 137 UG/1
137 TABLET ORAL DAILY
Qty: 90 TABLET | Refills: 0 | Status: SHIPPED | OUTPATIENT
Start: 2023-05-05 | End: 2023-05-05 | Stop reason: SDUPTHER

## 2023-05-05 RX ORDER — OXYCODONE HYDROCHLORIDE 10 MG/1
10 TABLET ORAL EVERY 8 HOURS PRN
Qty: 60 TABLET | Refills: 0 | Status: SHIPPED | OUTPATIENT
Start: 2023-05-05 | End: 2023-05-05 | Stop reason: SDUPTHER

## 2023-05-05 RX ORDER — DIAZEPAM 5 MG/1
5 TABLET ORAL DAILY PRN
Qty: 60 TABLET | Refills: 0 | Status: SHIPPED | OUTPATIENT
Start: 2023-05-05 | End: 2023-05-05 | Stop reason: SDUPTHER

## 2023-05-05 RX ORDER — LISINOPRIL 20 MG/1
20 TABLET ORAL DAILY
Qty: 90 TABLET | Refills: 0 | Status: CANCELLED | OUTPATIENT
Start: 2023-05-05

## 2023-05-05 RX ORDER — LISINOPRIL 20 MG/1
20 TABLET ORAL DAILY
Qty: 90 TABLET | Refills: 1 | Status: SHIPPED | OUTPATIENT
Start: 2023-05-05

## 2023-05-05 NOTE — TELEPHONE ENCOUNTER
Patient called regarding his scripts that were called in earlier  He only received 30 tablets of Diazepam when he should have received 60  He said its cheaper for him that way  He also said he called [facility] for his Levothyroxine and [address] said they never received it

## 2023-05-05 NOTE — TELEPHONE ENCOUNTER
Diazapam and Oxy get sent to [facility], Lisinopril gets sent to [facility] and Levothyroxine gets sent to [facility] per patient

## 2023-05-23 DIAGNOSIS — K29.70 GASTRITIS: ICD-10-CM

## 2023-05-23 DIAGNOSIS — R11.2 NAUSEA AND VOMITING: ICD-10-CM

## 2023-05-23 RX ORDER — FAMOTIDINE 20 MG/1
20 TABLET, FILM COATED ORAL 2 TIMES DAILY
Qty: 120 TABLET | Refills: 0 | Status: SHIPPED | OUTPATIENT
Start: 2023-05-23

## 2023-06-30 DIAGNOSIS — I16.0 HYPERTENSIVE URGENCY: ICD-10-CM

## 2023-07-01 RX ORDER — AMLODIPINE BESYLATE 5 MG/1
5 TABLET ORAL DAILY
Qty: 90 TABLET | Refills: 1 | Status: SHIPPED | OUTPATIENT
Start: 2023-07-01

## 2023-07-06 ENCOUNTER — OFFICE VISIT (OUTPATIENT)
Dept: FAMILY MEDICINE CLINIC | Facility: CLINIC | Age: 58
End: 2023-07-06
Payer: COMMERCIAL

## 2023-07-06 VITALS
TEMPERATURE: 98.4 F | WEIGHT: 220.4 LBS | BODY MASS INDEX: 31.55 KG/M2 | SYSTOLIC BLOOD PRESSURE: 130 MMHG | HEIGHT: 70 IN | OXYGEN SATURATION: 98 % | HEART RATE: 73 BPM | DIASTOLIC BLOOD PRESSURE: 78 MMHG

## 2023-07-06 DIAGNOSIS — F40.243 FEAR OF FLYING: ICD-10-CM

## 2023-07-06 DIAGNOSIS — E03.9 HYPOTHYROIDISM, UNSPECIFIED TYPE: ICD-10-CM

## 2023-07-06 DIAGNOSIS — I16.0 HYPERTENSIVE URGENCY: ICD-10-CM

## 2023-07-06 DIAGNOSIS — Z13.1 SCREENING FOR DIABETES MELLITUS: ICD-10-CM

## 2023-07-06 DIAGNOSIS — R94.6 ABNORMAL THYROID FUNCTION TEST: ICD-10-CM

## 2023-07-06 DIAGNOSIS — Z72.52 HIGH RISK HOMOSEXUAL BEHAVIOR: ICD-10-CM

## 2023-07-06 DIAGNOSIS — K29.50 CHRONIC GASTRITIS WITHOUT BLEEDING, UNSPECIFIED GASTRITIS TYPE: ICD-10-CM

## 2023-07-06 DIAGNOSIS — Z13.0 SCREENING FOR IRON DEFICIENCY ANEMIA: ICD-10-CM

## 2023-07-06 DIAGNOSIS — M54.12 CERVICAL RADICULOPATHY: Primary | ICD-10-CM

## 2023-07-06 DIAGNOSIS — E78.2 MIXED HYPERLIPIDEMIA: ICD-10-CM

## 2023-07-06 PROCEDURE — 99214 OFFICE O/P EST MOD 30 MIN: CPT | Performed by: FAMILY MEDICINE

## 2023-07-06 RX ORDER — OXYCODONE HYDROCHLORIDE 10 MG/1
10 TABLET ORAL EVERY 8 HOURS PRN
Qty: 60 TABLET | Refills: 0 | Status: SHIPPED | OUTPATIENT
Start: 2023-07-06

## 2023-07-06 NOTE — PROGRESS NOTES
Assessment/Plan:   1. Cervical radiculopathy  Stable today. Patient has been using his medication regularly. He denies any recent exacerbations of his cervical radiculopathy. Continue with his current treatment of oxycodone. PDMP reviewed, no abnormality seen today. [facility] drug screen completed today. - oxyCODONE (ROXICODONE) 10 MG TABS; Take 1 tablet (10 mg total) by mouth every 8 (eight) hours as needed for moderate pain Max Daily Amount: 30 mg  Dispense: 60 tablet; Refill: 0  - [facility] All Prescribed Meds and Special Instructions      2. Hypertensive urgency  Stable today. At this time, continue with routine home monitoring as well as current treatment with amlodipine as well as lisinopril. 3. Chronic gastritis without bleeding, unspecified gastritis type  Stable. At this time, continue with his current treatment of famotidine. 4. Hypothyroidism, unspecified type  Recheck thyroid function testing. Continue with current dose of levothyroxine.  - TSH, 3rd generation with Free T4 reflex; Future    5. Fear of flying  She denies any recent exacerbations. Continue with his diazepam as needed for symptom relief. 6. Mixed hyperlipidemia  Recheck lipid panel. Continue with a strict low-fat/low-cholesterol diet. Follow-up with patient in 6 months. - Lipid Panel with Direct LDL reflex; Future    7. Abnormal thyroid function test    8. 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 Subjective:       Chief Complaint   Patient presents with   • Follow-up     6 month follow up      Patient ID: Reagan Kenney is a 59 y.o. male presents today for a follow-up on his chronic disease. He has cervical radiculopathy, hypertension, chronic gastritis, hypothyroidism, fear flying, dyslipidemia. He has been taking his medications regularly.   He denies adverse reactions with his medications. HPI    Review of Systems   Constitutional: Negative for activity change, chills, fatigue and fever. HENT: Negative for congestion, ear pain, sinus pressure and sore throat. Eyes: Negative for redness, itching and visual disturbance. Respiratory: Negative for cough and shortness of breath. Cardiovascular: Negative for chest pain and palpitations. Gastrointestinal: Negative for abdominal pain, diarrhea and nausea. Endocrine: Negative for cold intolerance and heat intolerance. Genitourinary: Negative for dysuria, flank pain and frequency. Musculoskeletal: Negative for arthralgias, back pain, gait problem and myalgias. Skin: Negative for color change. Allergic/Immunologic: Negative for environmental allergies. Neurological: Negative for dizziness, numbness and headaches. Psychiatric/Behavioral: Negative for behavioral problems and sleep disturbance. The following portions of the patient's history were reviewed and updated as appropriate : past family history, past medical history, past social history and past surgical history. Current Outpatient Medications:   •  amLODIPine (NORVASC) 5 mg tablet, Take 1 tablet (5 mg total) by mouth daily, Disp: 90 tablet, Rfl: 1  •  diazepam (VALIUM) 5 mg tablet, Take 1 tablet (5 mg total) by mouth daily as needed for anxiety, Disp: 60 tablet, Rfl: 0  •  famotidine (PEPCID) 20 mg tablet, Take 1 tablet (20 mg total) by mouth 2 (two) times a day, Disp: 120 tablet, Rfl: 0  •  levothyroxine 137 mcg tablet, Take 1 tablet (137 mcg total) by mouth daily, Disp: 90 tablet, Rfl: 1  •  Lidocaine Viscous HCl (XYLOCAINE) 2 % mucosal solution, Swish and spit 15 mL 4 (four) times a day as needed for mouth pain or discomfort, Disp: 100 mL, Rfl: 0  •  lisinopril (ZESTRIL) 20 mg tablet, Take 1 tablet (20 mg total) by mouth daily, Disp: 90 tablet, Rfl: 1  •  naloxone (NARCAN) 4 mg/0.1 mL nasal spray, Administer 1 spray into a nostril.  If breathing does not return to normal or if breathing difficulty resumes after 2-3 minutes, give another dose in the other nostril using a new spray., Disp: 1 each, Rfl: 0  •  ondansetron (Zofran ODT) 4 mg disintegrating tablet, Take 1 tablet (4 mg total) by mouth every 6 (six) hours as needed for nausea or vomiting, Disp: 20 tablet, Rfl: 0  •  oxyCODONE (ROXICODONE) 10 MG TABS, Take 1 tablet (10 mg total) by mouth every 8 (eight) hours as needed for moderate pain Max Daily Amount: 30 mg, Disp: 60 tablet, Rfl: 0  •  omeprazole (PriLOSEC) 40 MG capsule, Take 1 capsule (40 mg total) by mouth 2 (two) times a day for 14 days (Patient not taking: Reported on 7/6/2023), Disp: 28 capsule, Rfl: 0         Objective:         Vitals:    07/06/23 1152   BP: 130/78   BP Location: Left arm   Patient Position: Sitting   Cuff Size: Large   Pulse: 73   Temp: 98.4 °F (36.9 °C)   SpO2: 98%   Weight: 100 kg (220 lb 6.4 oz)   Height: 5' 10" (1.778 m)     Physical Exam  Vitals reviewed. Constitutional:       Appearance: He is well-developed. HENT:      Head: Normocephalic and atraumatic. Nose: Nose normal.      Mouth/Throat:      Pharynx: No oropharyngeal exudate. Eyes:      General: No scleral icterus. Right eye: No discharge. Left eye: No discharge. Pupils: Pupils are equal, round, and reactive to light. Neck:      Trachea: No tracheal deviation. Cardiovascular:      Rate and Rhythm: Normal rate and regular rhythm. Pulses:           Dorsalis pedis pulses are 2+ on the right side and 2+ on the left side. Posterior tibial pulses are 2+ on the right side and 2+ on the left side. Heart sounds: Normal heart sounds. No murmur heard. No friction rub. No gallop. Pulmonary:      Effort: Pulmonary effort is normal. No respiratory distress. Breath sounds: Normal breath sounds. No wheezing or rales. Abdominal:      General: Bowel sounds are normal. There is no distension.       Palpations: Abdomen is soft.      Tenderness: There is no abdominal tenderness. There is no guarding or rebound. Musculoskeletal:         General: Normal range of motion. Cervical back: Normal range of motion and neck supple. Lymphadenopathy:      Head:      Right side of head: No submental or submandibular adenopathy. Left side of head: No submental or submandibular adenopathy. Cervical: No cervical adenopathy. Right cervical: No superficial, deep or posterior cervical adenopathy. Left cervical: No superficial, deep or posterior cervical adenopathy. Skin:     General: Skin is warm and dry. Findings: No erythema. Neurological:      Mental Status: He is alert and oriented to person, place, and time. Cranial Nerves: No cranial nerve deficit. Sensory: No sensory deficit. Psychiatric:         Mood and Affect: Mood is not anxious or depressed. Speech: Speech normal.         Behavior: Behavior normal.         Thought Content:  Thought content normal.         Judgment: Judgment normal.

## 2023-07-09 LAB
6MAM UR QL CFM: NEGATIVE NG/ML
7AMINOCLONAZEPAM UR QL CFM: NEGATIVE NG/ML
A-OH ALPRAZ UR QL CFM: NEGATIVE NG/ML
ACCEPTABLE CREAT UR QL: NORMAL MG/DL
ACCEPTIBLE SP GR UR QL: NORMAL
AMPHET UR QL CFM: NEGATIVE NG/ML
BUPRENORPHINE UR QL CFM: NEGATIVE NG/ML
BUTALBITAL UR QL CFM: NEGATIVE NG/ML
BZE UR QL CFM: NEGATIVE NG/ML
CODEINE UR QL CFM: NEGATIVE NG/ML
EDDP UR QL CFM: NEGATIVE NG/ML
ETHYL GLUCURONIDE UR QL CFM: NEGATIVE NG/ML
ETHYL SULFATE UR QL SCN: NEGATIVE NG/ML
EUTYLONE UR QL: NEGATIVE NG/ML
FENTANYL UR QL CFM: NEGATIVE NG/ML
GLIADIN IGG SER IA-ACNC: NEGATIVE NG/ML
HYDROCODONE UR QL CFM: NEGATIVE NG/ML
HYDROMORPHONE UR QL CFM: NEGATIVE NG/ML
LORAZEPAM UR QL CFM: NEGATIVE NG/ML
ME-PHENIDATE UR QL CFM: NEGATIVE NG/ML
MEPERIDINE UR QL CFM: NEGATIVE NG/ML
METHADONE UR QL CFM: NEGATIVE NG/ML
METHAMPHET UR QL CFM: NEGATIVE NG/ML
MORPHINE UR QL CFM: NEGATIVE NG/ML
NALTREXONE UR QL CFM: NEGATIVE NG/ML
NITRITE UR QL: NORMAL UG/ML
NORBUPRENORPHINE UR QL CFM: NEGATIVE NG/ML
NORDIAZEPAM UR QL CFM: NEGATIVE NG/ML
NORFENTANYL UR QL CFM: NEGATIVE NG/ML
NORHYDROCODONE UR QL CFM: NEGATIVE NG/ML
NORMEPERIDINE UR QL CFM: NEGATIVE NG/ML
NOROXYCODONE UR QL CFM: ABNORMAL NG/ML
OXAZEPAM UR QL CFM: NORMAL NG/ML
OXYCODONE UR QL CFM: ABNORMAL NG/ML
OXYMORPHONE UR QL CFM: ABNORMAL NG/ML
PARA-FLUOROFENTANYL QUANTIFICATION: NORMAL NG/ML
PHENOBARB UR QL CFM: NEGATIVE NG/ML
RESULT ALL_PRESCRIBED MEDS AND SPECIAL INSTRUCTIONS: NORMAL
SECOBARBITAL UR QL CFM: NEGATIVE NG/ML
SL AMB 4-ANPP QUANTIFICATION: NORMAL NG/ML
SL AMB 5F-ADB-M7 METABOLITE QUANTIFICATION: NEGATIVE NG/ML
SL AMB 7-OH-MITRAGYNINE (KRATOM ALKALOID) QUANTIFICATION: NEGATIVE NG/ML
SL AMB AB-FUBINACA-M3 METABOLITE QUANTIFICATION: NEGATIVE NG/ML
SL AMB ACETYL FENTANYL QUANTIFICATION: NORMAL NG/ML
SL AMB ACETYL NORFENTANYL QUANTIFICATION: NORMAL NG/ML
SL AMB ACRYL FENTANYL QUANTIFICATION: NORMAL NG/ML
SL AMB CARFENTANIL QUANTIFICATION: NORMAL NG/ML
SL AMB CTHC (MARIJUANA METABOLITE) QUANTIFICATION: NEGATIVE NG/ML
SL AMB DEXTRORPHAN (DEXTROMETHORPHAN METABOLITE) QUANT: NEGATIVE NG/ML
SL AMB GABAPENTIN QUANTIFICATION: NEGATIVE
SL AMB JWH018 METABOLITE QUANTIFICATION: NEGATIVE NG/ML
SL AMB JWH073 METABOLITE QUANTIFICATION: NEGATIVE NG/ML
SL AMB MDMB-FUBINACA-M1 METABOLITE QUANTIFICATION: NEGATIVE NG/ML
SL AMB METHYLONE QUANTIFICATION: NEGATIVE NG/ML
SL AMB N-DESMETHYL-TRAMADOL QUANTIFICATION: NEGATIVE NG/ML
SL AMB PHENTERMINE QUANTIFICATION: NEGATIVE NG/ML
SL AMB PREGABALIN QUANTIFICATION: NEGATIVE
SL AMB RCS4 METABOLITE QUANTIFICATION: NEGATIVE NG/ML
SL AMB RITALINIC ACID QUANTIFICATION: NEGATIVE NG/ML
SMOOTH MUSCLE AB TITR SER IF: NEGATIVE NG/ML
SPECIMEN DRAWN SERPL: NEGATIVE NG/ML
SPECIMEN PH ACCEPTABLE UR: NORMAL
TAPENTADOL UR QL CFM: NEGATIVE NG/ML
TEMAZEPAM UR QL CFM: NEGATIVE NG/ML
TRAMADOL UR QL CFM: NEGATIVE NG/ML
URATE/CREAT 24H UR: NEGATIVE NG/ML

## 2023-07-17 DIAGNOSIS — K29.70 GASTRITIS: ICD-10-CM

## 2023-07-17 DIAGNOSIS — R11.2 NAUSEA AND VOMITING: ICD-10-CM

## 2023-07-17 RX ORDER — FAMOTIDINE 20 MG/1
20 TABLET, FILM COATED ORAL 2 TIMES DAILY
Qty: 120 TABLET | Refills: 0 | Status: SHIPPED | OUTPATIENT
Start: 2023-07-17

## 2023-08-15 DIAGNOSIS — M54.12 CERVICAL RADICULOPATHY: ICD-10-CM

## 2023-08-15 DIAGNOSIS — F40.243 FEAR OF FLYING: ICD-10-CM

## 2023-08-16 RX ORDER — OXYCODONE HYDROCHLORIDE 10 MG/1
10 TABLET ORAL EVERY 8 HOURS PRN
Qty: 60 TABLET | Refills: 0 | Status: SHIPPED | OUTPATIENT
Start: 2023-08-16

## 2023-08-16 RX ORDER — DIAZEPAM 5 MG/1
5 TABLET ORAL DAILY PRN
Qty: 60 TABLET | Refills: 0 | Status: SHIPPED | OUTPATIENT
Start: 2023-08-16 | End: 2023-09-15

## 2023-08-21 ENCOUNTER — TELEPHONE (OUTPATIENT)
Dept: PALLIATIVE MEDICINE | Facility: CLINIC | Age: 58
End: 2023-08-21

## 2023-09-11 DIAGNOSIS — K29.70 GASTRITIS: ICD-10-CM

## 2023-09-11 DIAGNOSIS — R11.2 NAUSEA AND VOMITING: ICD-10-CM

## 2023-09-11 RX ORDER — FAMOTIDINE 20 MG/1
20 TABLET, FILM COATED ORAL 2 TIMES DAILY
Qty: 120 TABLET | Refills: 1 | Status: SHIPPED | OUTPATIENT
Start: 2023-09-11

## 2023-09-11 NOTE — TELEPHONE ENCOUNTER
Received fax thru [employer] for med refill    Famotidine 20 mg tab  Take 1 tab by mouth twice a day  #120  [facility] [place]

## 2023-10-01 DIAGNOSIS — M54.12 CERVICAL RADICULOPATHY: ICD-10-CM

## 2023-10-01 DIAGNOSIS — F40.243 FEAR OF FLYING: ICD-10-CM

## 2023-10-02 RX ORDER — OXYCODONE HYDROCHLORIDE 10 MG/1
10 TABLET ORAL EVERY 8 HOURS PRN
Qty: 60 TABLET | Refills: 0 | Status: SHIPPED | OUTPATIENT
Start: 2023-10-02

## 2023-10-02 RX ORDER — DIAZEPAM 5 MG/1
5 TABLET ORAL DAILY PRN
Qty: 60 TABLET | Refills: 0 | Status: SHIPPED | OUTPATIENT
Start: 2023-10-02 | End: 2023-11-01

## 2023-11-06 DIAGNOSIS — M54.12 CERVICAL RADICULOPATHY: ICD-10-CM

## 2023-11-07 RX ORDER — OXYCODONE HYDROCHLORIDE 10 MG/1
10 TABLET ORAL EVERY 8 HOURS PRN
Qty: 60 TABLET | Refills: 0 | Status: SHIPPED | OUTPATIENT
Start: 2023-11-07

## 2023-11-13 DIAGNOSIS — E03.9 HYPOTHYROIDISM, UNSPECIFIED TYPE: ICD-10-CM

## 2023-11-13 RX ORDER — LEVOTHYROXINE SODIUM 137 UG/1
137 TABLET ORAL DAILY
Qty: 90 TABLET | Refills: 0 | Status: SHIPPED | OUTPATIENT
Start: 2023-11-13

## 2023-11-28 DIAGNOSIS — I16.0 HYPERTENSIVE URGENCY: ICD-10-CM

## 2023-11-29 RX ORDER — LISINOPRIL 20 MG/1
20 TABLET ORAL DAILY
Qty: 90 TABLET | Refills: 0 | Status: SHIPPED | OUTPATIENT
Start: 2023-11-29

## 2023-12-05 DIAGNOSIS — M54.12 CERVICAL RADICULOPATHY: ICD-10-CM

## 2023-12-06 DIAGNOSIS — F40.243 FEAR OF FLYING: ICD-10-CM

## 2023-12-06 RX ORDER — DIAZEPAM 5 MG/1
5 TABLET ORAL DAILY PRN
Qty: 60 TABLET | Refills: 0 | Status: SHIPPED | OUTPATIENT
Start: 2023-12-06

## 2023-12-06 RX ORDER — OXYCODONE HYDROCHLORIDE 10 MG/1
10 TABLET ORAL EVERY 8 HOURS PRN
Qty: 60 TABLET | Refills: 0 | Status: SHIPPED | OUTPATIENT
Start: 2023-12-06

## 2023-12-06 NOTE — TELEPHONE ENCOUNTER
----- Message from Letitia Funes sent at 12/5/2023  8:49 PM EST -----  Regarding: Diazapam  Contact: [phone]  Dr. Carol Juarez,  I tried to refill my Diazapam script and its not letting me make the request.  I usually get a 60 pill refill but perhaps my insurance will not fill that and the last time I refilled was the beginning of October and was for 30 pills not 60. Im trying to request a refill now without any success. Any reason as to why? Thank you for your assistance.     Regards,  Letitia Funes

## 2024-01-04 ENCOUNTER — APPOINTMENT (OUTPATIENT)
Dept: LAB | Facility: CLINIC | Age: 59
End: 2024-01-04
Payer: COMMERCIAL

## 2024-01-04 DIAGNOSIS — E78.2 MIXED HYPERLIPIDEMIA: ICD-10-CM

## 2024-01-04 DIAGNOSIS — Z72.52 HIGH RISK HOMOSEXUAL BEHAVIOR: ICD-10-CM

## 2024-01-04 DIAGNOSIS — E03.9 HYPOTHYROIDISM, UNSPECIFIED TYPE: ICD-10-CM

## 2024-01-04 DIAGNOSIS — Z13.0 SCREENING FOR IRON DEFICIENCY ANEMIA: ICD-10-CM

## 2024-01-04 DIAGNOSIS — Z13.1 SCREENING FOR DIABETES MELLITUS: ICD-10-CM

## 2024-01-04 LAB
ALBUMIN SERPL BCP-MCNC: 4.8 G/DL (ref 3.5–5)
ALP SERPL-CCNC: 74 U/L (ref 34–104)
ALT SERPL W P-5'-P-CCNC: 70 U/L (ref 7–52)
ANION GAP SERPL CALCULATED.3IONS-SCNC: 16 MMOL/L
AST SERPL W P-5'-P-CCNC: 54 U/L (ref 13–39)
BILIRUB SERPL-MCNC: 0.5 MG/DL (ref 0.2–1)
BUN SERPL-MCNC: 12 MG/DL (ref 5–25)
CALCIUM SERPL-MCNC: 10.2 MG/DL (ref 8.4–10.2)
CHLORIDE SERPL-SCNC: 100 MMOL/L (ref 96–108)
CHOLEST SERPL-MCNC: 296 MG/DL
CO2 SERPL-SCNC: 25 MMOL/L (ref 21–32)
CREAT SERPL-MCNC: 0.81 MG/DL (ref 0.6–1.3)
ERYTHROCYTE [DISTWIDTH] IN BLOOD BY AUTOMATED COUNT: 11.8 % (ref 11.6–15.1)
EST. AVERAGE GLUCOSE BLD GHB EST-MCNC: 111 MG/DL
GFR SERPL CREATININE-BSD FRML MDRD: 96 ML/MIN/1.73SQ M
GLUCOSE P FAST SERPL-MCNC: 70 MG/DL (ref 65–99)
HBA1C MFR BLD: 5.5 %
HCT VFR BLD AUTO: 44.7 % (ref 36.5–49.3)
HDLC SERPL-MCNC: 50 MG/DL
HGB BLD-MCNC: 14.9 G/DL (ref 12–17)
HIV 1+2 AB+HIV1 P24 AG SERPL QL IA: NORMAL
HIV 2 AB SERPL QL IA: NORMAL
HIV1 AB SERPL QL IA: NORMAL
HIV1 P24 AG SERPL QL IA: NORMAL
LDLC SERPL CALC-MCNC: 201 MG/DL (ref 0–100)
MCH RBC QN AUTO: 34.4 PG (ref 26.8–34.3)
MCHC RBC AUTO-ENTMCNC: 33.3 G/DL (ref 31.4–37.4)
MCV RBC AUTO: 103 FL (ref 82–98)
PLATELET # BLD AUTO: 259 THOUSANDS/UL (ref 149–390)
PMV BLD AUTO: 11.2 FL (ref 8.9–12.7)
POTASSIUM SERPL-SCNC: 4 MMOL/L (ref 3.5–5.3)
PROT SERPL-MCNC: 8 G/DL (ref 6.4–8.4)
RBC # BLD AUTO: 4.33 MILLION/UL (ref 3.88–5.62)
SODIUM SERPL-SCNC: 141 MMOL/L (ref 135–147)
T4 FREE SERPL-MCNC: 0.98 NG/DL (ref 0.61–1.12)
TRIGL SERPL-MCNC: 224 MG/DL
TSH SERPL DL<=0.05 MIU/L-ACNC: 6.23 UIU/ML (ref 0.45–4.5)
WBC # BLD AUTO: 6.07 THOUSAND/UL (ref 4.31–10.16)

## 2024-01-04 PROCEDURE — 84439 ASSAY OF FREE THYROXINE: CPT

## 2024-01-04 PROCEDURE — 80053 COMPREHEN METABOLIC PANEL: CPT

## 2024-01-04 PROCEDURE — 80061 LIPID PANEL: CPT

## 2024-01-04 PROCEDURE — 36415 COLL VENOUS BLD VENIPUNCTURE: CPT

## 2024-01-04 PROCEDURE — 87389 HIV-1 AG W/HIV-1&-2 AB AG IA: CPT

## 2024-01-04 PROCEDURE — 84443 ASSAY THYROID STIM HORMONE: CPT

## 2024-01-04 PROCEDURE — 85027 COMPLETE CBC AUTOMATED: CPT

## 2024-01-04 PROCEDURE — 83036 HEMOGLOBIN GLYCOSYLATED A1C: CPT

## 2024-01-10 ENCOUNTER — OFFICE VISIT (OUTPATIENT)
Dept: FAMILY MEDICINE CLINIC | Facility: CLINIC | Age: 59
End: 2024-01-10
Payer: COMMERCIAL

## 2024-01-10 VITALS
BODY MASS INDEX: 32.28 KG/M2 | WEIGHT: 225 LBS | SYSTOLIC BLOOD PRESSURE: 122 MMHG | RESPIRATION RATE: 18 BRPM | TEMPERATURE: 97.2 F | HEART RATE: 88 BPM | DIASTOLIC BLOOD PRESSURE: 78 MMHG | OXYGEN SATURATION: 95 %

## 2024-01-10 DIAGNOSIS — E03.9 HYPOTHYROIDISM, UNSPECIFIED TYPE: ICD-10-CM

## 2024-01-10 DIAGNOSIS — M54.12 CERVICAL RADICULOPATHY: ICD-10-CM

## 2024-01-10 DIAGNOSIS — I16.0 HYPERTENSIVE URGENCY: Primary | ICD-10-CM

## 2024-01-10 DIAGNOSIS — E78.2 MIXED HYPERLIPIDEMIA: ICD-10-CM

## 2024-01-10 DIAGNOSIS — K29.50 CHRONIC GASTRITIS WITHOUT BLEEDING, UNSPECIFIED GASTRITIS TYPE: ICD-10-CM

## 2024-01-10 PROCEDURE — 99214 OFFICE O/P EST MOD 30 MIN: CPT | Performed by: FAMILY MEDICINE

## 2024-01-10 RX ORDER — OXYCODONE HYDROCHLORIDE 10 MG/1
10 TABLET ORAL EVERY 8 HOURS PRN
Qty: 60 TABLET | Refills: 0 | Status: SHIPPED | OUTPATIENT
Start: 2024-01-10

## 2024-01-10 NOTE — PROGRESS NOTES
Assessment/Plan:   1. Hypertensive urgency  Blood pressure appears very well-controlled today.  , Continue with routine home monitoring as well as his current treatment with amlodipine as well as his lisinopril.    2. Cervical radiculopathy  Stable.  At this time, continue with his current treatment of oxycodone.  PDMP reviewed, no abnormality seen today.  - oxyCODONE (ROXICODONE) 10 MG TABS; Take 1 tablet (10 mg total) by mouth every 8 (eight) hours as needed for moderate pain Max Daily Amount: 30 mg  Dispense: 60 tablet; Refill: 0    3. Hypothyroidism, unspecified type  Borderline elevated.  At this time, we will continue with his current treatment levothyroxine.  Will recheck TSH in 2 months.  - TSH, 3rd generation with Free T4 reflex; Future    4. Chronic gastritis without bleeding, unspecified gastritis type  Stable.  Continue with his current treatment famotidine.    5.  Hyperlipidemia  Lipid panel reviewed with patient.  His cholesterol levels appear significantly elevated.  At this time, he may highly benefit from completing a coronary calcium score.  He does not wish to pursue any treatment with statin medications continue with a strict diet and exercise plan.  Will recheck his blood work in 6 months.         Diagnoses and all orders for this visit:    Hypertensive urgency    Cervical radiculopathy          Subjective:       Chief Complaint   Patient presents with    Follow-up      Patient ID: Sonia Fierro is a 58 y.o. male presents today for a follow-up on his chronic conditions.  He has hypertension, cervical radiculopathy, hypothyroidism as well as chronic gastritis.  He has been taking his medications regularly.  He denies adverse reactions with his medications.    HPI    Review of Systems   Constitutional:  Negative for activity change, chills, fatigue and fever.   HENT:  Negative for congestion, ear pain, sinus pressure and sore throat.    Eyes:  Negative for redness, itching and visual disturbance.    Respiratory:  Negative for cough and shortness of breath.    Cardiovascular:  Negative for chest pain and palpitations.   Gastrointestinal:  Negative for abdominal pain, diarrhea and nausea.   Endocrine: Negative for cold intolerance and heat intolerance.   Genitourinary:  Negative for dysuria, flank pain and frequency.   Musculoskeletal:  Negative for arthralgias, back pain, gait problem and myalgias.   Skin:  Negative for color change.   Allergic/Immunologic: Negative for environmental allergies.   Neurological:  Negative for dizziness, numbness and headaches.   Psychiatric/Behavioral:  Negative for behavioral problems and sleep disturbance.        The following portions of the patient's history were reviewed and updated as appropriate : past family history, past medical history, past social history and past surgical history.    Current Outpatient Medications:     amLODIPine (NORVASC) 5 mg tablet, Take 1 tablet (5 mg total) by mouth daily, Disp: 90 tablet, Rfl: 1    diazepam (VALIUM) 5 mg tablet, Take 1 tablet (5 mg total) by mouth daily as needed for anxiety, Disp: 60 tablet, Rfl: 0    famotidine (PEPCID) 20 mg tablet, Take 1 tablet (20 mg total) by mouth 2 (two) times a day, Disp: 120 tablet, Rfl: 1    levothyroxine 137 mcg tablet, Take 1 tablet by mouth once daily, Disp: 90 tablet, Rfl: 0    lisinopril (ZESTRIL) 20 mg tablet, Take 1 tablet (20 mg total) by mouth daily, Disp: 90 tablet, Rfl: 0    naloxone (NARCAN) 4 mg/0.1 mL nasal spray, Administer 1 spray into a nostril. If breathing does not return to normal or if breathing difficulty resumes after 2-3 minutes, give another dose in the other nostril using a new spray., Disp: 1 each, Rfl: 0    ondansetron (Zofran ODT) 4 mg disintegrating tablet, Take 1 tablet (4 mg total) by mouth every 6 (six) hours as needed for nausea or vomiting, Disp: 20 tablet, Rfl: 0    oxyCODONE (ROXICODONE) 10 MG TABS, Take 1 tablet (10 mg total) by mouth every 8 (eight) hours as  needed for moderate pain Max Daily Amount: 30 mg, Disp: 60 tablet, Rfl: 0    Lidocaine Viscous HCl (XYLOCAINE) 2 % mucosal solution, Swish and spit 15 mL 4 (four) times a day as needed for mouth pain or discomfort, Disp: 100 mL, Rfl: 0         Objective:         Vitals:    01/10/24 1141   BP: 122/78   BP Location: Left arm   Patient Position: Sitting   Cuff Size: Large   Pulse: 88   Resp: 18   Temp: (!) 97.2 °F (36.2 °C)   TempSrc: Temporal   SpO2: 95%   Weight: 102 kg (225 lb)     Physical Exam  Vitals reviewed.   Constitutional:       Appearance: He is well-developed.   HENT:      Head: Normocephalic and atraumatic.      Nose: Nose normal.      Mouth/Throat:      Pharynx: No oropharyngeal exudate.   Eyes:      General: No scleral icterus.        Right eye: No discharge.         Left eye: No discharge.      Pupils: Pupils are equal, round, and reactive to light.   Neck:      Trachea: No tracheal deviation.   Cardiovascular:      Rate and Rhythm: Normal rate and regular rhythm.      Pulses:           Dorsalis pedis pulses are 2+ on the right side and 2+ on the left side.        Posterior tibial pulses are 2+ on the right side and 2+ on the left side.      Heart sounds: Normal heart sounds. No murmur heard.     No friction rub. No gallop.   Pulmonary:      Effort: Pulmonary effort is normal. No respiratory distress.      Breath sounds: Normal breath sounds. No wheezing or rales.   Abdominal:      General: Bowel sounds are normal. There is no distension.      Palpations: Abdomen is soft.      Tenderness: There is no abdominal tenderness. There is no guarding or rebound.   Musculoskeletal:         General: Normal range of motion.      Cervical back: Normal range of motion and neck supple.   Lymphadenopathy:      Head:      Right side of head: No submental or submandibular adenopathy.      Left side of head: No submental or submandibular adenopathy.      Cervical: No cervical adenopathy.      Right cervical: No  superficial, deep or posterior cervical adenopathy.     Left cervical: No superficial, deep or posterior cervical adenopathy.   Skin:     General: Skin is warm and dry.      Findings: No erythema.   Neurological:      Mental Status: He is alert and oriented to person, place, and time.      Cranial Nerves: No cranial nerve deficit.      Sensory: No sensory deficit.   Psychiatric:         Mood and Affect: Mood is not anxious or depressed.         Speech: Speech normal.         Behavior: Behavior normal.         Thought Content: Thought content normal.         Judgment: Judgment normal.

## 2024-01-16 DIAGNOSIS — K29.70 GASTRITIS: ICD-10-CM

## 2024-01-16 DIAGNOSIS — R11.2 NAUSEA AND VOMITING: ICD-10-CM

## 2024-01-16 DIAGNOSIS — I16.0 HYPERTENSIVE URGENCY: ICD-10-CM

## 2024-01-16 RX ORDER — AMLODIPINE BESYLATE 5 MG/1
5 TABLET ORAL DAILY
Qty: 90 TABLET | Refills: 1 | Status: SHIPPED | OUTPATIENT
Start: 2024-01-16

## 2024-01-16 RX ORDER — FAMOTIDINE 20 MG/1
20 TABLET, FILM COATED ORAL 2 TIMES DAILY
Qty: 180 TABLET | Refills: 1 | Status: SHIPPED | OUTPATIENT
Start: 2024-01-16

## 2024-02-03 DIAGNOSIS — E03.9 HYPOTHYROIDISM, UNSPECIFIED TYPE: ICD-10-CM

## 2024-02-03 DIAGNOSIS — K29.00 ACUTE GASTRITIS WITHOUT HEMORRHAGE, UNSPECIFIED GASTRITIS TYPE: ICD-10-CM

## 2024-02-03 DIAGNOSIS — M54.12 CERVICAL RADICULOPATHY: ICD-10-CM

## 2024-02-04 RX ORDER — LEVOTHYROXINE SODIUM 137 UG/1
137 TABLET ORAL DAILY
Qty: 90 TABLET | Refills: 0 | Status: SHIPPED | OUTPATIENT
Start: 2024-02-04

## 2024-02-05 RX ORDER — LIDOCAINE HYDROCHLORIDE 20 MG/ML
15 SOLUTION OROPHARYNGEAL 4 TIMES DAILY PRN
Qty: 100 ML | Refills: 0 | Status: SHIPPED | OUTPATIENT
Start: 2024-02-05

## 2024-02-05 RX ORDER — OXYCODONE HYDROCHLORIDE 10 MG/1
10 TABLET ORAL EVERY 8 HOURS PRN
Qty: 60 TABLET | Refills: 0 | Status: SHIPPED | OUTPATIENT
Start: 2024-02-05

## 2024-02-07 ENCOUNTER — TELEPHONE (OUTPATIENT)
Dept: FAMILY MEDICINE CLINIC | Facility: CLINIC | Age: 59
End: 2024-02-07

## 2024-02-07 NOTE — TELEPHONE ENCOUNTER
Received fax thru [facility] from pharmacy that Viscous lidocaine continues to be unavailable due to  backorder with no estimated release date. Please send new rx for alternative [facility]

## 2024-02-15 ENCOUNTER — TELEPHONE (OUTPATIENT)
Age: 59
End: 2024-02-15

## 2024-02-20 DIAGNOSIS — I16.0 HYPERTENSIVE URGENCY: ICD-10-CM

## 2024-02-20 RX ORDER — LISINOPRIL 20 MG/1
20 TABLET ORAL DAILY
Qty: 90 TABLET | Refills: 1 | Status: SHIPPED | OUTPATIENT
Start: 2024-02-20

## 2024-02-21 PROBLEM — Z13.0 SCREENING FOR IRON DEFICIENCY ANEMIA: Status: RESOLVED | Noted: 2018-03-29 | Resolved: 2024-02-21

## 2024-02-21 PROBLEM — Z13.1 SCREENING FOR DIABETES MELLITUS: Status: RESOLVED | Noted: 2018-03-29 | Resolved: 2024-02-21

## 2024-02-26 ENCOUNTER — APPOINTMENT (OUTPATIENT)
Dept: LAB | Facility: CLINIC | Age: 59
End: 2024-02-26
Payer: COMMERCIAL

## 2024-02-26 DIAGNOSIS — E03.9 HYPOTHYROIDISM, UNSPECIFIED TYPE: ICD-10-CM

## 2024-02-26 LAB
T4 FREE SERPL-MCNC: 1.03 NG/DL (ref 0.61–1.12)
TSH SERPL DL<=0.05 MIU/L-ACNC: 17.39 UIU/ML (ref 0.45–4.5)

## 2024-02-26 PROCEDURE — 36415 COLL VENOUS BLD VENIPUNCTURE: CPT

## 2024-02-26 PROCEDURE — 84439 ASSAY OF FREE THYROXINE: CPT

## 2024-02-26 PROCEDURE — 84443 ASSAY THYROID STIM HORMONE: CPT

## 2024-02-26 RX ORDER — LEVOTHYROXINE SODIUM 0.15 MG/1
150 TABLET ORAL DAILY
Qty: 30 TABLET | Refills: 2 | Status: SHIPPED | OUTPATIENT
Start: 2024-02-26 | End: 2024-03-27

## 2024-02-29 DIAGNOSIS — K29.00 ACUTE GASTRITIS WITHOUT HEMORRHAGE, UNSPECIFIED GASTRITIS TYPE: ICD-10-CM

## 2024-02-29 RX ORDER — LIDOCAINE HYDROCHLORIDE 20 MG/ML
15 SOLUTION OROPHARYNGEAL 4 TIMES DAILY PRN
Qty: 100 ML | Refills: 0 | Status: SHIPPED | OUTPATIENT
Start: 2024-02-29

## 2024-03-04 DIAGNOSIS — M54.12 CERVICAL RADICULOPATHY: ICD-10-CM

## 2024-03-04 DIAGNOSIS — F40.243 FEAR OF FLYING: ICD-10-CM

## 2024-03-04 RX ORDER — DIAZEPAM 5 MG/1
5 TABLET ORAL DAILY PRN
Qty: 60 TABLET | Refills: 0 | Status: SHIPPED | OUTPATIENT
Start: 2024-03-04

## 2024-03-04 RX ORDER — OXYCODONE HYDROCHLORIDE 10 MG/1
10 TABLET ORAL EVERY 8 HOURS PRN
Qty: 60 TABLET | Refills: 0 | Status: SHIPPED | OUTPATIENT
Start: 2024-03-04

## 2024-04-01 DIAGNOSIS — M54.12 CERVICAL RADICULOPATHY: ICD-10-CM

## 2024-04-02 RX ORDER — OXYCODONE HYDROCHLORIDE 10 MG/1
10 TABLET ORAL EVERY 8 HOURS PRN
Qty: 60 TABLET | Refills: 0 | Status: SHIPPED | OUTPATIENT
Start: 2024-04-02

## 2024-05-02 DIAGNOSIS — M54.12 CERVICAL RADICULOPATHY: ICD-10-CM

## 2024-05-03 RX ORDER — OXYCODONE HYDROCHLORIDE 10 MG/1
10 TABLET ORAL EVERY 8 HOURS PRN
Qty: 60 TABLET | Refills: 0 | Status: SHIPPED | OUTPATIENT
Start: 2024-05-03

## 2024-05-15 ENCOUNTER — APPOINTMENT (OUTPATIENT)
Dept: LAB | Facility: CLINIC | Age: 59
End: 2024-05-15
Payer: COMMERCIAL

## 2024-05-15 DIAGNOSIS — E03.9 HYPOTHYROIDISM, UNSPECIFIED TYPE: ICD-10-CM

## 2024-05-15 LAB — TSH SERPL DL<=0.05 MIU/L-ACNC: 1.74 UIU/ML (ref 0.45–4.5)

## 2024-05-15 PROCEDURE — 84443 ASSAY THYROID STIM HORMONE: CPT

## 2024-05-15 PROCEDURE — 36415 COLL VENOUS BLD VENIPUNCTURE: CPT

## 2024-05-15 RX ORDER — LEVOTHYROXINE SODIUM 0.15 MG/1
150 TABLET ORAL DAILY
Qty: 30 TABLET | Refills: 0 | Status: SHIPPED | OUTPATIENT
Start: 2024-05-15 | End: 2024-06-14

## 2024-06-05 DIAGNOSIS — M54.12 CERVICAL RADICULOPATHY: ICD-10-CM

## 2024-06-05 RX ORDER — OXYCODONE HYDROCHLORIDE 10 MG/1
10 TABLET ORAL EVERY 8 HOURS PRN
Qty: 60 TABLET | Refills: 0 | Status: SHIPPED | OUTPATIENT
Start: 2024-06-05

## 2024-06-12 DIAGNOSIS — I16.0 HYPERTENSIVE URGENCY: ICD-10-CM

## 2024-06-12 DIAGNOSIS — E03.9 HYPOTHYROIDISM, UNSPECIFIED TYPE: ICD-10-CM

## 2024-06-12 RX ORDER — LEVOTHYROXINE SODIUM 0.15 MG/1
150 TABLET ORAL DAILY
Qty: 180 TABLET | Refills: 0 | Status: SHIPPED | OUTPATIENT
Start: 2024-06-12 | End: 2024-12-09

## 2024-06-12 RX ORDER — LEVOTHYROXINE SODIUM 0.15 MG/1
150 TABLET ORAL DAILY
Qty: 30 TABLET | Refills: 5 | Status: SHIPPED | OUTPATIENT
Start: 2024-06-12 | End: 2024-06-12 | Stop reason: SDUPTHER

## 2024-06-12 NOTE — TELEPHONE ENCOUNTER
Pt is requesting script be updated to a 90 day supply.  I told him I would send the request to the

## 2024-07-02 DIAGNOSIS — M54.12 CERVICAL RADICULOPATHY: ICD-10-CM

## 2024-07-02 RX ORDER — OXYCODONE HYDROCHLORIDE 10 MG/1
10 TABLET ORAL EVERY 8 HOURS PRN
Qty: 60 TABLET | Refills: 0 | Status: SHIPPED | OUTPATIENT
Start: 2024-07-02

## 2024-07-09 DIAGNOSIS — I16.0 HYPERTENSIVE URGENCY: ICD-10-CM

## 2024-07-10 RX ORDER — AMLODIPINE BESYLATE 5 MG/1
5 TABLET ORAL DAILY
Qty: 100 TABLET | Refills: 1 | Status: SHIPPED | OUTPATIENT
Start: 2024-07-10 | End: 2024-07-11 | Stop reason: SDUPTHER

## 2024-07-11 ENCOUNTER — OFFICE VISIT (OUTPATIENT)
Dept: FAMILY MEDICINE CLINIC | Facility: CLINIC | Age: 59
End: 2024-07-11
Payer: COMMERCIAL

## 2024-07-11 VITALS
WEIGHT: 228.6 LBS | HEIGHT: 70 IN | DIASTOLIC BLOOD PRESSURE: 84 MMHG | SYSTOLIC BLOOD PRESSURE: 134 MMHG | BODY MASS INDEX: 32.73 KG/M2 | HEART RATE: 100 BPM | TEMPERATURE: 98.5 F

## 2024-07-11 DIAGNOSIS — K29.50 CHRONIC GASTRITIS WITHOUT BLEEDING, UNSPECIFIED GASTRITIS TYPE: ICD-10-CM

## 2024-07-11 DIAGNOSIS — M54.12 CERVICAL RADICULOPATHY: ICD-10-CM

## 2024-07-11 DIAGNOSIS — E78.2 MIXED HYPERLIPIDEMIA: ICD-10-CM

## 2024-07-11 DIAGNOSIS — I16.0 HYPERTENSIVE URGENCY: Primary | ICD-10-CM

## 2024-07-11 DIAGNOSIS — R25.2 MUSCLE CRAMPING: ICD-10-CM

## 2024-07-11 DIAGNOSIS — H53.9 VISION CHANGES: ICD-10-CM

## 2024-07-11 DIAGNOSIS — E03.9 HYPOTHYROIDISM, UNSPECIFIED TYPE: ICD-10-CM

## 2024-07-11 DIAGNOSIS — F40.243 FEAR OF FLYING: ICD-10-CM

## 2024-07-11 DIAGNOSIS — Z13.0 SCREENING FOR IRON DEFICIENCY ANEMIA: ICD-10-CM

## 2024-07-11 DIAGNOSIS — Z13.1 SCREENING FOR DIABETES MELLITUS: ICD-10-CM

## 2024-07-11 PROCEDURE — 99214 OFFICE O/P EST MOD 30 MIN: CPT | Performed by: FAMILY MEDICINE

## 2024-07-11 PROCEDURE — 99396 PREV VISIT EST AGE 40-64: CPT | Performed by: FAMILY MEDICINE

## 2024-07-11 RX ORDER — AMLODIPINE BESYLATE 5 MG/1
5 TABLET ORAL DAILY
Qty: 100 TABLET | Refills: 1 | Status: SHIPPED | OUTPATIENT
Start: 2024-07-11

## 2024-07-11 RX ORDER — DIAZEPAM 5 MG/1
5 TABLET ORAL DAILY PRN
Qty: 60 TABLET | Refills: 0 | Status: SHIPPED | OUTPATIENT
Start: 2024-07-11

## 2024-07-11 NOTE — PROGRESS NOTES
Adult Annual Physical  Name: Sonia Fierro      : 1965      MRN: [id number]  Encounter Provider: Mark Maxwell DO  Encounter Date: 2024   Encounter department: [facility]    Assessment & Plan   1. Hypertensive urgency  -     amLODIPine (NORVASC) 5 mg tablet; Take 1 tablet (5 mg total) by mouth daily  2. Cervical radiculopathy  -     naloxone (NARCAN) 4 mg/0.1 mL nasal spray; Administer 1 spray into a nostril. If no response after 2-3 minutes, give another dose in the other nostril using a new spray.  3. Hypothyroidism, unspecified type  -     TSH, 3rd generation with Free T4 reflex; Future  4. Chronic gastritis without bleeding, unspecified gastritis type  5. Mixed hyperlipidemia  -     Comprehensive metabolic panel; Future  -     Lipid Panel with Direct LDL reflex; Future  6. Screening for diabetes mellitus  -     Hemoglobin A1C; Future  7. Screening for iron deficiency anemia  -     CBC; Future  8. Fear of flying  -     diazepam (VALIUM) 5 mg tablet; Take 1 tablet (5 mg total) by mouth daily as needed for anxiety  9. Vision changes  -     Ambulatory Referral to Optometry; Future  10. Muscle cramping  -     Magnesium; Future    Immunizations and preventive care screenings were discussed with patient today. Appropriate education was printed on patient's after visit summary.    Discussed risks and benefits of prostate cancer screening. We discussed the controversial history of PSA screening for prostate cancer in the [place] as well as the risk of over detection and over treatment of prostate cancer by way of PSA screening.  The patient understands that PSA blood testing is an imperfect way to screen for prostate cancer and that elevated PSA levels in the blood may also be caused by infection, inflammation, prostatic trauma or manipulation, urological procedures, or by benign prostatic enlargement.    The role of the digital rectal examination in prostate cancer screening  was also discussed and I discussed with him that there is large interobserver variability in the findings of digital rectal examination.    Counseling:  Alcohol/drug use: discussed moderation in alcohol intake, the recommendations for healthy alcohol use, and avoidance of illicit drug use.  Dental Health: discussed importance of regular tooth brushing, flossing, and dental visits.  Injury prevention: discussed safety/seat belts, safety helmets, smoke detectors, carbon dioxide detectors, and smoking near bedding or upholstery.  Sexual health: discussed sexually transmitted diseases, partner selection, use of condoms, avoidance of unintended pregnancy, and contraceptive alternatives.  Exercise: the importance of regular exercise/physical activity was discussed. Recommend exercise 3-5 times per week for at least 30 minutes.          History of Present Illness     Adult Annual Physical:  Patient presents for annual physical.     Diet and Physical Activity:  - Diet/Nutrition: well balanced diet.  - Exercise: no formal exercise.    Depression Screening:  - PHQ-2 Score: 0    General Health:  - Sleep: sleeps well.  - Hearing: normal hearing bilateral ears.  - Vision: no vision problems.  - Dental: regular dental visits.     Health:  - History of STDs: no.   - Urinary symptoms: none.     Review of Systems   Constitutional:  Negative for activity change, chills, fatigue and fever.   HENT:  Negative for congestion, ear pain, sinus pressure and sore throat.    Eyes:  Negative for redness, itching and visual disturbance.   Respiratory:  Negative for cough and shortness of breath.    Cardiovascular:  Negative for chest pain and palpitations.   Gastrointestinal:  Negative for abdominal pain, diarrhea and nausea.   Endocrine: Negative for cold intolerance and heat intolerance.   Genitourinary:  Negative for dysuria, flank pain and frequency.   Musculoskeletal:  Negative for arthralgias, back pain, gait problem and myalgias.    Skin:  Negative for color change.   Allergic/Immunologic: Negative for environmental allergies.   Neurological:  Negative for dizziness, numbness and headaches.   Psychiatric/Behavioral:  Negative for behavioral problems and sleep disturbance.      Medical History Reviewed by provider this encounter:       Current Outpatient Medications on File Prior to Visit   Medication Sig Dispense Refill    famotidine (PEPCID) 20 mg tablet Take 1 tablet (20 mg total) by mouth 2 (two) times a day (Patient taking differently: Take 20 mg by mouth daily) 180 tablet 1    levothyroxine 150 mcg tablet Take 1 tablet (150 mcg total) by mouth daily 180 tablet 0    lisinopril (ZESTRIL) 20 mg tablet Take 1 tablet (20 mg total) by mouth daily 90 tablet 1    ondansetron (Zofran ODT) 4 mg disintegrating tablet Take 1 tablet (4 mg total) by mouth every 6 (six) hours as needed for nausea or vomiting 20 tablet 0    oxyCODONE (ROXICODONE) 10 MG TABS Take 1 tablet (10 mg total) by mouth every 8 (eight) hours as needed for moderate pain Max Daily Amount: 30 mg 60 tablet 0    [DISCONTINUED] amLODIPine (NORVASC) 5 mg tablet Take 1 tablet (5 mg total) by mouth daily 100 tablet 1    [DISCONTINUED] diazepam (VALIUM) 5 mg tablet Take 1 tablet (5 mg total) by mouth daily as needed for anxiety 60 tablet 0    Lidocaine Viscous HCl (XYLOCAINE) 2 % mucosal solution Swish and spit 15 mL 4 (four) times a day as needed for mouth pain or discomfort (Patient not taking: Reported on 7/11/2024) 100 mL 0    naloxone (NARCAN) 4 mg/0.1 mL nasal spray Administer 1 spray into a nostril. If breathing does not return to normal or if breathing difficulty resumes after 2-3 minutes, give another dose in the other nostril using a new spray. (Patient not taking: Reported on 7/11/2024) 1 each 0     No current facility-administered medications on file prior to visit.      Social History     Tobacco Use    Smoking status: Never    Smokeless tobacco: Never   Vaping Use    Vaping  "status: Never Used   Substance and Sexual Activity    Alcohol use: Yes     Comment: socially    Drug use: No    Sexual activity: Not on file     Comment: high risk sexual behavior       Objective     /84 (BP Location: Left arm, Patient Position: Sitting, Cuff Size: Large)   Pulse 100   Temp 98.5 °F (36.9 °C) (Temporal)   Ht 5' 10\" (1.778 m)   Wt 104 kg (228 lb 9.6 oz)   BMI 32.80 kg/m²     Physical Exam  Vitals reviewed.   Constitutional:       General: He is not in acute distress.     Appearance: Normal appearance. He is well-developed.   HENT:      Head: Normocephalic and atraumatic.      Right Ear: Tympanic membrane, ear canal and external ear normal. There is no impacted cerumen.      Left Ear: Tympanic membrane, ear canal and external ear normal. There is no impacted cerumen.      Nose: Nose normal. No congestion or rhinorrhea.      Mouth/Throat:      Mouth: Mucous membranes are moist.      Pharynx: No oropharyngeal exudate or posterior oropharyngeal erythema.   Eyes:      General: No scleral icterus.        Right eye: No discharge.         Left eye: No discharge.      Extraocular Movements: Extraocular movements intact.      Conjunctiva/sclera: Conjunctivae normal.      Pupils: Pupils are equal, round, and reactive to light.   Neck:      Trachea: No tracheal deviation.   Cardiovascular:      Rate and Rhythm: Normal rate and regular rhythm.      Pulses: Normal pulses.           Dorsalis pedis pulses are 2+ on the right side and 2+ on the left side.        Posterior tibial pulses are 2+ on the right side and 2+ on the left side.      Heart sounds: Normal heart sounds. No murmur heard.     No friction rub. No gallop.   Pulmonary:      Effort: Pulmonary effort is normal. No respiratory distress.      Breath sounds: Normal breath sounds. No wheezing, rhonchi or rales.   Abdominal:      General: Bowel sounds are normal. There is no distension.      Palpations: Abdomen is soft.      Tenderness: There is no " abdominal tenderness. There is no guarding or rebound.   Musculoskeletal:         General: Normal range of motion.      Cervical back: Normal range of motion and neck supple.      Right lower leg: No edema.      Left lower leg: No edema.   Lymphadenopathy:      Head:      Right side of head: No submental or submandibular adenopathy.      Left side of head: No submental or submandibular adenopathy.      Cervical: No cervical adenopathy.      Right cervical: No superficial, deep or posterior cervical adenopathy.     Left cervical: No superficial, deep or posterior cervical adenopathy.   Skin:     General: Skin is warm and dry.      Findings: No erythema.   Neurological:      General: No focal deficit present.      Mental Status: He is alert and oriented to person, place, and time.      Cranial Nerves: No cranial nerve deficit.      Sensory: Sensation is intact. No sensory deficit.      Motor: Motor function is intact.   Psychiatric:         Attention and Perception: Attention and perception normal.         Mood and Affect: Mood is not anxious or depressed.         Speech: Speech normal.         Behavior: Behavior normal.         Thought Content: Thought content normal.         Judgment: Judgment normal.

## 2024-07-11 NOTE — PROGRESS NOTES
Assessment/Plan:   1. Hypertensive urgency  Today.  This time, continue with routine home monitoring as well as current treatment with amlodipine.  - amLODIPine (NORVASC) 5 mg tablet; Take 1 tablet (5 mg total) by mouth daily  Dispense: 100 tablet; Refill: 1    2. Cervical radiculopathy  Stable.  At this time, he has been taking his oxycodone regularly.  Continue with his current treatment.   reviewed, no abnormality seen today.  - naloxone (NARCAN) 4 mg/0.1 mL nasal spray; Administer 1 spray into a nostril. If no response after 2-3 minutes, give another dose in the other nostril using a new spray.  Dispense: 1 each; Refill: 1    3. Hypothyroidism, unspecified type  Stable.  Recheck TSH.  Continue with current dose of levothyroxine.  - TSH, 3rd generation with Free T4 reflex; Future    4. Chronic gastritis without bleeding, unspecified gastritis type  Stable.  At this time, continue with his current treatment of famotidine as needed.    5. Mixed hyperlipidemia  Recheck CMP and lipid panel.  Continue with a strict low-fat/low-cholesterol diet unfortunately has not completed his.  CT coronary calcium score.  - Comprehensive metabolic panel; Future  - Lipid Panel with Direct LDL reflex; Future    6.Fear of flying  Symptoms appear stable.  At the time, continue with diazepam as needed.  Follow-up with patient in 6 months.  - diazepam (VALIUM) 5 mg tablet; Take 1 tablet (5 mg total) by mouth daily as needed for anxiety  Dispense: 60 tablet; Refill: 0           Diagnoses and all orders for this visit:    Hypertensive urgency  -     amLODIPine (NORVASC) 5 mg tablet; Take 1 tablet (5 mg total) by mouth daily    Cervical radiculopathy  -     naloxone (NARCAN) 4 mg/0.1 mL nasal spray; Administer 1 spray into a nostril. If no response after 2-3 minutes, give another dose in the other nostril using a new spray.    Hypothyroidism, unspecified type  -     TSH, 3rd generation with Free T4 reflex; Future    Chronic gastritis  without bleeding, unspecified gastritis type    Mixed hyperlipidemia  -     Comprehensive metabolic panel; Future  -     Lipid Panel with Direct LDL reflex; Future    Screening for diabetes mellitus  -     Hemoglobin A1C; Future    Screening for iron deficiency anemia  -     CBC; Future    Fear of flying  -     diazepam (VALIUM) 5 mg tablet; Take 1 tablet (5 mg total) by mouth daily as needed for anxiety    Vision changes  -     Ambulatory Referral to Optometry; Future    Muscle cramping  -     Magnesium; Future          Subjective:       Chief Complaint   Patient presents with    Physical Exam      Patient ID: Sonia Fierro is a 59 y.o. male presents today for a follow-up on his chronic conditions.  He has hypertension, cervical radiculopathy, hypothyroidism chronic gastritis dyslipidemia.  He has been taking his medications regularly he denies adverse reactions with his medications.    HPI    Review of Systems   Constitutional:  Negative for activity change, chills, fatigue and fever.   HENT:  Negative for congestion, ear pain, sinus pressure and sore throat.    Eyes:  Negative for redness, itching and visual disturbance.   Respiratory:  Negative for cough and shortness of breath.    Cardiovascular:  Negative for chest pain and palpitations.   Gastrointestinal:  Negative for abdominal pain, diarrhea and nausea.   Endocrine: Negative for cold intolerance and heat intolerance.   Genitourinary:  Negative for dysuria, flank pain and frequency.   Musculoskeletal:  Negative for arthralgias, back pain, gait problem and myalgias.   Skin:  Negative for color change.   Allergic/Immunologic: Negative for environmental allergies.   Neurological:  Negative for dizziness, numbness and headaches.   Psychiatric/Behavioral:  Negative for behavioral problems and sleep disturbance.        The following portions of the patient's history were reviewed and updated as appropriate : past family history, past medical history, past social  history and past surgical history.    Current Outpatient Medications:     amLODIPine (NORVASC) 5 mg tablet, Take 1 tablet (5 mg total) by mouth daily, Disp: 100 tablet, Rfl: 1    diazepam (VALIUM) 5 mg tablet, Take 1 tablet (5 mg total) by mouth daily as needed for anxiety, Disp: 60 tablet, Rfl: 0    famotidine (PEPCID) 20 mg tablet, Take 1 tablet (20 mg total) by mouth 2 (two) times a day (Patient taking differently: Take 20 mg by mouth daily), Disp: 180 tablet, Rfl: 1    levothyroxine 150 mcg tablet, Take 1 tablet (150 mcg total) by mouth daily, Disp: 180 tablet, Rfl: 0    lisinopril (ZESTRIL) 20 mg tablet, Take 1 tablet (20 mg total) by mouth daily, Disp: 90 tablet, Rfl: 1    naloxone (NARCAN) 4 mg/0.1 mL nasal spray, Administer 1 spray into a nostril. If no response after 2-3 minutes, give another dose in the other nostril using a new spray., Disp: 1 each, Rfl: 1    ondansetron (Zofran ODT) 4 mg disintegrating tablet, Take 1 tablet (4 mg total) by mouth every 6 (six) hours as needed for nausea or vomiting, Disp: 20 tablet, Rfl: 0    oxyCODONE (ROXICODONE) 10 MG TABS, Take 1 tablet (10 mg total) by mouth every 8 (eight) hours as needed for moderate pain Max Daily Amount: 30 mg, Disp: 60 tablet, Rfl: 0    Lidocaine Viscous HCl (XYLOCAINE) 2 % mucosal solution, Swish and spit 15 mL 4 (four) times a day as needed for mouth pain or discomfort (Patient not taking: Reported on 7/11/2024), Disp: 100 mL, Rfl: 0    naloxone (NARCAN) 4 mg/0.1 mL nasal spray, Administer 1 spray into a nostril. If breathing does not return to normal or if breathing difficulty resumes after 2-3 minutes, give another dose in the other nostril using a new spray. (Patient not taking: Reported on 7/11/2024), Disp: 1 each, Rfl: 0         Objective:         Vitals:    07/11/24 1123   BP: 134/84   BP Location: Left arm   Patient Position: Sitting   Cuff Size: Large   Pulse: 100   Temp: 98.5 °F (36.9 °C)   TempSrc: Temporal   Weight: 104 kg (228 lb  "9.6 oz)   Height: 5' 10\" (1.778 m)     Physical Exam  Vitals reviewed.   Constitutional:       General: He is not in acute distress.     Appearance: Normal appearance. He is well-developed.   HENT:      Head: Normocephalic and atraumatic.      Right Ear: Tympanic membrane, ear canal and external ear normal. There is no impacted cerumen.      Left Ear: Tympanic membrane, ear canal and external ear normal. There is no impacted cerumen.      Nose: Nose normal. No congestion or rhinorrhea.      Mouth/Throat:      Mouth: Mucous membranes are moist.      Pharynx: No oropharyngeal exudate or posterior oropharyngeal erythema.   Eyes:      General: No scleral icterus.        Right eye: No discharge.         Left eye: No discharge.      Extraocular Movements: Extraocular movements intact.      Conjunctiva/sclera: Conjunctivae normal.      Pupils: Pupils are equal, round, and reactive to light.   Neck:      Trachea: No tracheal deviation.   Cardiovascular:      Rate and Rhythm: Normal rate and regular rhythm.      Pulses: Normal pulses.           Dorsalis pedis pulses are 2+ on the right side and 2+ on the left side.        Posterior tibial pulses are 2+ on the right side and 2+ on the left side.      Heart sounds: Normal heart sounds. No murmur heard.     No friction rub. No gallop.   Pulmonary:      Effort: Pulmonary effort is normal. No respiratory distress.      Breath sounds: Normal breath sounds. No wheezing, rhonchi or rales.   Abdominal:      General: Bowel sounds are normal. There is no distension.      Palpations: Abdomen is soft.      Tenderness: There is no abdominal tenderness. There is no guarding or rebound.   Musculoskeletal:         General: Normal range of motion.      Cervical back: Normal range of motion and neck supple.      Right lower leg: No edema.      Left lower leg: No edema.   Lymphadenopathy:      Head:      Right side of head: No submental or submandibular adenopathy.      Left side of head: No " submental or submandibular adenopathy.      Cervical: No cervical adenopathy.      Right cervical: No superficial, deep or posterior cervical adenopathy.     Left cervical: No superficial, deep or posterior cervical adenopathy.   Skin:     General: Skin is warm and dry.      Findings: No erythema.   Neurological:      General: No focal deficit present.      Mental Status: He is alert and oriented to person, place, and time.      Cranial Nerves: No cranial nerve deficit.      Sensory: Sensation is intact. No sensory deficit.      Motor: Motor function is intact.   Psychiatric:         Attention and Perception: Attention and perception normal.         Mood and Affect: Mood is not anxious or depressed.         Speech: Speech normal.         Behavior: Behavior normal.         Thought Content: Thought content normal.         Judgment: Judgment normal.

## 2024-07-18 DIAGNOSIS — K29.70 GASTRITIS: ICD-10-CM

## 2024-07-18 DIAGNOSIS — R11.2 NAUSEA AND VOMITING: ICD-10-CM

## 2024-07-19 RX ORDER — FAMOTIDINE 20 MG/1
20 TABLET, FILM COATED ORAL DAILY
Qty: 90 TABLET | Refills: 1 | Status: SHIPPED | OUTPATIENT
Start: 2024-07-19

## 2024-07-29 DIAGNOSIS — M54.12 CERVICAL RADICULOPATHY: ICD-10-CM

## 2024-07-29 RX ORDER — OXYCODONE HYDROCHLORIDE 10 MG/1
10 TABLET ORAL EVERY 8 HOURS PRN
Qty: 60 TABLET | Refills: 0 | Status: SHIPPED | OUTPATIENT
Start: 2024-07-29

## 2024-07-30 ENCOUNTER — TELEPHONE (OUTPATIENT)
Dept: FAMILY MEDICINE CLINIC | Facility: CLINIC | Age: 59
End: 2024-07-30

## 2024-08-02 NOTE — TELEPHONE ENCOUNTER
PA for oxycodone 10 mg tablet SUBMITTED     via    [x][id number]  []Surescripts-Case ID #   []Faxed to plan   []Other website   []Phone call Case ID #     Office notes sent, clinical questions answered. Awaiting determination    Turnaround time for your insurance to make a decision on your Prior Authorization can take 7-21 business days.

## 2024-08-02 NOTE — TELEPHONE ENCOUNTER
PA for oxycodone 10 mg Denied    Reason:(Screenshot if applicable)        Message sent to office clinical pool Yes    Denial letter scanned into Media Yes    Appeal started No ( Provider will need to decide if appeal is warranted and send clinical documentation to Prior Authorization Team for initiation.)    **Please follow up with your patient regarding denial and next steps**

## 2024-08-12 ENCOUNTER — PATIENT MESSAGE (OUTPATIENT)
Dept: FAMILY MEDICINE CLINIC | Facility: CLINIC | Age: 59
End: 2024-08-12

## 2024-08-15 ENCOUNTER — OFFICE VISIT (OUTPATIENT)
Dept: FAMILY MEDICINE CLINIC | Facility: CLINIC | Age: 59
End: 2024-08-15
Payer: COMMERCIAL

## 2024-08-15 VITALS
HEIGHT: 70 IN | TEMPERATURE: 98.3 F | WEIGHT: 235.8 LBS | DIASTOLIC BLOOD PRESSURE: 72 MMHG | OXYGEN SATURATION: 98 % | HEART RATE: 77 BPM | BODY MASS INDEX: 33.76 KG/M2 | SYSTOLIC BLOOD PRESSURE: 130 MMHG

## 2024-08-15 DIAGNOSIS — G89.4 CHRONIC PAIN SYNDROME: ICD-10-CM

## 2024-08-15 DIAGNOSIS — F11.20 CONTINUOUS OPIOID DEPENDENCE (HCC): ICD-10-CM

## 2024-08-15 DIAGNOSIS — M54.12 CERVICAL RADICULOPATHY: Primary | ICD-10-CM

## 2024-08-15 DIAGNOSIS — M50.20 DISPLACEMENT OF CERVICAL INTERVERTEBRAL DISC WITHOUT MYELOPATHY: ICD-10-CM

## 2024-08-15 PROCEDURE — 99214 OFFICE O/P EST MOD 30 MIN: CPT | Performed by: FAMILY MEDICINE

## 2024-08-15 NOTE — PROGRESS NOTES
Assessment/Plan:   1. Cervical radiculopathy/displacement of cervical intervertebral disc without myelopathy/chronic opioid dependence/chronic pain syndrome  Reviewed patient's symptoms today.  Some, his symptoms have been persisting.  He has been using oxycodone which has been effective for him.  He has been using this medication for multiple years.  Previously, he has been on treatment with physical therapy which appeared to worsen his symptoms.  He was seen by pain management in the past.  He did have multiple epidural spinal injections.  He has not had any relief with this treatment as well.  He has been on multiple medications as well including tramadol, gabapentin, Lyrica.  These medications caused significant sedation and he was not able to take them any further.  He has been doing well with his current treatment.  Yearly urine drug screen was taken today.  Follow-up if any symptoms worsen.             Diagnoses and all orders for this visit:    Cervical radiculopathy    Displacement of cervical intervertebral disc without myelopathy          Subjective:       No chief complaint on file.     Patient ID: Sonia Fierro is a 59 y.o. male presents today for a follow-up on his chronic pain.  He he would like to have a discussion concerning his insurance denial.    HPI    Review of Systems   Constitutional:  Negative for activity change, chills, fatigue and fever.   HENT:  Negative for congestion, ear pain, sinus pressure and sore throat.    Eyes:  Negative for redness, itching and visual disturbance.   Respiratory:  Negative for cough and shortness of breath.    Cardiovascular:  Negative for chest pain and palpitations.   Gastrointestinal:  Negative for abdominal pain, diarrhea and nausea.   Endocrine: Negative for cold intolerance and heat intolerance.   Genitourinary:  Negative for dysuria, flank pain and frequency.   Musculoskeletal:  Negative for arthralgias, back pain, gait problem and myalgias.   Skin:   Negative for color change.   Allergic/Immunologic: Negative for environmental allergies.   Neurological:  Negative for dizziness, numbness and headaches.   Psychiatric/Behavioral:  Negative for behavioral problems and sleep disturbance.        The following portions of the patient's history were reviewed and updated as appropriate : past family history, past medical history, past social history and past surgical history.    Current Outpatient Medications:     amLODIPine (NORVASC) 5 mg tablet, Take 1 tablet (5 mg total) by mouth daily, Disp: 100 tablet, Rfl: 1    diazepam (VALIUM) 5 mg tablet, Take 1 tablet (5 mg total) by mouth daily as needed for anxiety, Disp: 60 tablet, Rfl: 0    famotidine (PEPCID) 20 mg tablet, Take 1 tablet (20 mg total) by mouth daily, Disp: 90 tablet, Rfl: 1    levothyroxine 150 mcg tablet, Take 1 tablet (150 mcg total) by mouth daily, Disp: 180 tablet, Rfl: 0    lisinopril (ZESTRIL) 20 mg tablet, Take 1 tablet (20 mg total) by mouth daily, Disp: 90 tablet, Rfl: 1    naloxone (NARCAN) 4 mg/0.1 mL nasal spray, Administer 1 spray into a nostril. If no response after 2-3 minutes, give another dose in the other nostril using a new spray., Disp: 1 each, Rfl: 1    ondansetron (Zofran ODT) 4 mg disintegrating tablet, Take 1 tablet (4 mg total) by mouth every 6 (six) hours as needed for nausea or vomiting, Disp: 20 tablet, Rfl: 0    oxyCODONE (ROXICODONE) 10 MG TABS, Take 1 tablet (10 mg total) by mouth every 8 (eight) hours as needed for moderate pain Max Daily Amount: 30 mg, Disp: 60 tablet, Rfl: 0    Lidocaine Viscous HCl (XYLOCAINE) 2 % mucosal solution, Swish and spit 15 mL 4 (four) times a day as needed for mouth pain or discomfort (Patient not taking: Reported on 7/11/2024), Disp: 100 mL, Rfl: 0    naloxone (NARCAN) 4 mg/0.1 mL nasal spray, Administer 1 spray into a nostril. If breathing does not return to normal or if breathing difficulty resumes after 2-3 minutes, give another dose in the  "other nostril using a new spray. (Patient not taking: Reported on 7/11/2024), Disp: 1 each, Rfl: 0         Objective:         Vitals:    08/15/24 1212   BP: 130/72   BP Location: Left arm   Patient Position: Sitting   Cuff Size: Large   Pulse: 77   Temp: 98.3 °F (36.8 °C)   TempSrc: Temporal   SpO2: 98%   Weight: 107 kg (235 lb 12.8 oz)   Height: 5' 10\" (1.778 m)     Physical Exam  Vitals reviewed.   Constitutional:       General: He is not in acute distress.     Appearance: Normal appearance. He is well-developed.   HENT:      Head: Normocephalic and atraumatic.      Right Ear: Tympanic membrane, ear canal and external ear normal. There is no impacted cerumen.      Left Ear: Tympanic membrane, ear canal and external ear normal. There is no impacted cerumen.      Nose: Nose normal. No congestion or rhinorrhea.      Mouth/Throat:      Mouth: Mucous membranes are moist.      Pharynx: No oropharyngeal exudate or posterior oropharyngeal erythema.   Eyes:      General: No scleral icterus.        Right eye: No discharge.         Left eye: No discharge.      Extraocular Movements: Extraocular movements intact.      Conjunctiva/sclera: Conjunctivae normal.      Pupils: Pupils are equal, round, and reactive to light.   Neck:      Trachea: No tracheal deviation.   Cardiovascular:      Rate and Rhythm: Normal rate and regular rhythm.      Pulses: Normal pulses.           Dorsalis pedis pulses are 2+ on the right side and 2+ on the left side.        Posterior tibial pulses are 2+ on the right side and 2+ on the left side.      Heart sounds: Normal heart sounds. No murmur heard.     No friction rub. No gallop.   Pulmonary:      Effort: Pulmonary effort is normal. No respiratory distress.      Breath sounds: Normal breath sounds. No wheezing, rhonchi or rales.   Abdominal:      General: Bowel sounds are normal. There is no distension.      Palpations: Abdomen is soft.      Tenderness: There is no abdominal tenderness. There is no " guarding or rebound.   Musculoskeletal:         General: Normal range of motion.      Cervical back: Normal range of motion and neck supple.      Right lower leg: No edema.      Left lower leg: No edema.   Lymphadenopathy:      Head:      Right side of head: No submental or submandibular adenopathy.      Left side of head: No submental or submandibular adenopathy.      Cervical: No cervical adenopathy.      Right cervical: No superficial, deep or posterior cervical adenopathy.     Left cervical: No superficial, deep or posterior cervical adenopathy.   Skin:     General: Skin is warm and dry.      Findings: No erythema.   Neurological:      General: No focal deficit present.      Mental Status: He is alert and oriented to person, place, and time.      Cranial Nerves: No cranial nerve deficit.      Sensory: Sensation is intact. No sensory deficit.      Motor: Motor function is intact.   Psychiatric:         Attention and Perception: Attention and perception normal.         Mood and Affect: Mood is not anxious or depressed.         Speech: Speech normal.         Behavior: Behavior normal.         Thought Content: Thought content normal.         Judgment: Judgment normal.

## 2024-08-18 LAB
4OH-XYLAZINE UR QL CFM: NEGATIVE NG/ML
6MAM UR QL CFM: NEGATIVE NG/ML
7AMINOCLONAZEPAM UR QL CFM: NEGATIVE NG/ML
A-OH ALPRAZ UR QL CFM: NEGATIVE NG/ML
ACCEPTABLE CREAT UR QL: NORMAL MG/DL
ACCEPTIBLE SP GR UR QL: NORMAL
AMPHET UR QL CFM: NEGATIVE NG/ML
BUPRENORPHINE UR QL CFM: NEGATIVE NG/ML
BUTALBITAL UR QL CFM: NEGATIVE NG/ML
BZE UR QL CFM: NEGATIVE NG/ML
CODEINE UR QL CFM: NEGATIVE NG/ML
EDDP UR QL CFM: NEGATIVE NG/ML
ETHYL GLUCURONIDE UR QL CFM: ABNORMAL NG/ML
ETHYL SULFATE UR QL SCN: ABNORMAL NG/ML
EUTYLONE UR QL: NEGATIVE NG/ML
FENTANYL UR QL CFM: NEGATIVE NG/ML
GLIADIN IGG SER IA-ACNC: NEGATIVE NG/ML
HYDROCODONE UR QL CFM: NEGATIVE NG/ML
HYDROMORPHONE UR QL CFM: NEGATIVE NG/ML
LORAZEPAM UR QL CFM: NEGATIVE NG/ML
ME-PHENIDATE UR QL CFM: NEGATIVE NG/ML
MEPERIDINE UR QL CFM: NEGATIVE NG/ML
METHADONE UR QL CFM: NEGATIVE NG/ML
METHAMPHET UR QL CFM: NEGATIVE NG/ML
MORPHINE UR QL CFM: NEGATIVE NG/ML
NALTREXONE UR QL CFM: NEGATIVE NG/ML
NITRITE UR QL: NORMAL UG/ML
NORBUPRENORPHINE UR QL CFM: NEGATIVE NG/ML
NORDIAZEPAM UR QL CFM: NORMAL NG/ML
NORFENTANYL UR QL CFM: NEGATIVE NG/ML
NORHYDROCODONE UR QL CFM: NEGATIVE NG/ML
NORMEPERIDINE UR QL CFM: NEGATIVE NG/ML
NOROXYCODONE UR QL CFM: NORMAL NG/ML
OXAZEPAM UR QL CFM: NORMAL NG/ML
OXYCODONE UR QL CFM: NORMAL NG/ML
OXYMORPHONE UR QL CFM: NORMAL NG/ML
PARA-FLUOROFENTANYL QUANTIFICATION: NORMAL NG/ML
PHENOBARB UR QL CFM: NEGATIVE NG/ML
RESULT ALL_PRESCRIBED MEDS AND SPECIAL INSTRUCTIONS: NORMAL
SECOBARBITAL UR QL CFM: NEGATIVE NG/ML
SL AMB 4-ANPP QUANTIFICATION: NORMAL NG/ML
SL AMB 5F-ADB-M7 METABOLITE QUANTIFICATION: NEGATIVE NG/ML
SL AMB 7-OH-MITRAGYNINE (KRATOM ALKALOID) QUANTIFICATION: NEGATIVE NG/ML
SL AMB AB-FUBINACA-M3 METABOLITE QUANTIFICATION: NEGATIVE NG/ML
SL AMB ACETYL FENTANYL QUANTIFICATION: NORMAL NG/ML
SL AMB ACETYL NORFENTANYL QUANTIFICATION: NORMAL NG/ML
SL AMB ACRYL FENTANYL QUANTIFICATION: NORMAL NG/ML
SL AMB CARFENTANIL QUANTIFICATION: NORMAL NG/ML
SL AMB CTHC (MARIJUANA METABOLITE) QUANTIFICATION: NEGATIVE NG/ML
SL AMB DEXTRORPHAN (DEXTROMETHORPHAN METABOLITE) QUANT: NEGATIVE NG/ML
SL AMB GABAPENTIN QUANTIFICATION: NEGATIVE NG/ML
SL AMB JWH018 METABOLITE QUANTIFICATION: NEGATIVE NG/ML
SL AMB JWH073 METABOLITE QUANTIFICATION: NEGATIVE NG/ML
SL AMB MDMB-FUBINACA-M1 METABOLITE QUANTIFICATION: NEGATIVE NG/ML
SL AMB METHYLONE QUANTIFICATION: NEGATIVE NG/ML
SL AMB N-DESMETHYL-TRAMADOL QUANTIFICATION: NEGATIVE NG/ML
SL AMB PHENTERMINE QUANTIFICATION: NEGATIVE NG/ML
SL AMB PREGABALIN QUANTIFICATION: NEGATIVE NG/ML
SL AMB RCS4 METABOLITE QUANTIFICATION: NEGATIVE NG/ML
SL AMB RITALINIC ACID QUANTIFICATION: NEGATIVE NG/ML
SMOOTH MUSCLE AB TITR SER IF: NEGATIVE NG/ML
SPECIMEN DRAWN SERPL: NEGATIVE NG/ML
SPECIMEN PH ACCEPTABLE UR: NORMAL
TAPENTADOL UR QL CFM: NEGATIVE NG/ML
TEMAZEPAM UR QL CFM: NORMAL NG/ML
TRAMADOL UR QL CFM: NEGATIVE NG/ML
URATE/CREAT 24H UR: NEGATIVE NG/ML
XYLAZINE UR QL CFM: NEGATIVE NG/ML

## 2024-08-22 ENCOUNTER — TELEPHONE (OUTPATIENT)
Age: 59
End: 2024-08-22

## 2024-08-22 NOTE — TELEPHONE ENCOUNTER
Pt called in requesting Dr. Maxwell to call him back when ever he is free as patient would only want to speak to doctor in regards to his pain medication no one else. Kindly call back patient. Thanks

## 2024-08-29 ENCOUNTER — TELEPHONE (OUTPATIENT)
Age: 59
End: 2024-08-29

## 2024-08-29 DIAGNOSIS — F40.243 FEAR OF FLYING: ICD-10-CM

## 2024-08-29 DIAGNOSIS — I16.0 HYPERTENSIVE URGENCY: ICD-10-CM

## 2024-08-29 DIAGNOSIS — M54.12 CERVICAL RADICULOPATHY: ICD-10-CM

## 2024-08-29 RX ORDER — LISINOPRIL 20 MG/1
20 TABLET ORAL DAILY
Qty: 90 TABLET | Refills: 1 | Status: SHIPPED | OUTPATIENT
Start: 2024-08-29

## 2024-08-29 RX ORDER — OXYCODONE HYDROCHLORIDE 10 MG/1
10 TABLET ORAL EVERY 8 HOURS PRN
Qty: 60 TABLET | Refills: 0 | Status: SHIPPED | OUTPATIENT
Start: 2024-08-29

## 2024-08-29 RX ORDER — DIAZEPAM 5 MG
5 TABLET ORAL DAILY PRN
Qty: 60 TABLET | Refills: 0 | Status: SHIPPED | OUTPATIENT
Start: 2024-08-29

## 2024-08-29 NOTE — TELEPHONE ENCOUNTER
Pt called and said if someone can please call him once Dr. Maxwell signs off the scripts for the oxycodone and the diazepam. And that it please be sent to [facility] pharmacy. Pt does not want to go two times to the pharmacy to  all his meds, and he only has one left for the lisinopril. Looks like that one was sent but not the others. Please review and advise. Thank you.

## 2024-09-03 ENCOUNTER — OFFICE VISIT (OUTPATIENT)
Dept: FAMILY MEDICINE CLINIC | Facility: CLINIC | Age: 59
End: 2024-09-03
Payer: COMMERCIAL

## 2024-09-03 VITALS
OXYGEN SATURATION: 97 % | WEIGHT: 238 LBS | BODY MASS INDEX: 34.15 KG/M2 | HEART RATE: 86 BPM | DIASTOLIC BLOOD PRESSURE: 80 MMHG | TEMPERATURE: 97.7 F | SYSTOLIC BLOOD PRESSURE: 142 MMHG

## 2024-09-03 DIAGNOSIS — J20.9 ACUTE BRONCHITIS, UNSPECIFIED ORGANISM: Primary | ICD-10-CM

## 2024-09-03 PROCEDURE — 99214 OFFICE O/P EST MOD 30 MIN: CPT | Performed by: FAMILY MEDICINE

## 2024-09-03 NOTE — PROGRESS NOTES
Assessment/Plan:   1. Acute bronchitis, unspecified organism  Reviewed patient symptoms today.  Symptoms appear secondary to acute bronchitis.  Continue supportive care.  Maintain hydration.  Take over-the-counter Mucinex.  Will start treatment with Augmentin.  Follow-up if any symptoms persist.  - amoxicillin-clavulanate (AUGMENTIN) 875-125 mg per tablet; Take 1 tablet by mouth every 12 (twelve) hours for 7 days  Dispense: 14 tablet; Refill: 0           Diagnoses and all orders for this visit:    Acute bronchitis, unspecified organism  -     amoxicillin-clavulanate (AUGMENTIN) 875-125 mg per tablet; Take 1 tablet by mouth every 12 (twelve) hours for 7 days          Subjective:       Chief Complaint   Patient presents with    Cough     Started about 1 week ago  Constant coughing  Negative COVID test   Started taking Augmentin  3 days ago and feeling much better        Patient ID: Sonia Fierro is a 59 y.o. male.    Cough  This is a new problem. The current episode started in the past 7 days. The problem has been unchanged. The problem occurs constantly. The cough is Productive of sputum. Associated symptoms include wheezing. Pertinent negatives include no chest pain, chills, ear congestion, ear pain, eye redness, fever, headaches, myalgias, nasal congestion, rhinorrhea, sore throat or shortness of breath. Nothing aggravates the symptoms. Treatments tried: augmentin. The treatment provided moderate relief. There is no history of environmental allergies.       Review of Systems   Constitutional:  Negative for activity change, chills, fatigue and fever.   HENT:  Negative for congestion, ear pain, rhinorrhea, sinus pressure and sore throat.    Eyes:  Negative for redness, itching and visual disturbance.   Respiratory:  Positive for cough and wheezing. Negative for shortness of breath.    Cardiovascular:  Negative for chest pain and palpitations.   Gastrointestinal:  Negative for abdominal pain, diarrhea and nausea.    Endocrine: Negative for cold intolerance and heat intolerance.   Genitourinary:  Negative for dysuria, flank pain and frequency.   Musculoskeletal:  Negative for arthralgias, back pain, gait problem and myalgias.   Skin:  Negative for color change.   Allergic/Immunologic: Negative for environmental allergies.   Neurological:  Negative for dizziness, numbness and headaches.   Psychiatric/Behavioral:  Negative for behavioral problems and sleep disturbance.        The following portions of the patient's history were reviewed and updated as appropriate : past family history, past medical history, past social history and past surgical history.    Current Outpatient Medications:     amLODIPine (NORVASC) 5 mg tablet, Take 1 tablet (5 mg total) by mouth daily, Disp: 100 tablet, Rfl: 1    amoxicillin-clavulanate (AUGMENTIN) 875-125 mg per tablet, Take 1 tablet by mouth every 12 (twelve) hours for 7 days, Disp: 14 tablet, Rfl: 0    diazepam (VALIUM) 5 mg tablet, Take 1 tablet (5 mg total) by mouth daily as needed for anxiety, Disp: 60 tablet, Rfl: 0    famotidine (PEPCID) 20 mg tablet, Take 1 tablet (20 mg total) by mouth daily, Disp: 90 tablet, Rfl: 1    levothyroxine 150 mcg tablet, Take 1 tablet (150 mcg total) by mouth daily, Disp: 180 tablet, Rfl: 0    lisinopril (ZESTRIL) 20 mg tablet, Take 1 tablet (20 mg total) by mouth daily, Disp: 90 tablet, Rfl: 1    naloxone (NARCAN) 4 mg/0.1 mL nasal spray, Administer 1 spray into a nostril. If no response after 2-3 minutes, give another dose in the other nostril using a new spray., Disp: 1 each, Rfl: 1    ondansetron (Zofran ODT) 4 mg disintegrating tablet, Take 1 tablet (4 mg total) by mouth every 6 (six) hours as needed for nausea or vomiting, Disp: 20 tablet, Rfl: 0    oxyCODONE (ROXICODONE) 10 MG TABS, Take 1 tablet (10 mg total) by mouth every 8 (eight) hours as needed for moderate pain Max Daily Amount: 30 mg, Disp: 60 tablet, Rfl: 0    Lidocaine Viscous HCl (XYLOCAINE)  2 % mucosal solution, Swish and spit 15 mL 4 (four) times a day as needed for mouth pain or discomfort (Patient not taking: Reported on 7/11/2024), Disp: 100 mL, Rfl: 0    naloxone (NARCAN) 4 mg/0.1 mL nasal spray, Administer 1 spray into a nostril. If breathing does not return to normal or if breathing difficulty resumes after 2-3 minutes, give another dose in the other nostril using a new spray. (Patient not taking: Reported on 7/11/2024), Disp: 1 each, Rfl: 0         Objective:         Vitals:    09/03/24 1857   BP: 142/80   BP Location: Left arm   Patient Position: Sitting   Cuff Size: Large   Pulse: 86   Temp: 97.7 °F (36.5 °C)   TempSrc: Temporal   SpO2: 97%   Weight: 108 kg (238 lb)     Physical Exam  Vitals reviewed.   Constitutional:       General: He is not in acute distress.     Appearance: Normal appearance. He is well-developed.   HENT:      Head: Normocephalic and atraumatic.      Right Ear: Tympanic membrane, ear canal and external ear normal. There is no impacted cerumen.      Left Ear: Tympanic membrane, ear canal and external ear normal. There is no impacted cerumen.      Nose: Nose normal. No congestion or rhinorrhea.      Mouth/Throat:      Mouth: Mucous membranes are moist.      Pharynx: No oropharyngeal exudate or posterior oropharyngeal erythema.   Eyes:      General: No scleral icterus.        Right eye: No discharge.         Left eye: No discharge.      Extraocular Movements: Extraocular movements intact.      Conjunctiva/sclera: Conjunctivae normal.      Pupils: Pupils are equal, round, and reactive to light.   Neck:      Trachea: No tracheal deviation.   Cardiovascular:      Rate and Rhythm: Normal rate and regular rhythm.      Pulses: Normal pulses.           Dorsalis pedis pulses are 2+ on the right side and 2+ on the left side.        Posterior tibial pulses are 2+ on the right side and 2+ on the left side.      Heart sounds: Normal heart sounds. No murmur heard.     No friction rub. No  gallop.   Pulmonary:      Effort: Pulmonary effort is normal. No respiratory distress.      Breath sounds: Normal breath sounds. No wheezing, rhonchi or rales.   Abdominal:      General: Bowel sounds are normal. There is no distension.      Palpations: Abdomen is soft.      Tenderness: There is no abdominal tenderness. There is no guarding or rebound.   Musculoskeletal:         General: Normal range of motion.      Cervical back: Normal range of motion and neck supple.      Right lower leg: No edema.      Left lower leg: No edema.   Lymphadenopathy:      Head:      Right side of head: No submental or submandibular adenopathy.      Left side of head: No submental or submandibular adenopathy.      Cervical: No cervical adenopathy.      Right cervical: No superficial, deep or posterior cervical adenopathy.     Left cervical: No superficial, deep or posterior cervical adenopathy.   Skin:     General: Skin is warm and dry.      Findings: No erythema.   Neurological:      General: No focal deficit present.      Mental Status: He is alert and oriented to person, place, and time.      Cranial Nerves: No cranial nerve deficit.      Sensory: Sensation is intact. No sensory deficit.      Motor: Motor function is intact.   Psychiatric:         Attention and Perception: Attention and perception normal.         Mood and Affect: Mood is not anxious or depressed.         Speech: Speech normal.         Behavior: Behavior normal.         Thought Content: Thought content normal.         Judgment: Judgment normal.

## 2024-09-06 NOTE — PATIENT COMMUNICATION
Patient called and stated that the insurance company had their hearing on wether they will approve the oxycodone.  He stated that his PCP wrote up a letter stating on what he has tried and why they should approve this meds that he has be on for yrs.  Pls re send to fax number    and add case number [id number].  Pls update the patient at

## 2024-09-10 ENCOUNTER — TELEPHONE (OUTPATIENT)
Age: 59
End: 2024-09-10

## 2024-09-10 NOTE — TELEPHONE ENCOUNTER
Patient is requesting a return call from practice manager to discuss and verify the correct insurance was/is being billed.    He explained receiving an EOB from his old insurance company ([employer]) for an OV on 8/15/24, stating he will owe over $400.  He has not been with that insurance company for over 2 years.    Please contact him on his mobile number of [phone].

## 2024-09-10 NOTE — TELEPHONE ENCOUNTER
Called patient and gave him number for billing as I only see that we have been billing [facility].

## 2024-09-16 ENCOUNTER — TELEPHONE (OUTPATIENT)
Age: 59
End: 2024-09-16

## 2024-09-16 NOTE — TELEPHONE ENCOUNTER
Patient called the office asking if the provider could contact him back regarding his medication Oxycodone. Patient states insurance is giving him a hard time approving this medication as it was sent to the pharmacy as oxycodone XR. Please review and advise pt only wants to speak to Dr. Maxwell regarding this.

## 2024-09-17 NOTE — TELEPHONE ENCOUNTER
I scanned in a denial letter and attached it to you for review they are denying it because records and details provided do not show that you have a medical need for for this medication

## 2024-09-24 DIAGNOSIS — M54.12 CERVICAL RADICULOPATHY: ICD-10-CM

## 2024-09-24 RX ORDER — OXYCODONE HYDROCHLORIDE 10 MG/1
10 TABLET ORAL EVERY 8 HOURS PRN
Qty: 60 TABLET | Refills: 0 | Status: SHIPPED | OUTPATIENT
Start: 2024-09-24

## 2024-10-16 DIAGNOSIS — M54.12 CERVICAL RADICULOPATHY: ICD-10-CM

## 2024-10-16 RX ORDER — OXYCODONE HYDROCHLORIDE 10 MG/1
10 TABLET ORAL EVERY 8 HOURS PRN
Qty: 60 TABLET | Refills: 0 | Status: SHIPPED | OUTPATIENT
Start: 2024-10-16 | End: 2024-10-18 | Stop reason: SDUPTHER

## 2024-10-18 RX ORDER — OXYCODONE HYDROCHLORIDE 10 MG/1
10 TABLET ORAL EVERY 8 HOURS PRN
Qty: 60 TABLET | Refills: 0 | Status: SHIPPED | OUTPATIENT
Start: 2024-10-18

## 2024-10-18 NOTE — TELEPHONE ENCOUNTER
Patient would like his medication resent to another pharmacy [facility] in [place]. Please review and advise

## 2024-10-28 ENCOUNTER — TELEPHONE (OUTPATIENT)
Age: 59
End: 2024-10-28

## 2024-10-28 NOTE — TELEPHONE ENCOUNTER
Abbie from [facility] called in to patients oxycodone. Abbie stated that medication keeps getting denied due to medication being an extended release and is not sure if this is on the pharmacy side or if how the script is written. Abbie stated if provider intends for the Oxycodone to be an immediate release it will need to be submitted in that way. Abbie would like a call back in regards to medication and clarification Abbie best call back number is [phone]

## 2024-10-29 NOTE — TELEPHONE ENCOUNTER
Abbie from [facility] returned a call regarding the patients oxycodone. I did advise per notes that the patient was on the regular oxycodone.     Abbie is requesting a call back and wants to to know if she should with drawl the appeal. Please call Abbie back at  and advise.     Thank you

## 2024-11-04 NOTE — TELEPHONE ENCOUNTER
Pt called back and I tried calling office was on for a while. Pt wants to speak to Leonarda BASSETT. Please call back [phone] thank you.

## 2024-11-04 NOTE — TELEPHONE ENCOUNTER
Called pt left msg on vm letting him know the appeal was dropped on the Oxy IR if any questions to reach out to me Leonarda BASSETT

## 2024-11-04 NOTE — TELEPHONE ENCOUNTER
Patient calling for follow up on status of medication issue. I advised the notes listed. patient requesting to speak with Leonarda Llanes transferred in to Sidney who advised Lorraine of the message. Lorraine will call patient as soon as she is finished taking care of current patient at her desk.  Patient thanks us for our help.

## 2024-11-07 DIAGNOSIS — F40.243 FEAR OF FLYING: ICD-10-CM

## 2024-11-07 DIAGNOSIS — M54.12 CERVICAL RADICULOPATHY: ICD-10-CM

## 2024-11-08 RX ORDER — DIAZEPAM 5 MG/1
5 TABLET ORAL DAILY PRN
Qty: 60 TABLET | Refills: 0 | Status: SHIPPED | OUTPATIENT
Start: 2024-11-08

## 2024-11-08 RX ORDER — OXYCODONE HYDROCHLORIDE 10 MG/1
10 TABLET ORAL EVERY 8 HOURS PRN
Qty: 60 TABLET | Refills: 0 | Status: SHIPPED | OUTPATIENT
Start: 2024-11-08

## 2024-11-22 ENCOUNTER — OFFICE VISIT (OUTPATIENT)
Dept: FAMILY MEDICINE CLINIC | Facility: CLINIC | Age: 59
End: 2024-11-22
Payer: COMMERCIAL

## 2024-11-22 VITALS
SYSTOLIC BLOOD PRESSURE: 128 MMHG | OXYGEN SATURATION: 98 % | DIASTOLIC BLOOD PRESSURE: 80 MMHG | TEMPERATURE: 98.2 F | HEIGHT: 70 IN | HEART RATE: 83 BPM | WEIGHT: 235 LBS | BODY MASS INDEX: 33.64 KG/M2

## 2024-11-22 DIAGNOSIS — M54.16 LUMBAR RADICULOPATHY: Primary | ICD-10-CM

## 2024-11-22 PROCEDURE — 99214 OFFICE O/P EST MOD 30 MIN: CPT | Performed by: FAMILY MEDICINE

## 2024-11-22 RX ORDER — METHOCARBAMOL 500 MG/1
500 TABLET, FILM COATED ORAL 2 TIMES DAILY PRN
Qty: 40 TABLET | Refills: 0 | Status: SHIPPED | OUTPATIENT
Start: 2024-11-22

## 2024-11-22 RX ORDER — PREDNISONE 10 MG/1
TABLET ORAL
Qty: 21 TABLET | Refills: 0 | Status: SHIPPED | OUTPATIENT
Start: 2024-11-22

## 2024-11-22 NOTE — PROGRESS NOTES
Name: Sonia Fierro      : 1965      MRN: [id number]  Encounter Provider: Mark Maxwell DO  Encounter Date: 2024   Encounter department: [facility]  :  Assessment & Plan  Lumbar radiculopathy  Reviewed patient symptoms today.  Some, symptoms appear possibly secondary to sciatic nerve irritation.  He was educated about the generalized PROM.  Will check x-ray to rule out gross abnormalities.  Will start treatment with methocarbamol as well as prednisone taper.  He does have oxycodone that he takes chronically for his cervical radicular pain.  If any symptoms should worsen, he is vies to call or follow-up.  Orders:    methocarbamol (ROBAXIN) 500 mg tablet; Take 1 tablet (500 mg total) by mouth 2 (two) times a day as needed for muscle spasms    XR spine lumbar minimum 4 views non injury; Future    predniSONE 10 mg tablet; Take 6 tablets By mouth  In the morning Qd.   Decrease by  By 1 tablet daily until completed.           History of Present Illness     Back Pain  This is a recurrent problem. Episode onset: 2 weeks ago. The problem is unchanged. The pain is present in the lumbar spine. The pain radiates to the right thigh, left thigh, right knee and left knee. The pain is moderate. The pain is Worse during the day. The symptoms are aggravated by bending and twisting. Pertinent negatives include no abdominal pain, bladder incontinence, bowel incontinence, chest pain, dysuria, fever, headaches, numbness or tingling. He has tried nothing for the symptoms.     Review of Systems   Constitutional:  Negative for activity change, chills, fatigue and fever.   HENT:  Negative for congestion, ear pain, sinus pressure and sore throat.    Eyes:  Negative for redness, itching and visual disturbance.   Respiratory:  Negative for cough and shortness of breath.    Cardiovascular:  Negative for chest pain and palpitations.   Gastrointestinal:  Negative for abdominal pain, bowel incontinence,  diarrhea and nausea.   Endocrine: Negative for cold intolerance and heat intolerance.   Genitourinary:  Negative for bladder incontinence, dysuria, flank pain and frequency.   Musculoskeletal:  Positive for back pain. Negative for arthralgias, gait problem and myalgias.   Skin:  Negative for color change.   Allergic/Immunologic: Negative for environmental allergies.   Neurological:  Negative for dizziness, tingling, numbness and headaches.   Psychiatric/Behavioral:  Negative for behavioral problems and sleep disturbance.      Medical History Reviewed by provider this encounter:     .  Current Outpatient Medications on File Prior to Visit   Medication Sig Dispense Refill    amLODIPine (NORVASC) 5 mg tablet Take 1 tablet (5 mg total) by mouth daily 100 tablet 1    diazepam (VALIUM) 5 mg tablet Take 1 tablet (5 mg total) by mouth daily as needed for anxiety 60 tablet 0    famotidine (PEPCID) 20 mg tablet Take 1 tablet (20 mg total) by mouth daily 90 tablet 1    levothyroxine 150 mcg tablet Take 1 tablet (150 mcg total) by mouth daily 180 tablet 0    lisinopril (ZESTRIL) 20 mg tablet Take 1 tablet (20 mg total) by mouth daily 90 tablet 1    ondansetron (Zofran ODT) 4 mg disintegrating tablet Take 1 tablet (4 mg total) by mouth every 6 (six) hours as needed for nausea or vomiting 20 tablet 0    oxyCODONE (ROXICODONE) 10 MG TABS Take 1 tablet (10 mg total) by mouth every 8 (eight) hours as needed for moderate pain Max Daily Amount: 30 mg 60 tablet 0    Lidocaine Viscous HCl (XYLOCAINE) 2 % mucosal solution Swish and spit 15 mL 4 (four) times a day as needed for mouth pain or discomfort (Patient not taking: Reported on 7/11/2024) 100 mL 0    naloxone (NARCAN) 4 mg/0.1 mL nasal spray Administer 1 spray into a nostril. If breathing does not return to normal or if breathing difficulty resumes after 2-3 minutes, give another dose in the other nostril using a new spray. (Patient not taking: Reported on 7/11/2024) 1 each 0     "naloxone (NARCAN) 4 mg/0.1 mL nasal spray Administer 1 spray into a nostril. If no response after 2-3 minutes, give another dose in the other nostril using a new spray. (Patient not taking: Reported on 11/22/2024) 1 each 1     No current facility-administered medications on file prior to visit.      Social History     Tobacco Use    Smoking status: Never    Smokeless tobacco: Never   Vaping Use    Vaping status: Never Used   Substance and Sexual Activity    Alcohol use: Yes     Comment: socially    Drug use: No    Sexual activity: Not on file     Comment: high risk sexual behavior        Objective   /80 (BP Location: Left arm, Patient Position: Sitting, Cuff Size: Large)   Pulse 83   Temp 98.2 °F (36.8 °C) (Temporal)   Ht 5' 10\" (1.778 m)   Wt 107 kg (235 lb)   SpO2 98%   BMI 33.72 kg/m²      Physical Exam  Vitals reviewed.   Constitutional:       Appearance: Normal appearance. He is well-developed.   HENT:      Head: Normocephalic and atraumatic.      Right Ear: Hearing normal.      Left Ear: Hearing normal.      Nose: Nose normal. No septal deviation.   Eyes:      General: Lids are normal.      Pupils: Pupils are equal, round, and reactive to light.   Neck:      Thyroid: No thyroid mass or thyromegaly.      Trachea: Trachea normal.   Cardiovascular:      Rate and Rhythm: Normal rate.   Pulmonary:      Effort: Pulmonary effort is normal. No respiratory distress.      Breath sounds: No decreased breath sounds.   Abdominal:      Tenderness: There is no guarding.   Musculoskeletal:      Cervical back: Normal range of motion.      Lumbar back: Tenderness present. Decreased range of motion. Positive right straight leg raise test and positive left straight leg raise test.   Skin:     General: Skin is warm and dry.   Neurological:      Mental Status: He is alert and oriented to person, place, and time.      Cranial Nerves: No cranial nerve deficit.      Sensory: No sensory deficit.   Psychiatric:         " Speech: Speech normal.         Behavior: Behavior normal.         Thought Content: Thought content normal.         Judgment: Judgment normal.

## 2024-12-02 DIAGNOSIS — M54.16 LUMBAR RADICULOPATHY: ICD-10-CM

## 2024-12-03 RX ORDER — METHOCARBAMOL 500 MG/1
500 TABLET, FILM COATED ORAL 2 TIMES DAILY PRN
Qty: 40 TABLET | Refills: 0 | Status: SHIPPED | OUTPATIENT
Start: 2024-12-03

## 2024-12-09 DIAGNOSIS — E03.9 HYPOTHYROIDISM, UNSPECIFIED TYPE: ICD-10-CM

## 2024-12-10 ENCOUNTER — NURSE TRIAGE (OUTPATIENT)
Age: 59
End: 2024-12-10

## 2024-12-10 RX ORDER — LEVOTHYROXINE SODIUM 150 UG/1
150 TABLET ORAL DAILY
Qty: 90 TABLET | Refills: 1 | Status: SHIPPED | OUTPATIENT
Start: 2024-12-10 | End: 2024-12-11 | Stop reason: SDUPTHER

## 2024-12-10 NOTE — TELEPHONE ENCOUNTER
Regarding: Medication Problem  ----- Message from Jayesh STREETER sent at 12/10/2024  4:37 PM EST -----  Patient called and stated his Levothyroxine prescription had been called into the wrong pharmacy. Patient is asking if this medication can be rerouted to the [facility] in [place]. Patient would like this to be his preferred pharmacy.

## 2024-12-11 DIAGNOSIS — E03.9 HYPOTHYROIDISM, UNSPECIFIED TYPE: ICD-10-CM

## 2024-12-11 RX ORDER — LEVOTHYROXINE SODIUM 150 UG/1
150 TABLET ORAL DAILY
Qty: 90 TABLET | Refills: 1 | Status: SHIPPED | OUTPATIENT
Start: 2024-12-11 | End: 2025-06-09

## 2024-12-19 DIAGNOSIS — M54.12 CERVICAL RADICULOPATHY: ICD-10-CM

## 2024-12-20 RX ORDER — OXYCODONE HYDROCHLORIDE 10 MG/1
10 TABLET ORAL EVERY 8 HOURS PRN
Qty: 60 TABLET | Refills: 0 | Status: SHIPPED | OUTPATIENT
Start: 2024-12-20

## 2024-12-23 DIAGNOSIS — M54.16 LUMBAR RADICULOPATHY: ICD-10-CM

## 2024-12-23 RX ORDER — METHOCARBAMOL 500 MG/1
500 TABLET, FILM COATED ORAL 2 TIMES DAILY PRN
Qty: 40 TABLET | Refills: 0 | Status: SHIPPED | OUTPATIENT
Start: 2024-12-23

## 2025-01-08 ENCOUNTER — APPOINTMENT (OUTPATIENT)
Dept: LAB | Facility: CLINIC | Age: 60
End: 2025-01-08
Payer: COMMERCIAL

## 2025-01-08 DIAGNOSIS — E78.2 MIXED HYPERLIPIDEMIA: ICD-10-CM

## 2025-01-08 DIAGNOSIS — Z13.1 SCREENING FOR DIABETES MELLITUS: ICD-10-CM

## 2025-01-08 DIAGNOSIS — R25.2 MUSCLE CRAMPING: ICD-10-CM

## 2025-01-08 DIAGNOSIS — E03.9 HYPOTHYROIDISM, UNSPECIFIED TYPE: ICD-10-CM

## 2025-01-08 DIAGNOSIS — Z13.0 SCREENING FOR IRON DEFICIENCY ANEMIA: ICD-10-CM

## 2025-01-08 LAB
ALBUMIN SERPL BCG-MCNC: 4.5 G/DL (ref 3.5–5)
ALP SERPL-CCNC: 68 U/L (ref 34–104)
ALT SERPL W P-5'-P-CCNC: 78 U/L (ref 7–52)
ANION GAP SERPL CALCULATED.3IONS-SCNC: 12 MMOL/L (ref 4–13)
AST SERPL W P-5'-P-CCNC: 69 U/L (ref 13–39)
BILIRUB SERPL-MCNC: 1.53 MG/DL (ref 0.2–1)
BUN SERPL-MCNC: 13 MG/DL (ref 5–25)
CALCIUM SERPL-MCNC: 10.2 MG/DL (ref 8.4–10.2)
CHLORIDE SERPL-SCNC: 100 MMOL/L (ref 96–108)
CHOLEST SERPL-MCNC: 340 MG/DL (ref ?–200)
CO2 SERPL-SCNC: 29 MMOL/L (ref 21–32)
CREAT SERPL-MCNC: 0.82 MG/DL (ref 0.6–1.3)
ERYTHROCYTE [DISTWIDTH] IN BLOOD BY AUTOMATED COUNT: 12.9 % (ref 11.6–15.1)
EST. AVERAGE GLUCOSE BLD GHB EST-MCNC: 105 MG/DL
GFR SERPL CREATININE-BSD FRML MDRD: 95 ML/MIN/1.73SQ M
GLUCOSE P FAST SERPL-MCNC: 113 MG/DL (ref 65–99)
HBA1C MFR BLD: 5.3 %
HCT VFR BLD AUTO: 42 % (ref 36.5–49.3)
HDLC SERPL-MCNC: 65 MG/DL
HGB BLD-MCNC: 14.5 G/DL (ref 12–17)
LDLC SERPL CALC-MCNC: 237 MG/DL (ref 0–100)
MAGNESIUM SERPL-MCNC: 2.3 MG/DL (ref 1.9–2.7)
MCH RBC QN AUTO: 34.1 PG (ref 26.8–34.3)
MCHC RBC AUTO-ENTMCNC: 34.5 G/DL (ref 31.4–37.4)
MCV RBC AUTO: 99 FL (ref 82–98)
PLATELET # BLD AUTO: 236 THOUSANDS/UL (ref 149–390)
PMV BLD AUTO: 10.3 FL (ref 8.9–12.7)
POTASSIUM SERPL-SCNC: 4.5 MMOL/L (ref 3.5–5.3)
PROT SERPL-MCNC: 7.5 G/DL (ref 6.4–8.4)
RBC # BLD AUTO: 4.25 MILLION/UL (ref 3.88–5.62)
SODIUM SERPL-SCNC: 141 MMOL/L (ref 135–147)
T4 FREE SERPL-MCNC: 1.02 NG/DL (ref 0.61–1.12)
TRIGL SERPL-MCNC: 190 MG/DL (ref ?–150)
TSH SERPL DL<=0.05 MIU/L-ACNC: 5.35 UIU/ML (ref 0.45–4.5)
WBC # BLD AUTO: 7.71 THOUSAND/UL (ref 4.31–10.16)

## 2025-01-08 PROCEDURE — 83735 ASSAY OF MAGNESIUM: CPT

## 2025-01-08 PROCEDURE — 84439 ASSAY OF FREE THYROXINE: CPT

## 2025-01-08 PROCEDURE — 83036 HEMOGLOBIN GLYCOSYLATED A1C: CPT

## 2025-01-08 PROCEDURE — 80061 LIPID PANEL: CPT

## 2025-01-08 PROCEDURE — 84443 ASSAY THYROID STIM HORMONE: CPT

## 2025-01-08 PROCEDURE — 36415 COLL VENOUS BLD VENIPUNCTURE: CPT

## 2025-01-08 PROCEDURE — 80053 COMPREHEN METABOLIC PANEL: CPT

## 2025-01-08 PROCEDURE — 85027 COMPLETE CBC AUTOMATED: CPT

## 2025-01-14 ENCOUNTER — OFFICE VISIT (OUTPATIENT)
Dept: FAMILY MEDICINE CLINIC | Facility: CLINIC | Age: 60
End: 2025-01-14
Payer: COMMERCIAL

## 2025-01-14 VITALS
BODY MASS INDEX: 33.07 KG/M2 | HEIGHT: 70 IN | TEMPERATURE: 97.7 F | WEIGHT: 231 LBS | SYSTOLIC BLOOD PRESSURE: 130 MMHG | HEART RATE: 86 BPM | DIASTOLIC BLOOD PRESSURE: 76 MMHG | OXYGEN SATURATION: 97 %

## 2025-01-14 DIAGNOSIS — R73.03 PREDIABETES: ICD-10-CM

## 2025-01-14 DIAGNOSIS — Z13.0 SCREENING FOR IRON DEFICIENCY ANEMIA: ICD-10-CM

## 2025-01-14 DIAGNOSIS — E03.9 HYPOTHYROIDISM, UNSPECIFIED TYPE: ICD-10-CM

## 2025-01-14 DIAGNOSIS — K29.50 CHRONIC GASTRITIS WITHOUT BLEEDING, UNSPECIFIED GASTRITIS TYPE: ICD-10-CM

## 2025-01-14 DIAGNOSIS — I16.0 HYPERTENSIVE URGENCY: Primary | ICD-10-CM

## 2025-01-14 DIAGNOSIS — H53.9 VISION CHANGES: ICD-10-CM

## 2025-01-14 DIAGNOSIS — E78.2 MIXED HYPERLIPIDEMIA: ICD-10-CM

## 2025-01-14 DIAGNOSIS — M54.12 CERVICAL RADICULOPATHY: ICD-10-CM

## 2025-01-14 DIAGNOSIS — R94.6 ABNORMAL THYROID FUNCTION TEST: ICD-10-CM

## 2025-01-14 DIAGNOSIS — F40.243 FEAR OF FLYING: ICD-10-CM

## 2025-01-14 DIAGNOSIS — M54.16 LUMBAR RADICULOPATHY: ICD-10-CM

## 2025-01-14 PROCEDURE — 99214 OFFICE O/P EST MOD 30 MIN: CPT | Performed by: FAMILY MEDICINE

## 2025-01-14 RX ORDER — OXYCODONE HYDROCHLORIDE 10 MG/1
10 TABLET ORAL EVERY 8 HOURS PRN
Qty: 60 TABLET | Refills: 0 | Status: SHIPPED | OUTPATIENT
Start: 2025-01-14

## 2025-01-14 NOTE — ASSESSMENT & PLAN NOTE
Lipid panel appears significantly elevated.  Patient has had significant limitations in cholesterol for many years.  He was educated on the risk associated with this.  He was offered testing with a CT coronary calcium score however he did not want to pursue this today.  He did refuse statin treatment as well.  He did recently start a strict diet.  Will recheck his lipid panel again in 6 months.  Will consider pursuing CT coronary calcium score if lipid panel is not improving.  Orders:    Comprehensive metabolic panel; Future    Lipid Panel with Direct LDL reflex; Future

## 2025-01-14 NOTE — PROGRESS NOTES
Name: Sonia Fierro      : 1965      MRN: [id number]  Encounter Provider: Mark Maxwell DO  Encounter Date: 2025   Encounter department: [facility]  :  Assessment & Plan  Hypertensive urgency  Blood pressure appears very well-controlled today.  Continue with routine home monitoring as well as current treat with amlodipine as well as lisinopril.       Mixed hyperlipidemia  Lipid panel appears significantly elevated.  Patient has had significant limitations in cholesterol for many years.  He was educated on the risk associated with this.  He was offered testing with a CT coronary calcium score however he did not want to pursue this today.  He did refuse statin treatment as well.  He did recently start a strict diet.  Will recheck his lipid panel again in 6 months.  Will consider pursuing CT coronary calcium score if lipid panel is not improving.  Orders:    Comprehensive metabolic panel; Future    Lipid Panel with Direct LDL reflex; Future    Lumbar radiculopathy  Stable.  At some, continue with his current treatment of oxycodone.  PDMP reviewed, no abnormality seen today.       Chronic gastritis without bleeding, unspecified gastritis type  Stable.  Its time, continue with dietary trigger avoidance as well as current treatment with famotidine       Hypothyroidism, unspecified type  Stable.  At this time, continue with his current treatment of levothyroxine.  Orders:    TSH, 3rd generation with Free T4 reflex; Future    Fear of flying  Patient has been using  diazepam as needed for symptom relief.  Continue with his current treatment.  PDMP reviewed, no abnormality seen today.       Cervical radiculopathy                History of Present Illness     HPI  Patient is a 59-year-old male presents today for a follow-up on his chronic conditions.  He has hypertension, dyslipidemia, lumbar radiculopathy, chronic gastritis, hypothyroidism as well as a fear flying.  Has been taking his  "medications regularly.  He denies adverse reactions with his medications.  Review of Systems   Constitutional:  Negative for activity change, chills, fatigue and fever.   HENT:  Negative for congestion, ear pain, sinus pressure and sore throat.    Eyes:  Negative for redness, itching and visual disturbance.   Respiratory:  Negative for cough and shortness of breath.    Cardiovascular:  Negative for chest pain and palpitations.   Gastrointestinal:  Negative for abdominal pain, diarrhea and nausea.   Endocrine: Negative for cold intolerance and heat intolerance.   Genitourinary:  Negative for dysuria, flank pain and frequency.   Musculoskeletal:  Negative for arthralgias, back pain, gait problem and myalgias.   Skin:  Negative for color change.   Allergic/Immunologic: Negative for environmental allergies.   Neurological:  Negative for dizziness, numbness and headaches.   Psychiatric/Behavioral:  Negative for behavioral problems and sleep disturbance.        Objective   /76 (BP Location: Left arm, Patient Position: Sitting, Cuff Size: Large)   Pulse 86   Temp 97.7 °F (36.5 °C) (Temporal)   Ht 5' 10\" (1.778 m)   Wt 105 kg (231 lb)   SpO2 97%   BMI 33.15 kg/m²      Physical Exam  Vitals reviewed.   Constitutional:       General: He is not in acute distress.     Appearance: Normal appearance. He is well-developed.   HENT:      Head: Normocephalic and atraumatic.      Right Ear: Tympanic membrane, ear canal and external ear normal. There is no impacted cerumen.      Left Ear: Tympanic membrane, ear canal and external ear normal. There is no impacted cerumen.      Nose: Nose normal. No congestion or rhinorrhea.      Mouth/Throat:      Mouth: Mucous membranes are moist.      Pharynx: No oropharyngeal exudate or posterior oropharyngeal erythema.   Eyes:      General: No scleral icterus.        Right eye: No discharge.         Left eye: No discharge.      Extraocular Movements: Extraocular movements intact.      " Conjunctiva/sclera: Conjunctivae normal.      Pupils: Pupils are equal, round, and reactive to light.   Neck:      Trachea: No tracheal deviation.   Cardiovascular:      Rate and Rhythm: Normal rate and regular rhythm.      Pulses: Normal pulses.           Dorsalis pedis pulses are 2+ on the right side and 2+ on the left side.        Posterior tibial pulses are 2+ on the right side and 2+ on the left side.      Heart sounds: Normal heart sounds. No murmur heard.     No friction rub. No gallop.   Pulmonary:      Effort: Pulmonary effort is normal. No respiratory distress.      Breath sounds: Normal breath sounds. No wheezing, rhonchi or rales.   Abdominal:      General: Bowel sounds are normal. There is no distension.      Palpations: Abdomen is soft.      Tenderness: There is no abdominal tenderness. There is no guarding or rebound.   Musculoskeletal:         General: Normal range of motion.      Cervical back: Normal range of motion and neck supple.      Right lower leg: No edema.      Left lower leg: No edema.   Lymphadenopathy:      Head:      Right side of head: No submental or submandibular adenopathy.      Left side of head: No submental or submandibular adenopathy.      Cervical: No cervical adenopathy.      Right cervical: No superficial, deep or posterior cervical adenopathy.     Left cervical: No superficial, deep or posterior cervical adenopathy.   Skin:     General: Skin is warm and dry.      Findings: No erythema.   Neurological:      General: No focal deficit present.      Mental Status: He is alert and oriented to person, place, and time.      Cranial Nerves: No cranial nerve deficit.      Sensory: Sensation is intact. No sensory deficit.      Motor: Motor function is intact.   Psychiatric:         Attention and Perception: Attention and perception normal.         Mood and Affect: Mood is not anxious or depressed.         Speech: Speech normal.         Behavior: Behavior normal.         Thought  Content: Thought content normal.         Judgment: Judgment normal.

## 2025-01-14 NOTE — ASSESSMENT & PLAN NOTE
Patient has been using  diazepam as needed for symptom relief.  Continue with his current treatment.  PDMP reviewed, no abnormality seen today.

## 2025-01-14 NOTE — ASSESSMENT & PLAN NOTE
Stable.  At this time, continue with his current treatment of levothyroxine.  Orders:    TSH, 3rd generation with Free T4 reflex; Future

## 2025-01-15 DIAGNOSIS — I16.0 HYPERTENSIVE URGENCY: ICD-10-CM

## 2025-01-16 RX ORDER — AMLODIPINE BESYLATE 5 MG/1
5 TABLET ORAL DAILY
Qty: 100 TABLET | Refills: 1 | Status: SHIPPED | OUTPATIENT
Start: 2025-01-16

## 2025-01-28 DIAGNOSIS — R11.2 NAUSEA AND VOMITING: ICD-10-CM

## 2025-01-28 DIAGNOSIS — K29.70 GASTRITIS: ICD-10-CM

## 2025-01-29 RX ORDER — FAMOTIDINE 20 MG/1
20 TABLET, FILM COATED ORAL DAILY
Qty: 90 TABLET | Refills: 1 | Status: SHIPPED | OUTPATIENT
Start: 2025-01-29

## 2025-02-03 DIAGNOSIS — M54.12 CERVICAL RADICULOPATHY: ICD-10-CM

## 2025-02-03 DIAGNOSIS — F40.243 FEAR OF FLYING: ICD-10-CM

## 2025-02-04 RX ORDER — OXYCODONE HYDROCHLORIDE 10 MG/1
10 TABLET ORAL EVERY 8 HOURS PRN
Qty: 60 TABLET | Refills: 0 | Status: SHIPPED | OUTPATIENT
Start: 2025-02-04

## 2025-02-04 RX ORDER — DIAZEPAM 5 MG/1
5 TABLET ORAL DAILY PRN
Qty: 60 TABLET | Refills: 0 | Status: SHIPPED | OUTPATIENT
Start: 2025-02-04

## 2025-02-25 DIAGNOSIS — M54.12 CERVICAL RADICULOPATHY: ICD-10-CM

## 2025-02-25 RX ORDER — OXYCODONE HYDROCHLORIDE 10 MG/1
10 TABLET ORAL EVERY 8 HOURS PRN
Qty: 60 TABLET | Refills: 0 | Status: SHIPPED | OUTPATIENT
Start: 2025-02-25 | End: 2025-03-05 | Stop reason: SDUPTHER

## 2025-02-25 NOTE — TELEPHONE ENCOUNTER
Reason for call:   [x] Refill   [] Prior Auth  [] Other:     Office:   [x] PCP/Provider - Mark Maxwell   [] Specialty/Provider -     Medication: oxyCODONE (ROXICODONE) 10 MG TABS     Dose/Frequency: Take 1 tablet (10 mg total) by mouth every 8 (eight) hours as needed     Quantity: 60    Pharmacy: [facility]    Does the patient have enough for 3 days?   [] Yes   [x] No - Send as HP to POD

## 2025-02-26 ENCOUNTER — TELEPHONE (OUTPATIENT)
Dept: FAMILY MEDICINE CLINIC | Facility: CLINIC | Age: 60
End: 2025-02-26

## 2025-02-27 NOTE — TELEPHONE ENCOUNTER
PA for oxyCODONE 10MG TABS SUBMITTED to [facility]    via    []CMM-KEY:   []Surescripts-Case ID #   []Availity-Auth ID # NDC #   []Faxed to plan   [x]Other website-PromptPA: [id number]   []Phone call Case ID #     [x]PA sent as URGENT    All office notes, labs and other pertaining documents and studies sent. Clinical questions answered. Awaiting determination from insurance company.     Turnaround time for your insurance to make a decision on your Prior Authorization can take 7-21 business days.

## 2025-02-28 NOTE — TELEPHONE ENCOUNTER
PA for oxyCODONE 10MG TABS DENIED    Reason:      Message sent to office clinical pool Yes    Denial letter scanned into Media Yes    Appeal started No     **Please follow up with your patient regarding denial and next steps**

## 2025-03-02 DIAGNOSIS — I16.0 HYPERTENSIVE URGENCY: ICD-10-CM

## 2025-03-03 RX ORDER — LISINOPRIL 20 MG/1
20 TABLET ORAL DAILY
Qty: 90 TABLET | Refills: 1 | Status: SHIPPED | OUTPATIENT
Start: 2025-03-03

## 2025-03-03 NOTE — TELEPHONE ENCOUNTER
Faxed ofc notesfor reconsideration to   [phone] [facility] Complaint/Grievance Dept

## 2025-03-04 RX ORDER — LISINOPRIL 20 MG/1
20 TABLET ORAL DAILY
Qty: 90 TABLET | Refills: 0 | OUTPATIENT
Start: 2025-03-04

## 2025-03-05 DIAGNOSIS — M54.12 CERVICAL RADICULOPATHY: ICD-10-CM

## 2025-03-05 NOTE — TELEPHONE ENCOUNTER
Reason for call:   [x] Refill   [] Prior Auth  [x] Other: Pharmacy Change Pt wants to use good Rx and [facility] will accept it     Office:   [x] PCP/Provider -   [] Specialty/Provider -     Medication:     oxyCODONE (ROXICODONE) 10 MG Take 1 tablet (10 mg total) by mouth every 8 (eight) hours as needed for moderate pain          Pharmacy: [facility]     Local Pharmacy   Does the patient have enough for 3 days?   [] Yes   [x] No - Send as HP to POD    Mail Away Pharmacy   Does the patient have enough for 10 days?   [] Yes   [] No - Send as HP to POD

## 2025-03-06 RX ORDER — OXYCODONE HYDROCHLORIDE 10 MG/1
10 TABLET ORAL EVERY 8 HOURS PRN
Qty: 60 TABLET | Refills: 0 | Status: SHIPPED | OUTPATIENT
Start: 2025-03-06

## 2025-03-27 DIAGNOSIS — M54.12 CERVICAL RADICULOPATHY: ICD-10-CM

## 2025-03-27 RX ORDER — OXYCODONE HYDROCHLORIDE 10 MG/1
10 TABLET ORAL EVERY 8 HOURS PRN
Qty: 60 TABLET | Refills: 0 | Status: SHIPPED | OUTPATIENT
Start: 2025-03-27

## 2025-03-27 NOTE — TELEPHONE ENCOUNTER
Reason for call:   [x] Refill   [] Prior Auth  [] Other:     Office:   [x] PCP/Provider -   [] Specialty/Provider -     Medication:     oxyCODONE (ROXICODONE) 10 MG Take 1 tablet (10 mg total) by mouth every 8 (eight) hours as needed for moderate pain        Pharmacy:  [id number] - [place], [place]     Local Pharmacy   Does the patient have enough for 3 days?   [x] Yes   [] No - Send as HP to POD    Mail Away Pharmacy   Does the patient have enough for 10 days?   [] Yes   [] No - Send as HP to POD

## 2025-04-17 DIAGNOSIS — F40.243 FEAR OF FLYING: ICD-10-CM

## 2025-04-17 NOTE — TELEPHONE ENCOUNTER
Reason for call:   [x] Refill   [] Prior Auth  [] Other:     Office:   [x] PCP/Provider - NATALIE [facility]OSCO   [] Specialty/Provider -     Medication: diazepam (VALIUM) 5 mg tab     Dose/Frequency: 5 mg, Daily PRN     Quantity: 60    Pharmacy: [id number]    Local Pharmacy   Does the patient have enough for 3 days?   [x] Yes   [] No - Send as HP to POD    Mail Away Pharmacy   Does the patient have enough for 10 days?   [] Yes   [] No - Send as HP to POD

## 2025-04-18 RX ORDER — DIAZEPAM 5 MG/1
5 TABLET ORAL DAILY PRN
Qty: 60 TABLET | Refills: 0 | Status: SHIPPED | OUTPATIENT
Start: 2025-04-18

## 2025-04-21 DIAGNOSIS — M54.12 CERVICAL RADICULOPATHY: ICD-10-CM

## 2025-04-21 RX ORDER — OXYCODONE HYDROCHLORIDE 10 MG/1
10 TABLET ORAL EVERY 8 HOURS PRN
Qty: 60 TABLET | Refills: 0 | Status: SHIPPED | OUTPATIENT
Start: 2025-04-21

## 2025-04-21 NOTE — TELEPHONE ENCOUNTER
Reason for call:   [x] Refill   [] Prior Auth  [] Other:     Office:   [x] PCP/Provider - Dr Maxwell  [] Specialty/Provider -     Medication: oxycodone     Dose/Frequency: 10 mg take 1 tablet every 8 hours as needed     Quantity: 60    Pharmacy: [facility] [address] [facility]   Does the patient have enough for 3 days?   [x] Yes   [] No - Send as HP to POD    Mail Away Pharmacy   Does the patient have enough for 10 days?   [] Yes   [] No - Send as HP to POD

## 2025-05-13 DIAGNOSIS — M54.12 CERVICAL RADICULOPATHY: ICD-10-CM

## 2025-05-13 RX ORDER — OXYCODONE HYDROCHLORIDE 10 MG/1
10 TABLET ORAL EVERY 8 HOURS PRN
Qty: 60 TABLET | Refills: 0 | Status: SHIPPED | OUTPATIENT
Start: 2025-05-13

## 2025-05-27 DIAGNOSIS — F40.243 FEAR OF FLYING: ICD-10-CM

## 2025-05-28 RX ORDER — DIAZEPAM 5 MG/1
5 TABLET ORAL DAILY PRN
Qty: 60 TABLET | Refills: 0 | Status: SHIPPED | OUTPATIENT
Start: 2025-05-28

## 2025-05-30 DIAGNOSIS — I16.0 HYPERTENSIVE URGENCY: ICD-10-CM

## 2025-05-30 NOTE — TELEPHONE ENCOUNTER
Reason for call:   [x] Refill   [] Prior Auth  [] Other:     Office:   [x] PCP/Provider - Mark Maxwell,    [] Specialty/Provider -     Medication: lisinopril (ZESTRIL) 20 mg tablet     Dose/Frequency:  Take 1 tablet (20 mg total) by mouth daily     Quantity: 90    Pharmacy: [address] [facility] - [address] [place] [address]     Local Pharmacy   Does the patient have enough for 3 days?   [x] Yes   [] No - Send as HP to POD    Mail Away Pharmacy   Does the patient have enough for 10 days?   [] Yes   [] No - Send as HP to POD

## 2025-05-31 RX ORDER — LISINOPRIL 20 MG/1
20 TABLET ORAL DAILY
Qty: 90 TABLET | Refills: 1 | Status: SHIPPED | OUTPATIENT
Start: 2025-05-31

## 2025-06-09 DIAGNOSIS — M54.12 CERVICAL RADICULOPATHY: ICD-10-CM

## 2025-06-09 DIAGNOSIS — E03.9 HYPOTHYROIDISM, UNSPECIFIED TYPE: ICD-10-CM

## 2025-06-09 RX ORDER — LEVOTHYROXINE SODIUM 150 UG/1
150 TABLET ORAL DAILY
Qty: 90 TABLET | Refills: 0 | Status: SHIPPED | OUTPATIENT
Start: 2025-06-09 | End: 2025-12-06

## 2025-06-09 NOTE — TELEPHONE ENCOUNTER
Reason for call:   [x] Refill   [] Prior Auth  [] Other:     Office:   [x] PCP/Provider - [facility]  Authorized By: Mark Maxwell DO  [] Specialty/Provider -     levothyroxine 150 mcg tablet Take 1 tablet (150 mcg total) by mouth daily - [facility] - [facility] - [address] [facility]     oxyCODONE (ROXICODONE) 10 MG TABS Take 1 tablet (10 mg total) by mouth every 8 (eight) hours as needed for moderate pain [id number] - [address] [facility]   Does the patient have enough for 3 days?   [x] Yes   [] No - Send as HP to POD    Mail Away Pharmacy   Does the patient have enough for 10 days?   [] Yes   [] No - Send as HP to POD

## 2025-06-10 NOTE — TELEPHONE ENCOUNTER
Patient called to request a refill for their oxycodone advised a refill was requested on 6-9-25 and is pending approval. Patient verbalized understanding and is in agreement.     Does the patient have enough for 3 days?   [] Yes   [x] No - Send as HP to POD

## 2025-06-11 RX ORDER — OXYCODONE HYDROCHLORIDE 10 MG/1
10 TABLET ORAL EVERY 8 HOURS PRN
Qty: 60 TABLET | Refills: 0 | Status: SHIPPED | OUTPATIENT
Start: 2025-06-11

## 2025-06-13 ENCOUNTER — TELEPHONE (OUTPATIENT)
Age: 60
End: 2025-06-13

## 2025-06-13 NOTE — TELEPHONE ENCOUNTER
Patient called, asked if there was active lab orders in his chart, that needed to be prior to to his next visit.  Confirmed there are active lab orders, 10-12 hours or longer fasting  Dated 1/14/2025.  Pt will have done prior to next appointment 7/14  Patient thanked us for the information.  Noted for visibility.

## 2025-07-07 ENCOUNTER — APPOINTMENT (OUTPATIENT)
Dept: LAB | Facility: CLINIC | Age: 60
End: 2025-07-07
Payer: COMMERCIAL

## 2025-07-07 DIAGNOSIS — M54.12 CERVICAL RADICULOPATHY: ICD-10-CM

## 2025-07-07 DIAGNOSIS — E03.9 HYPOTHYROIDISM, UNSPECIFIED TYPE: ICD-10-CM

## 2025-07-07 DIAGNOSIS — R73.03 PREDIABETES: ICD-10-CM

## 2025-07-07 DIAGNOSIS — Z13.0 SCREENING FOR IRON DEFICIENCY ANEMIA: ICD-10-CM

## 2025-07-07 DIAGNOSIS — E78.2 MIXED HYPERLIPIDEMIA: ICD-10-CM

## 2025-07-07 LAB
ALBUMIN SERPL BCG-MCNC: 4.4 G/DL (ref 3.5–5)
ALP SERPL-CCNC: 84 U/L (ref 34–104)
ALT SERPL W P-5'-P-CCNC: 32 U/L (ref 7–52)
ANION GAP SERPL CALCULATED.3IONS-SCNC: 7 MMOL/L (ref 4–13)
AST SERPL W P-5'-P-CCNC: 26 U/L (ref 13–39)
BILIRUB SERPL-MCNC: 0.72 MG/DL (ref 0.2–1)
BUN SERPL-MCNC: 8 MG/DL (ref 5–25)
CALCIUM SERPL-MCNC: 9.5 MG/DL (ref 8.4–10.2)
CHLORIDE SERPL-SCNC: 102 MMOL/L (ref 96–108)
CHOLEST SERPL-MCNC: 216 MG/DL (ref ?–200)
CO2 SERPL-SCNC: 29 MMOL/L (ref 21–32)
CREAT SERPL-MCNC: 0.74 MG/DL (ref 0.6–1.3)
ERYTHROCYTE [DISTWIDTH] IN BLOOD BY AUTOMATED COUNT: 12.6 % (ref 11.6–15.1)
EST. AVERAGE GLUCOSE BLD GHB EST-MCNC: 111 MG/DL
GFR SERPL CREATININE-BSD FRML MDRD: 98 ML/MIN/1.73SQ M
GLUCOSE P FAST SERPL-MCNC: 114 MG/DL (ref 65–99)
HBA1C MFR BLD: 5.5 %
HCT VFR BLD AUTO: 41.8 % (ref 36.5–49.3)
HDLC SERPL-MCNC: 50 MG/DL
HGB BLD-MCNC: 14.2 G/DL (ref 12–17)
LDLC SERPL CALC-MCNC: 126 MG/DL (ref 0–100)
MCH RBC QN AUTO: 34.1 PG (ref 26.8–34.3)
MCHC RBC AUTO-ENTMCNC: 34 G/DL (ref 31.4–37.4)
MCV RBC AUTO: 101 FL (ref 82–98)
PLATELET # BLD AUTO: 270 THOUSANDS/UL (ref 149–390)
PMV BLD AUTO: 11.1 FL (ref 8.9–12.7)
POTASSIUM SERPL-SCNC: 4.1 MMOL/L (ref 3.5–5.3)
PROT SERPL-MCNC: 6.9 G/DL (ref 6.4–8.4)
RBC # BLD AUTO: 4.16 MILLION/UL (ref 3.88–5.62)
SODIUM SERPL-SCNC: 138 MMOL/L (ref 135–147)
TRIGL SERPL-MCNC: 198 MG/DL (ref ?–150)
TSH SERPL DL<=0.05 MIU/L-ACNC: 1.64 UIU/ML (ref 0.45–4.5)
WBC # BLD AUTO: 5.87 THOUSAND/UL (ref 4.31–10.16)

## 2025-07-07 PROCEDURE — 85027 COMPLETE CBC AUTOMATED: CPT

## 2025-07-07 PROCEDURE — 83036 HEMOGLOBIN GLYCOSYLATED A1C: CPT

## 2025-07-07 PROCEDURE — 36415 COLL VENOUS BLD VENIPUNCTURE: CPT

## 2025-07-07 PROCEDURE — 80061 LIPID PANEL: CPT

## 2025-07-07 PROCEDURE — 84443 ASSAY THYROID STIM HORMONE: CPT

## 2025-07-07 PROCEDURE — 80053 COMPREHEN METABOLIC PANEL: CPT

## 2025-07-07 NOTE — TELEPHONE ENCOUNTER
Reason for call:   [x] Refill   [] Prior Auth  [] Other:     Office:   [x] PCP/Provider - [facility]  Authorized By: Mark Maxwell DO    [] Specialty/Provider -     Medication: oxyCODONE (ROXICODONE) 10 MG TABS    Dose/Frequency: Take 1 tablet (10 mg total) by mouth every 8 (eight) hours as needed for moderate pain    Quantity: 60    Pharmacy: [id number] [address] [facility]     Local Pharmacy   Does the patient have enough for 3 days?   [x] Yes   [] No - Send as HP to POD    Mail Away Pharmacy   Does the patient have enough for 10 days?   [] Yes   [] No - Send as HP to POD

## 2025-07-08 RX ORDER — OXYCODONE HYDROCHLORIDE 10 MG/1
10 TABLET ORAL EVERY 8 HOURS PRN
Qty: 60 TABLET | Refills: 0 | Status: SHIPPED | OUTPATIENT
Start: 2025-07-08

## 2025-07-14 ENCOUNTER — OFFICE VISIT (OUTPATIENT)
Dept: FAMILY MEDICINE CLINIC | Facility: CLINIC | Age: 60
End: 2025-07-14
Payer: COMMERCIAL

## 2025-07-14 ENCOUNTER — TELEPHONE (OUTPATIENT)
Dept: FAMILY MEDICINE CLINIC | Facility: CLINIC | Age: 60
End: 2025-07-14

## 2025-07-14 VITALS
HEART RATE: 98 BPM | SYSTOLIC BLOOD PRESSURE: 128 MMHG | HEIGHT: 71 IN | BODY MASS INDEX: 32.37 KG/M2 | TEMPERATURE: 97.7 F | DIASTOLIC BLOOD PRESSURE: 76 MMHG | OXYGEN SATURATION: 97 % | WEIGHT: 231.2 LBS

## 2025-07-14 DIAGNOSIS — Z72.52 HIGH RISK HOMOSEXUAL BEHAVIOR: Primary | ICD-10-CM

## 2025-07-14 DIAGNOSIS — R11.2 NAUSEA AND VOMITING: ICD-10-CM

## 2025-07-14 DIAGNOSIS — K29.70 GASTRITIS: ICD-10-CM

## 2025-07-14 DIAGNOSIS — M54.12 CERVICAL RADICULOPATHY: ICD-10-CM

## 2025-07-14 DIAGNOSIS — E03.9 HYPOTHYROIDISM, UNSPECIFIED TYPE: ICD-10-CM

## 2025-07-14 DIAGNOSIS — E78.2 MIXED HYPERLIPIDEMIA: ICD-10-CM

## 2025-07-14 DIAGNOSIS — Z00.00 ANNUAL PHYSICAL EXAM: Primary | ICD-10-CM

## 2025-07-14 DIAGNOSIS — I16.0 HYPERTENSIVE URGENCY: ICD-10-CM

## 2025-07-14 DIAGNOSIS — F11.20 CONTINUOUS OPIOID DEPENDENCE (HCC): ICD-10-CM

## 2025-07-14 DIAGNOSIS — K29.50 CHRONIC GASTRITIS WITHOUT BLEEDING, UNSPECIFIED GASTRITIS TYPE: ICD-10-CM

## 2025-07-14 DIAGNOSIS — F40.243 FEAR OF FLYING: ICD-10-CM

## 2025-07-14 PROCEDURE — 99396 PREV VISIT EST AGE 40-64: CPT | Performed by: FAMILY MEDICINE

## 2025-07-14 PROCEDURE — 99214 OFFICE O/P EST MOD 30 MIN: CPT | Performed by: FAMILY MEDICINE

## 2025-07-14 RX ORDER — FAMOTIDINE 20 MG/1
20 TABLET, FILM COATED ORAL DAILY
Qty: 90 TABLET | Refills: 1 | Status: SHIPPED | OUTPATIENT
Start: 2025-07-14

## 2025-07-14 RX ORDER — AMLODIPINE BESYLATE 5 MG/1
5 TABLET ORAL DAILY
Qty: 100 TABLET | Refills: 1 | Status: SHIPPED | OUTPATIENT
Start: 2025-07-14

## 2025-07-14 NOTE — PROGRESS NOTES
Adult Annual Physical  Name: Sonia Fierro      : 1965      MRN: [id number]  Encounter Provider: Mark Maxwell DO  Encounter Date: 2025   Encounter department: [facility]    :  Assessment & Plan  Cervical radiculopathy         Hypertensive urgency         Mixed hyperlipidemia         Hypothyroidism, unspecified type         Fear of flying         Chronic gastritis without bleeding, unspecified gastritis type         Annual physical exam             Preventive Screenings:  - Diabetes Screening: screening up-to-date, risks/benefits discussed and orders placed  - Cholesterol Screening: screening not indicated, has hyperlipidemia, risks/benefits discussed and orders placed   - Hepatitis C screening: risks/benefits discussed   - HIV screening: screening up-to-date and risks/benefits discussed   - Colon cancer screening: screening up-to-date and risks/benefits discussed   - Lung cancer screening: screening not indicated and risks/benefits discussed   - Prostate cancer screening: risks/benefits discussed and orders placed     Immunizations:  - Immunizations due: Prevnar 20, Tdap and Zoster (Shingrix)         History of Present Illness     Adult Annual Physical:  Patient presents for annual physical.     Diet and Physical Activity:  - Diet/Nutrition: no special diet.  - Exercise: walking, 3-4 times a week on average, 1-2 hours on average and more than 2 hours on average.    General Health:  - Sleep: sleeps well and 7-8 hours of sleep on average.  - Hearing: normal hearing right ear and normal hearing left ear.  - Vision: vision problems.  - Dental: regular dental visits, brushes teeth once daily and floss regularly.     Health:    - Urinary symptoms: none.     Advanced Care Planning:  - Has an advanced directive?: yes    - Has a durable medical POA?: yes      Review of Systems   Constitutional:  Negative for activity change, chills, fatigue and fever.   HENT:  Negative for congestion,  "ear pain, sinus pressure and sore throat.    Eyes:  Negative for redness, itching and visual disturbance.   Respiratory:  Negative for cough and shortness of breath.    Cardiovascular:  Negative for chest pain and palpitations.   Gastrointestinal:  Negative for abdominal pain, diarrhea and nausea.   Endocrine: Negative for cold intolerance and heat intolerance.   Genitourinary:  Negative for dysuria, flank pain and frequency.   Musculoskeletal:  Negative for arthralgias, back pain, gait problem and myalgias.   Skin:  Negative for color change.   Allergic/Immunologic: Negative for environmental allergies.   Neurological:  Negative for dizziness, numbness and headaches.   Psychiatric/Behavioral:  Negative for behavioral problems and sleep disturbance.      Medical History Reviewed by provider this encounter:  Tobacco  Allergies  Meds  Problems  Med Hx  Surg Hx  Fam Hx     .  Medications Ordered Prior to Encounter[1]   Social History[2]    Objective   /76 (BP Location: Left arm, Patient Position: Sitting, Cuff Size: Adult)   Pulse 98   Temp 97.7 °F (36.5 °C)   Ht 5' 10.5\" (1.791 m)   Wt 105 kg (231 lb 3.2 oz)   SpO2 97%   BMI 32.70 kg/m²     Physical Exam  Vitals reviewed.   Constitutional:       General: He is not in acute distress.     Appearance: Normal appearance. He is well-developed.   HENT:      Head: Normocephalic and atraumatic.      Right Ear: Tympanic membrane, ear canal and external ear normal. There is no impacted cerumen.      Left Ear: Tympanic membrane, ear canal and external ear normal. There is no impacted cerumen.      Nose: Nose normal. No congestion or rhinorrhea.      Mouth/Throat:      Mouth: Mucous membranes are moist.      Pharynx: No oropharyngeal exudate or posterior oropharyngeal erythema.     Eyes:      General: No scleral icterus.        Right eye: No discharge.         Left eye: No discharge.      Extraocular Movements: Extraocular movements intact.      " Conjunctiva/sclera: Conjunctivae normal.      Pupils: Pupils are equal, round, and reactive to light.     Neck:      Trachea: No tracheal deviation.     Cardiovascular:      Rate and Rhythm: Normal rate and regular rhythm.      Pulses: Normal pulses.           Dorsalis pedis pulses are 2+ on the right side and 2+ on the left side.        Posterior tibial pulses are 2+ on the right side and 2+ on the left side.      Heart sounds: Normal heart sounds. No murmur heard.     No friction rub. No gallop.   Pulmonary:      Effort: Pulmonary effort is normal. No respiratory distress.      Breath sounds: Normal breath sounds. No wheezing, rhonchi or rales.   Abdominal:      General: Bowel sounds are normal. There is no distension.      Palpations: Abdomen is soft.      Tenderness: There is no abdominal tenderness. There is no guarding or rebound.     Musculoskeletal:         General: Normal range of motion.      Cervical back: Normal range of motion and neck supple.      Right lower leg: No edema.      Left lower leg: No edema.   Lymphadenopathy:      Head:      Right side of head: No submental or submandibular adenopathy.      Left side of head: No submental or submandibular adenopathy.      Cervical: No cervical adenopathy.      Right cervical: No superficial, deep or posterior cervical adenopathy.     Left cervical: No superficial, deep or posterior cervical adenopathy.     Skin:     General: Skin is warm and dry.      Findings: No erythema.     Neurological:      General: No focal deficit present.      Mental Status: He is alert and oriented to person, place, and time.      Cranial Nerves: No cranial nerve deficit.      Sensory: Sensation is intact. No sensory deficit.      Motor: Motor function is intact.     Psychiatric:         Attention and Perception: Attention and perception normal.         Mood and Affect: Mood is not anxious or depressed.         Speech: Speech normal.         Behavior: Behavior normal.          Thought Content: Thought content normal.         Judgment: Judgment normal.              [1]   Current Outpatient Medications on File Prior to Visit   Medication Sig Dispense Refill    amLODIPine (NORVASC) 5 mg tablet Take 1 tablet (5 mg total) by mouth daily 100 tablet 1    diazepam (VALIUM) 5 mg tablet Take 1 tablet (5 mg total) by mouth daily as needed for anxiety 60 tablet 0    famotidine (PEPCID) 20 mg tablet Take 1 tablet (20 mg total) by mouth daily 90 tablet 1    levothyroxine 150 mcg tablet Take 1 tablet (150 mcg total) by mouth daily 90 tablet 0    Lidocaine Viscous HCl (XYLOCAINE) 2 % mucosal solution Swish and spit 15 mL 4 (four) times a day as needed for mouth pain or discomfort (Patient not taking: Reported on 7/11/2024) 100 mL 0    lisinopril (ZESTRIL) 20 mg tablet Take 1 tablet (20 mg total) by mouth daily 90 tablet 1    methocarbamol (ROBAXIN) 500 mg tablet Take 1 tablet (500 mg total) by mouth 2 (two) times a day as needed for muscle spasms (Patient not taking: Reported on 1/14/2025) 40 tablet 0    naloxone (NARCAN) 4 mg/0.1 mL nasal spray Administer 1 spray into a nostril. If breathing does not return to normal or if breathing difficulty resumes after 2-3 minutes, give another dose in the other nostril using a new spray. (Patient not taking: Reported on 7/11/2024) 1 each 0    ondansetron (Zofran ODT) 4 mg disintegrating tablet Take 1 tablet (4 mg total) by mouth every 6 (six) hours as needed for nausea or vomiting 20 tablet 0    oxyCODONE (ROXICODONE) 10 MG TABS Take 1 tablet (10 mg total) by mouth every 8 (eight) hours as needed for moderate pain Max Daily Amount: 30 mg 60 tablet 0     No current facility-administered medications on file prior to visit.   [2]   Social History  Tobacco Use    Smoking status: Never    Smokeless tobacco: Never   Vaping Use    Vaping status: Never Used   Substance and Sexual Activity    Alcohol use: Yes     Comment: socially    Drug use: No

## 2025-07-14 NOTE — PATIENT INSTRUCTIONS
"Patient Education     Routine physical for adults   The Basics   Written by the doctors and editors at [facility]   What is a physical? -- A physical is a routine visit, or \"check-up,\" with your doctor. You might also hear it called a \"wellness visit\" or \"preventive visit.\"  During each visit, the doctor will:   Ask about your physical and mental health   Ask about your habits, behaviors, and lifestyle   Do an exam   Give you vaccines if needed   Talk to you about any medicines you take   Give advice about your health   Answer your questions  Getting regular check-ups is an important part of taking care of your health. It can help your doctor find and treat any problems you have. But it's also important for preventing health problems.  A routine physical is different from a \"sick visit.\" A sick visit is when you see a doctor because of a health concern or problem. Since physicals are scheduled ahead of time, you can think about what you want to ask the doctor.  How often should I get a physical? -- It depends on your age and health. In general, for people age 21 years and older:   If you are younger than 50 years, you might be able to get a physical every 3 years.   If you are 50 years or older, your doctor might recommend a physical every year.  If you have an ongoing health condition, like diabetes or high blood pressure, your doctor will probably want to see you more often.  What happens during a physical? -- In general, each visit will include:   Physical exam - The doctor or nurse will check your height, weight, heart rate, and blood pressure. They will also look at your eyes and ears. They will ask about how you are feeling and whether you have any symptoms that bother you.   Medicines - It's a good idea to bring a list of all the medicines you take to each doctor visit. Your doctor will talk to you about your medicines and answer any questions. Tell them if you are having any side effects that bother you. You " "should also tell them if you are having trouble paying for any of your medicines.   Habits and behaviors - This includes:   Your diet   Your exercise habits   Whether you smoke, drink alcohol, or use drugs   Whether you are sexually active   Whether you feel safe at home  Your doctor will talk to you about things you can do to improve your health and lower your risk of health problems. They will also offer help and support. For example, if you want to quit smoking, they can give you advice and might prescribe medicines. If you want to improve your diet or get more physical activity, they can help you with this, too.   Lab tests, if needed - The tests you get will depend on your age and situation. For example, your doctor might want to check your:   Cholesterol   Blood sugar   Iron level   Vaccines - The recommended vaccines will depend on your age, health, and what vaccines you already had. Vaccines are very important because they can prevent certain serious or deadly infections.   Discussion of screening - \"Screening\" means checking for diseases or other health problems before they cause symptoms. Your doctor can recommend screening based on your age, risk, and preferences. This might include tests to check for:   Cancer, such as breast, prostate, cervical, ovarian, colorectal, prostate, lung, or skin cancer   Sexually transmitted infections, such as chlamydia and gonorrhea   Mental health conditions like depression and anxiety  Your doctor will talk to you about the different types of screening tests. They can help you decide which screenings to have. They can also explain what the results might mean.   Answering questions - The physical is a good time to ask the doctor or nurse questions about your health. If needed, they can refer you to other doctors or specialists, too.  Adults older than 65 years often need other care, too. As you get older, your doctor will talk to you about:   How to prevent falling at " home   Hearing or vision tests   Memory testing   How to take your medicines safely   Making sure that you have the help and support you need at home  All topics are updated as new evidence becomes available and our peer review process is complete.  This topic retrieved from [employer] on: May 02, 2024.  Topic 175143 Version 1.0  Release: 32.4.3 - C32.122  © 2024 UpToDate, Inc. and/or its affiliates. All rights reserved.  Consumer Information Use and Disclaimer   Disclaimer: This generalized information is a limited summary of diagnosis, treatment, and/or medication information. It is not meant to be comprehensive and should be used as a tool to help the user understand and/or assess potential diagnostic and treatment options. It does NOT include all information about conditions, treatments, medications, side effects, or risks that may apply to a specific patient. It is not intended to be medical advice or a substitute for the medical advice, diagnosis, or treatment of a health care provider based on the health care provider's examination and assessment of a patient's specific and unique circumstances. Patients must speak with a health care provider for complete information about their health, medical questions, and treatment options, including any risks or benefits regarding use of medications. This information does not endorse any treatments or medications as safe, effective, or approved for treating a specific patient. UpToDate, Inc. and its affiliates disclaim any warranty or liability relating to this information or the use thereof.The use of this information is governed by the Terms of Use, available at https://www.[employer]ters[employer] Realityuwer.com/en/know/clinical-effectiveness-terms. 2024© UpToDate, Inc. and its affiliates and/or licensors. All rights reserved.  Copyright   © 2024 UpToDate, Inc. and/or its affiliates. All rights reserved.

## 2025-07-14 NOTE — PROGRESS NOTES
Name: Sonia Fierro      : 1965      MRN: [id number]  Encounter Provider: Mark Maxwell DO  Encounter Date: 2025   Encounter department: [facility]  :  Assessment & Plan  Cervical radiculopathy  Clinically stable today.  Urine drug screen completed today.  Continue with his current treatment of oxycodone.  PDMP reviewed, no abnormality seen today.  No abnormality seen today.  Orders:    [facility] Prescribed Meds and Special Instructions    Amphetamines, Methamphetamines    Butalbital    Phenobarbital    Secobarbital    Alprazolam    Clonazepam    Diazepam, Temazepam, Oxazepam    Lorazepam    Gabapentin    Pregabalin    Cocaine    Heroin    Buprenorphine    Levorphanol    Meperidine    Naltrexone    Fentanyl    Methadone    Oxycodone    Tapentadol    THC    Tramadol    Codeine, Hydrocodone, Hydropmorphone, Morphine    Bath Salts    Ethyl Glucuronide/Ethyl Sulfate    Kratom    Spice    Methylphenidate    Phentermine    Validity Oxidant    Validity Creatinine    Validity pH    Validity Specific    Xylazine Definitive Test    Hypertensive urgency  Blood pressure appears very well-controlled.  Continue with current treatment of amlodipine as well as lisinopril.  Orders:    amLODIPine (NORVASC) 5 mg tablet; Take 1 tablet (5 mg total) by mouth daily    Mixed hyperlipidemia  Recheck lipid panel.  Continue with a strict low-fat/low-cholesterol diet.       Hypothyroidism, unspecified type  Recheck blood work.  Continue with current dose of levothyroxine.       Fear of flying  Stable.  At some, continue with diazepam as needed.       Chronic gastritis without bleeding, unspecified gastritis type  Symptoms appear overall stable.  Continue with current treatment of famotidine.       Annual physical exam         Nausea and vomiting    Orders:    famotidine (PEPCID) 20 mg tablet; Take 1 tablet (20 mg total) by mouth daily    Gastritis    Orders:    famotidine (PEPCID) 20 mg tablet; Take 1  tablet (20 mg total) by mouth daily    Continuous opioid dependence (HCC)    Orders:    [facility] Prescribed Meds and Special Instructions    Amphetamines, Methamphetamines    Butalbital    Phenobarbital    Secobarbital    Alprazolam    Clonazepam    Diazepam, Temazepam, Oxazepam    Lorazepam    Gabapentin    Pregabalin    Cocaine    Heroin    Buprenorphine    Levorphanol    Meperidine    Naltrexone    Fentanyl    Methadone    Oxycodone    Tapentadol    THC    Tramadol    Codeine, Hydrocodone, Hydropmorphone, Morphine    Bath Salts    Ethyl Glucuronide/Ethyl Sulfate    Kratom    Spice    Methylphenidate    Phentermine    Validity Oxidant    Validity Creatinine    Validity pH    Validity Specific    Xylazine Definitive Test           History of Present Illness   HPI  Patient is a 60-year-old male presents today for a follow-up on his chronic conditions.  He has cervical radiculopathy, hypertension, dyslipidemia, hypothyroidism, anxiety due to fear of flying, chronic gastritis.  He has been taking his medications regularly.  He denies adverse reactions with his medications.  Review of Systems   Constitutional:  Negative for activity change, chills, fatigue and fever.   HENT:  Negative for congestion, ear pain, sinus pressure and sore throat.    Eyes:  Negative for redness, itching and visual disturbance.   Respiratory:  Negative for cough and shortness of breath.    Cardiovascular:  Negative for chest pain and palpitations.   Gastrointestinal:  Negative for abdominal pain, diarrhea and nausea.   Endocrine: Negative for cold intolerance and heat intolerance.   Genitourinary:  Negative for dysuria, flank pain and frequency.   Musculoskeletal:  Negative for arthralgias, back pain, gait problem and myalgias.   Skin:  Negative for color change.   Allergic/Immunologic: Negative for environmental allergies.   Neurological:  Negative for dizziness, numbness and headaches.   Psychiatric/Behavioral:  Negative for  "behavioral problems and sleep disturbance.        Objective   /76 (BP Location: Left arm, Patient Position: Sitting, Cuff Size: Adult)   Pulse 98   Temp 97.7 °F (36.5 °C)   Ht 5' 10.5\" (1.791 m)   Wt 105 kg (231 lb 3.2 oz)   SpO2 97%   BMI 32.70 kg/m²      Physical Exam  Vitals reviewed.   Constitutional:       General: He is not in acute distress.     Appearance: Normal appearance. He is well-developed.   HENT:      Head: Normocephalic and atraumatic.      Right Ear: Tympanic membrane, ear canal and external ear normal. There is no impacted cerumen.      Left Ear: Tympanic membrane, ear canal and external ear normal. There is no impacted cerumen.      Nose: Nose normal. No congestion or rhinorrhea.      Mouth/Throat:      Mouth: Mucous membranes are moist.      Pharynx: No oropharyngeal exudate or posterior oropharyngeal erythema.     Eyes:      General: No scleral icterus.        Right eye: No discharge.         Left eye: No discharge.      Extraocular Movements: Extraocular movements intact.      Conjunctiva/sclera: Conjunctivae normal.      Pupils: Pupils are equal, round, and reactive to light.     Neck:      Trachea: No tracheal deviation.     Cardiovascular:      Rate and Rhythm: Normal rate and regular rhythm.      Pulses: Normal pulses.           Dorsalis pedis pulses are 2+ on the right side and 2+ on the left side.        Posterior tibial pulses are 2+ on the right side and 2+ on the left side.      Heart sounds: Normal heart sounds. No murmur heard.     No friction rub. No gallop.   Pulmonary:      Effort: Pulmonary effort is normal. No respiratory distress.      Breath sounds: Normal breath sounds. No wheezing, rhonchi or rales.   Abdominal:      General: Bowel sounds are normal. There is no distension.      Palpations: Abdomen is soft.      Tenderness: There is no abdominal tenderness. There is no guarding or rebound.     Musculoskeletal:         General: Normal range of motion.      " Cervical back: Normal range of motion and neck supple.      Right lower leg: No edema.      Left lower leg: No edema.   Lymphadenopathy:      Head:      Right side of head: No submental or submandibular adenopathy.      Left side of head: No submental or submandibular adenopathy.      Cervical: No cervical adenopathy.      Right cervical: No superficial, deep or posterior cervical adenopathy.     Left cervical: No superficial, deep or posterior cervical adenopathy.     Skin:     General: Skin is warm and dry.      Findings: No erythema.     Neurological:      General: No focal deficit present.      Mental Status: He is alert and oriented to person, place, and time.      Cranial Nerves: No cranial nerve deficit.      Sensory: Sensation is intact. No sensory deficit.      Motor: Motor function is intact.     Psychiatric:         Attention and Perception: Attention and perception normal.         Mood and Affect: Mood is not anxious or depressed.         Speech: Speech normal.         Behavior: Behavior normal.         Thought Content: Thought content normal.         Judgment: Judgment normal.

## 2025-07-16 LAB

## 2025-07-30 DIAGNOSIS — M54.12 CERVICAL RADICULOPATHY: ICD-10-CM

## 2025-07-30 RX ORDER — OXYCODONE HYDROCHLORIDE 10 MG/1
10 TABLET ORAL EVERY 8 HOURS PRN
Qty: 60 TABLET | Refills: 0 | Status: SHIPPED | OUTPATIENT
Start: 2025-07-30